# Patient Record
Sex: FEMALE | Race: WHITE | NOT HISPANIC OR LATINO | Employment: UNEMPLOYED | URBAN - NONMETROPOLITAN AREA
[De-identification: names, ages, dates, MRNs, and addresses within clinical notes are randomized per-mention and may not be internally consistent; named-entity substitution may affect disease eponyms.]

---

## 2017-03-07 ENCOUNTER — HOSPITAL ENCOUNTER (OUTPATIENT)
Dept: GENERAL RADIOLOGY | Facility: HOSPITAL | Age: 72
Discharge: HOME | End: 2017-03-07
Attending: FAMILY MEDICINE | Admitting: FAMILY MEDICINE

## 2017-03-07 ENCOUNTER — TRANSCRIBE ORDERS (OUTPATIENT)
Dept: ADMINISTRATIVE | Facility: HOSPITAL | Age: 72
End: 2017-03-07

## 2017-03-07 DIAGNOSIS — M51.26 DISPLACEMENT OF LUMBAR INTERVERTEBRAL DISC WITHOUT MYELOPATHY: Primary | ICD-10-CM

## 2017-03-07 DIAGNOSIS — M51.26 DISPLACEMENT OF LUMBAR INTERVERTEBRAL DISC WITHOUT MYELOPATHY: ICD-10-CM

## 2017-03-07 PROCEDURE — 72110 X-RAY EXAM L-2 SPINE 4/>VWS: CPT | Performed by: RADIOLOGY

## 2017-03-07 PROCEDURE — 72110 X-RAY EXAM L-2 SPINE 4/>VWS: CPT

## 2017-08-14 ENCOUNTER — TRANSCRIBE ORDERS (OUTPATIENT)
Dept: ADMINISTRATIVE | Facility: HOSPITAL | Age: 72
End: 2017-08-14

## 2017-08-14 DIAGNOSIS — Z12.31 VISIT FOR SCREENING MAMMOGRAM: Primary | ICD-10-CM

## 2017-08-15 ENCOUNTER — HOSPITAL ENCOUNTER (OUTPATIENT)
Dept: MAMMOGRAPHY | Facility: HOSPITAL | Age: 72
Discharge: HOME | End: 2017-08-15
Admitting: NURSE PRACTITIONER

## 2017-08-15 DIAGNOSIS — Z12.31 VISIT FOR SCREENING MAMMOGRAM: ICD-10-CM

## 2017-08-15 PROCEDURE — G0202 SCR MAMMO BI INCL CAD: HCPCS

## 2017-08-15 PROCEDURE — 77063 BREAST TOMOSYNTHESIS BI: CPT | Performed by: RADIOLOGY

## 2017-08-15 PROCEDURE — 77063 BREAST TOMOSYNTHESIS BI: CPT

## 2017-08-15 PROCEDURE — G0202 SCR MAMMO BI INCL CAD: HCPCS | Performed by: RADIOLOGY

## 2018-07-18 ENCOUNTER — HOSPITAL ENCOUNTER (OUTPATIENT)
Dept: BONE DENSITY | Facility: HOSPITAL | Age: 73
Discharge: HOME | End: 2018-07-18
Admitting: NURSE PRACTITIONER

## 2018-07-18 DIAGNOSIS — Z78.0 POST-MENOPAUSAL: ICD-10-CM

## 2018-07-18 PROCEDURE — 77080 DXA BONE DENSITY AXIAL: CPT | Performed by: RADIOLOGY

## 2018-07-18 PROCEDURE — 77080 DXA BONE DENSITY AXIAL: CPT

## 2018-12-19 ENCOUNTER — HOSPITAL ENCOUNTER (OUTPATIENT)
Dept: MAMMOGRAPHY | Facility: HOSPITAL | Age: 73
Discharge: HOME | End: 2018-12-19
Admitting: NURSE PRACTITIONER

## 2018-12-19 DIAGNOSIS — Z12.39 SCREENING BREAST EXAMINATION: ICD-10-CM

## 2018-12-19 PROCEDURE — 77067 SCR MAMMO BI INCL CAD: CPT | Performed by: RADIOLOGY

## 2018-12-19 PROCEDURE — 77063 BREAST TOMOSYNTHESIS BI: CPT

## 2018-12-19 PROCEDURE — 77063 BREAST TOMOSYNTHESIS BI: CPT | Performed by: RADIOLOGY

## 2018-12-19 PROCEDURE — 77067 SCR MAMMO BI INCL CAD: CPT

## 2020-01-17 ENCOUNTER — HOSPITAL ENCOUNTER (OUTPATIENT)
Dept: MAMMOGRAPHY | Facility: HOSPITAL | Age: 75
Discharge: HOME | End: 2020-01-17
Admitting: NURSE PRACTITIONER

## 2020-01-17 DIAGNOSIS — Z12.31 VISIT FOR SCREENING MAMMOGRAM: ICD-10-CM

## 2020-01-17 PROCEDURE — 77067 SCR MAMMO BI INCL CAD: CPT

## 2020-01-17 PROCEDURE — 77063 BREAST TOMOSYNTHESIS BI: CPT

## 2020-01-17 PROCEDURE — 77067 SCR MAMMO BI INCL CAD: CPT | Performed by: RADIOLOGY

## 2020-01-17 PROCEDURE — 77063 BREAST TOMOSYNTHESIS BI: CPT | Performed by: RADIOLOGY

## 2020-12-15 ENCOUNTER — TRANSCRIBE ORDERS (OUTPATIENT)
Dept: ADMINISTRATIVE | Facility: HOSPITAL | Age: 75
End: 2020-12-15

## 2020-12-15 DIAGNOSIS — Z01.818 OTHER SPECIFIED PRE-OPERATIVE EXAMINATION: Primary | ICD-10-CM

## 2020-12-18 ENCOUNTER — LAB (OUTPATIENT)
Dept: LAB | Facility: HOSPITAL | Age: 75
End: 2020-12-18

## 2020-12-18 DIAGNOSIS — Z01.818 OTHER SPECIFIED PRE-OPERATIVE EXAMINATION: ICD-10-CM

## 2020-12-18 LAB — SARS-COV-2 RNA RESP QL NAA+PROBE: NOT DETECTED

## 2020-12-18 PROCEDURE — U0004 COV-19 TEST NON-CDC HGH THRU: HCPCS | Performed by: OPHTHALMOLOGY

## 2020-12-18 PROCEDURE — C9803 HOPD COVID-19 SPEC COLLECT: HCPCS

## 2021-01-13 ENCOUNTER — TRANSCRIBE ORDERS (OUTPATIENT)
Dept: ADMINISTRATIVE | Facility: HOSPITAL | Age: 76
End: 2021-01-13

## 2021-01-13 DIAGNOSIS — Z01.818 PRE-OPERATIVE CLEARANCE: Primary | ICD-10-CM

## 2021-01-15 ENCOUNTER — LAB (OUTPATIENT)
Dept: LAB | Facility: HOSPITAL | Age: 76
End: 2021-01-15

## 2021-01-15 DIAGNOSIS — Z01.818 PRE-OPERATIVE CLEARANCE: ICD-10-CM

## 2021-01-15 LAB — SARS-COV-2 RNA RESP QL NAA+PROBE: NOT DETECTED

## 2021-01-15 PROCEDURE — C9803 HOPD COVID-19 SPEC COLLECT: HCPCS

## 2021-01-15 PROCEDURE — U0004 COV-19 TEST NON-CDC HGH THRU: HCPCS | Performed by: OPHTHALMOLOGY

## 2021-02-12 ENCOUNTER — HOSPITAL ENCOUNTER (EMERGENCY)
Facility: HOSPITAL | Age: 76
Discharge: HOME | End: 2021-02-12
Attending: EMERGENCY MEDICINE | Admitting: EMERGENCY MEDICINE

## 2021-02-12 ENCOUNTER — APPOINTMENT (OUTPATIENT)
Dept: GENERAL RADIOLOGY | Facility: HOSPITAL | Age: 76
End: 2021-02-12

## 2021-02-12 VITALS
RESPIRATION RATE: 20 BRPM | HEART RATE: 50 BPM | BODY MASS INDEX: 33.49 KG/M2 | DIASTOLIC BLOOD PRESSURE: 63 MMHG | OXYGEN SATURATION: 97 % | WEIGHT: 182 LBS | HEIGHT: 62 IN | SYSTOLIC BLOOD PRESSURE: 112 MMHG | TEMPERATURE: 98.3 F

## 2021-02-12 DIAGNOSIS — I10 HYPERTENSION, UNSPECIFIED TYPE: Primary | ICD-10-CM

## 2021-02-12 LAB
ALBUMIN SERPL-MCNC: 4.31 G/DL (ref 3.5–5.2)
ALBUMIN/GLOB SERPL: 1.4 G/DL
ALP SERPL-CCNC: 95 U/L (ref 39–117)
ALT SERPL W P-5'-P-CCNC: 18 U/L (ref 1–33)
ANION GAP SERPL CALCULATED.3IONS-SCNC: 14.1 MMOL/L (ref 5–15)
AST SERPL-CCNC: 25 U/L (ref 1–32)
BASOPHILS # BLD AUTO: 0.1 10*3/MM3 (ref 0–0.2)
BASOPHILS NFR BLD AUTO: 1 % (ref 0–1.5)
BILIRUB SERPL-MCNC: 0.3 MG/DL (ref 0–1.2)
BILIRUB UR QL STRIP: NEGATIVE
BUN SERPL-MCNC: 16 MG/DL (ref 8–23)
BUN/CREAT SERPL: 18.8 (ref 7–25)
CALCIUM SPEC-SCNC: 9.5 MG/DL (ref 8.6–10.5)
CHLORIDE SERPL-SCNC: 106 MMOL/L (ref 98–107)
CLARITY UR: CLEAR
CO2 SERPL-SCNC: 18.9 MMOL/L (ref 22–29)
COLOR UR: YELLOW
CREAT SERPL-MCNC: 0.85 MG/DL (ref 0.57–1)
DEPRECATED RDW RBC AUTO: 47.6 FL (ref 37–54)
EOSINOPHIL # BLD AUTO: 0.11 10*3/MM3 (ref 0–0.4)
EOSINOPHIL NFR BLD AUTO: 1.1 % (ref 0.3–6.2)
ERYTHROCYTE [DISTWIDTH] IN BLOOD BY AUTOMATED COUNT: 13.5 % (ref 12.3–15.4)
GFR SERPL CREATININE-BSD FRML MDRD: 66 ML/MIN/1.73
GLOBULIN UR ELPH-MCNC: 3.1 GM/DL
GLUCOSE SERPL-MCNC: 102 MG/DL (ref 65–99)
GLUCOSE UR STRIP-MCNC: NEGATIVE MG/DL
HCT VFR BLD AUTO: 50.3 % (ref 34–46.6)
HGB BLD-MCNC: 15.4 G/DL (ref 12–15.9)
HGB UR QL STRIP.AUTO: NEGATIVE
IMM GRANULOCYTES # BLD AUTO: 0.02 10*3/MM3 (ref 0–0.05)
IMM GRANULOCYTES NFR BLD AUTO: 0.2 % (ref 0–0.5)
KETONES UR QL STRIP: NEGATIVE
LEUKOCYTE ESTERASE UR QL STRIP.AUTO: NEGATIVE
LYMPHOCYTES # BLD AUTO: 3.48 10*3/MM3 (ref 0.7–3.1)
LYMPHOCYTES NFR BLD AUTO: 35.2 % (ref 19.6–45.3)
MCH RBC QN AUTO: 29.1 PG (ref 26.6–33)
MCHC RBC AUTO-ENTMCNC: 30.6 G/DL (ref 31.5–35.7)
MCV RBC AUTO: 95.1 FL (ref 79–97)
MONOCYTES # BLD AUTO: 0.49 10*3/MM3 (ref 0.1–0.9)
MONOCYTES NFR BLD AUTO: 5 % (ref 5–12)
NEUTROPHILS NFR BLD AUTO: 5.68 10*3/MM3 (ref 1.7–7)
NEUTROPHILS NFR BLD AUTO: 57.5 % (ref 42.7–76)
NITRITE UR QL STRIP: NEGATIVE
NRBC BLD AUTO-RTO: 0 /100 WBC (ref 0–0.2)
PH UR STRIP.AUTO: 5.5 [PH] (ref 5–8)
PLATELET # BLD AUTO: 321 10*3/MM3 (ref 140–450)
PMV BLD AUTO: 9.8 FL (ref 6–12)
POTASSIUM SERPL-SCNC: 4.3 MMOL/L (ref 3.5–5.2)
PROT SERPL-MCNC: 7.4 G/DL (ref 6–8.5)
PROT UR QL STRIP: NEGATIVE
RBC # BLD AUTO: 5.29 10*6/MM3 (ref 3.77–5.28)
SODIUM SERPL-SCNC: 139 MMOL/L (ref 136–145)
SP GR UR STRIP: 1.01 (ref 1–1.03)
TROPONIN T SERPL-MCNC: <0.01 NG/ML (ref 0–0.03)
TSH SERPL DL<=0.05 MIU/L-ACNC: 1.41 UIU/ML (ref 0.27–4.2)
UROBILINOGEN UR QL STRIP: NORMAL
WBC # BLD AUTO: 9.88 10*3/MM3 (ref 3.4–10.8)

## 2021-02-12 PROCEDURE — 71045 X-RAY EXAM CHEST 1 VIEW: CPT

## 2021-02-12 PROCEDURE — 80053 COMPREHEN METABOLIC PANEL: CPT | Performed by: NURSE PRACTITIONER

## 2021-02-12 PROCEDURE — 85025 COMPLETE CBC W/AUTO DIFF WBC: CPT | Performed by: NURSE PRACTITIONER

## 2021-02-12 PROCEDURE — 99284 EMERGENCY DEPT VISIT MOD MDM: CPT

## 2021-02-12 PROCEDURE — 84484 ASSAY OF TROPONIN QUANT: CPT | Performed by: NURSE PRACTITIONER

## 2021-02-12 PROCEDURE — 71045 X-RAY EXAM CHEST 1 VIEW: CPT | Performed by: RADIOLOGY

## 2021-02-12 PROCEDURE — 93005 ELECTROCARDIOGRAM TRACING: CPT | Performed by: NURSE PRACTITIONER

## 2021-02-12 PROCEDURE — 81003 URINALYSIS AUTO W/O SCOPE: CPT | Performed by: NURSE PRACTITIONER

## 2021-02-12 PROCEDURE — 84443 ASSAY THYROID STIM HORMONE: CPT | Performed by: NURSE PRACTITIONER

## 2021-02-12 PROCEDURE — 93010 ELECTROCARDIOGRAM REPORT: CPT | Performed by: INTERNAL MEDICINE

## 2021-02-12 RX ORDER — VALSARTAN 80 MG/1
80 TABLET ORAL DAILY
COMMUNITY
End: 2021-08-12

## 2021-02-12 RX ORDER — LOVASTATIN 20 MG/1
20 TABLET ORAL NIGHTLY
COMMUNITY
End: 2021-08-12

## 2021-02-12 RX ORDER — ATENOLOL 25 MG/1
25 TABLET ORAL DAILY
COMMUNITY

## 2021-02-12 RX ORDER — CLONIDINE HYDROCHLORIDE 0.1 MG/1
0.2 TABLET ORAL ONCE
Status: COMPLETED | OUTPATIENT
Start: 2021-02-12 | End: 2021-02-12

## 2021-02-12 RX ORDER — CLONIDINE HYDROCHLORIDE 0.1 MG/1
0.1 TABLET ORAL 2 TIMES DAILY PRN
Qty: 15 TABLET | Refills: 0 | Status: SHIPPED | OUTPATIENT
Start: 2021-02-12 | End: 2021-08-12

## 2021-02-12 RX ORDER — TIZANIDINE 4 MG/1
4 TABLET ORAL NIGHTLY PRN
COMMUNITY
End: 2021-08-12

## 2021-02-12 RX ORDER — LEVOTHYROXINE SODIUM 0.1 MG/1
75 TABLET ORAL DAILY
COMMUNITY

## 2021-02-12 RX ORDER — ESTRADIOL 1 MG/1
1 TABLET ORAL DAILY
COMMUNITY

## 2021-02-12 RX ORDER — CETIRIZINE HYDROCHLORIDE 10 MG/1
10 TABLET ORAL DAILY
COMMUNITY

## 2021-02-12 RX ORDER — ASPIRIN 81 MG/1
81 TABLET, CHEWABLE ORAL DAILY
COMMUNITY

## 2021-02-12 RX ADMIN — CLONIDINE HYDROCHLORIDE 0.2 MG: 0.1 TABLET ORAL at 13:40

## 2021-02-12 NOTE — ED PROVIDER NOTES
Subjective     Hypertension  Severity:  Moderate  Onset quality:  Gradual  Duration:  1 day  Timing:  Intermittent  Progression:  Unchanged  Chronicity:  New  Context: normal sodium, not caffeine, not herbal remedies and not stress    Relieved by:  Nothing  Worsened by:  Nothing  Ineffective treatments: RX meds.  Associated symptoms: headaches    Associated symptoms: no abdominal pain, no confusion and no ear pain    Risk factors comment:  HTN      Review of Systems   Constitutional: Negative.    HENT: Negative for ear pain.    Eyes: Negative.    Respiratory: Negative.    Cardiovascular: Negative.    Gastrointestinal: Negative.  Negative for abdominal pain.   Endocrine: Negative.    Genitourinary: Negative.    Musculoskeletal: Negative.    Skin: Negative.    Allergic/Immunologic: Negative.    Neurological: Positive for headaches.   Hematological: Negative.    Psychiatric/Behavioral: Negative.  Negative for confusion.       Past Medical History:   Diagnosis Date   • Disease of thyroid gland    • Hyperlipidemia    • Hypertension    • Migraine        Allergies   Allergen Reactions   • Ampicillin Diarrhea       Past Surgical History:   Procedure Laterality Date   • HYSTERECTOMY  age 38       Family History   Problem Relation Age of Onset   • Breast cancer Neg Hx        Social History     Socioeconomic History   • Marital status:      Spouse name: Not on file   • Number of children: Not on file   • Years of education: Not on file   • Highest education level: Not on file           Objective   Physical Exam  Vitals signs and nursing note reviewed.   Constitutional:       Appearance: She is well-developed.   HENT:      Head: Normocephalic.      Right Ear: External ear normal.      Left Ear: External ear normal.   Eyes:      Conjunctiva/sclera: Conjunctivae normal.      Pupils: Pupils are equal, round, and reactive to light.   Neck:      Musculoskeletal: Normal range of motion and neck supple.   Cardiovascular:       Rate and Rhythm: Normal rate and regular rhythm.      Heart sounds: Normal heart sounds.   Pulmonary:      Effort: Pulmonary effort is normal.      Breath sounds: Normal breath sounds.   Abdominal:      General: Bowel sounds are normal.      Palpations: Abdomen is soft.   Musculoskeletal: Normal range of motion.   Skin:     General: Skin is warm and dry.      Capillary Refill: Capillary refill takes less than 2 seconds.   Neurological:      Mental Status: She is alert and oriented to person, place, and time.   Psychiatric:         Behavior: Behavior normal.         Thought Content: Thought content normal.         Procedures           ED Course                                           MDM    Final diagnoses:   Hypertension, unspecified type            Tano Viera, APRN  02/16/21 [id number]

## 2021-02-12 NOTE — ED NOTES
Called outpatient lab to come stick pt, dasha said he will come over in a couple min.      Sonny Perdomo  02/12/21 4003

## 2021-02-15 LAB
QT INTERVAL: 456 MS
QTC INTERVAL: 440 MS

## 2021-03-22 ENCOUNTER — TRANSCRIBE ORDERS (OUTPATIENT)
Dept: ADMINISTRATIVE | Facility: HOSPITAL | Age: 76
End: 2021-03-22

## 2021-03-22 ENCOUNTER — HOSPITAL ENCOUNTER (OUTPATIENT)
Dept: GENERAL RADIOLOGY | Facility: HOSPITAL | Age: 76
Discharge: HOME | End: 2021-03-22
Admitting: NURSE PRACTITIONER

## 2021-03-22 DIAGNOSIS — M79.671 RIGHT FOOT PAIN: ICD-10-CM

## 2021-03-22 DIAGNOSIS — M79.671 RIGHT FOOT PAIN: Primary | ICD-10-CM

## 2021-03-22 PROCEDURE — 73630 X-RAY EXAM OF FOOT: CPT

## 2021-03-22 PROCEDURE — 73630 X-RAY EXAM OF FOOT: CPT | Performed by: RADIOLOGY

## 2021-04-02 ENCOUNTER — TRANSCRIBE ORDERS (OUTPATIENT)
Dept: ADMINISTRATIVE | Facility: HOSPITAL | Age: 76
End: 2021-04-02

## 2021-04-02 DIAGNOSIS — H53.452 NASAL STEP VISUAL FIELD DEFECT OF LEFT EYE: Primary | ICD-10-CM

## 2021-04-21 ENCOUNTER — HOSPITAL ENCOUNTER (OUTPATIENT)
Dept: MRI IMAGING | Facility: HOSPITAL | Age: 76
Discharge: HOME | End: 2021-04-21
Admitting: OPHTHALMOLOGY

## 2021-04-21 DIAGNOSIS — H53.452 NASAL STEP VISUAL FIELD DEFECT OF LEFT EYE: ICD-10-CM

## 2021-04-21 PROCEDURE — 70551 MRI BRAIN STEM W/O DYE: CPT | Performed by: RADIOLOGY

## 2021-04-21 PROCEDURE — 70551 MRI BRAIN STEM W/O DYE: CPT

## 2021-08-12 ENCOUNTER — OFFICE VISIT (OUTPATIENT)
Dept: NEUROLOGY | Facility: CLINIC | Age: 76
End: 2021-08-12

## 2021-08-12 VITALS
SYSTOLIC BLOOD PRESSURE: 164 MMHG | OXYGEN SATURATION: 95 % | WEIGHT: 187 LBS | BODY MASS INDEX: 34.41 KG/M2 | HEIGHT: 62 IN | DIASTOLIC BLOOD PRESSURE: 90 MMHG | HEART RATE: 67 BPM

## 2021-08-12 DIAGNOSIS — I63.9 OCCIPITAL STROKE (HCC): Primary | ICD-10-CM

## 2021-08-12 DIAGNOSIS — M19.90 ARTHRITIS: ICD-10-CM

## 2021-08-12 DIAGNOSIS — G44.219 EPISODIC TENSION-TYPE HEADACHE, NOT INTRACTABLE: ICD-10-CM

## 2021-08-12 PROCEDURE — 99204 OFFICE O/P NEW MOD 45 MIN: CPT | Performed by: PSYCHIATRY & NEUROLOGY

## 2021-08-12 RX ORDER — LANOLIN ALCOHOL/MO/W.PET/CERES
1000 CREAM (GRAM) TOPICAL DAILY
COMMUNITY

## 2021-08-12 RX ORDER — GALCANEZUMAB 120 MG/ML
INJECTION, SOLUTION SUBCUTANEOUS
COMMUNITY
Start: 2021-08-10

## 2021-08-12 RX ORDER — AMLODIPINE AND OLMESARTAN MEDOXOMIL 5; 40 MG/1; MG/1
TABLET ORAL
COMMUNITY
Start: 2021-07-03

## 2021-08-12 RX ORDER — IBUPROFEN 800 MG/1
TABLET ORAL
COMMUNITY
Start: 2021-07-26

## 2021-08-12 RX ORDER — OMEPRAZOLE 40 MG/1
CAPSULE, DELAYED RELEASE ORAL
COMMUNITY
Start: 2021-06-10

## 2021-08-12 RX ORDER — CLONIDINE HYDROCHLORIDE 0.2 MG/1
TABLET ORAL
COMMUNITY
Start: 2021-06-29

## 2021-08-12 RX ORDER — LOVASTATIN 40 MG/1
40 TABLET ORAL NIGHTLY
Qty: 30 TABLET | Refills: 6 | Status: SHIPPED | OUTPATIENT
Start: 2021-08-12 | End: 2021-12-23

## 2021-08-12 NOTE — PROGRESS NOTES
Subjective:    CC: Gregoria Pendleton is seen today in consultation at the request of SHAD Hernandez for Migraine       HPI:  76-year-old female with a past medical history of hypertension, hyperlipidemia, hypothyroidism, arthritis presents with headaches and for follow-up of stroke.  As per patient she started having headaches in February of this year along with blurred vision and seeing bright colors in her field of vision.  Prior to that in December and January she had had cataract surgeries.  Then about a month later in March she also started to notice loss of vision worse in the left eye.  In addition she was having visual hallucinations at the time where she saw people or snakes in her field of vision.  This lasted for a few weeks.  She was started on once a month Emgality shots which helped with the headaches.  She currently has a headache about once a week on the top of her head on both sides with nausea and photophobia.  She typically takes Tylenol for the headaches.  Also takes ibuprofen twice a day for arthritis.  She had a MRI brain in April that showed a chronic infarct in the right occipital region along with extensive chronic ischemic changes no acute abnormalities.  Patient has been on aspirin 81 mg and lovastatin 20 mg for several years.  Her most recent lipid panel was as follows-, , , HDL 66.  A1c was 5.9.  Of note-I personally reviewed her MRI brain    The following portions of the patient's history were reviewed today and updated as of 08/12/2021  : allergies, current medications, past family history, past medical history, past social history, past surgical history and problem list  These document will be scanned to patient's chart.      Current Outpatient Medications:   •  amlodipine-olmesartan (ARVIN) 5-40 MG per tablet, , Disp: , Rfl:   •  aspirin 81 MG chewable tablet, Chew 81 mg Daily., Disp: , Rfl:   •  atenolol (TENORMIN) 25 MG tablet, Take 25 mg by mouth Daily.,  "Disp: , Rfl:   •  cetirizine (zyrTEC) 10 MG tablet, Take 10 mg by mouth Daily., Disp: , Rfl:   •  cloNIDine (CATAPRES) 0.2 MG tablet, , Disp: , Rfl:   •  Emgality 120 MG/ML auto-injector pen, , Disp: , Rfl:   •  estradiol (ESTRACE) 1 MG tablet, Take 1 mg by mouth Daily., Disp: , Rfl:   •  ibuprofen (ADVIL,MOTRIN) 800 MG tablet, , Disp: , Rfl:   •  levothyroxine (SYNTHROID, LEVOTHROID) 100 MCG tablet, Take 75 mcg by mouth Daily., Disp: , Rfl:   •  omeprazole (priLOSEC) 40 MG capsule, , Disp: , Rfl:   •  vitamin B-12 (CYANOCOBALAMIN) 1000 MCG tablet, Take 1,000 mcg by mouth Daily., Disp: , Rfl:   •  vitamin D3 125 MCG (5000 UT) capsule capsule, Take 5,000 Units by mouth Daily., Disp: , Rfl:   •  lovastatin (MEVACOR) 40 MG tablet, Take 1 tablet by mouth Every Night., Disp: 30 tablet, Rfl: 6  •  ubrogepant (ubrogepant) 50 MG tablet, Take 1 tablet by mouth As Needed (At the onset of her headache.  May repeat once in 2 hours.  Do not take over 2 tabs a day or 8 tabs a month)., Disp: 8 tablet, Rfl: 3   Past Medical History:   Diagnosis Date   • Disease of thyroid gland    • Hyperlipidemia    • Hypertension    • Migraine       Past Surgical History:   Procedure Laterality Date   • HYSTERECTOMY  age 38      Family History   Problem Relation Age of Onset   • Breast cancer Neg Hx       Social History     Socioeconomic History   • Marital status:      Spouse name: Not on file   • Number of children: Not on file   • Years of education: Not on file   • Highest education level: Not on file   Tobacco Use   • Smoking status: Never Smoker   • Smokeless tobacco: Never Used   Substance and Sexual Activity   • Alcohol use: Never   • Drug use: Never     Review of Systems   Eyes: Positive for visual disturbance.   Neurological: Positive for headache.   All other systems reviewed and are negative.      Objective:    /90   Pulse 67   Ht 157.5 cm (62\")   Wt 84.8 kg (187 lb)   SpO2 95%   BMI 34.20 kg/m²     Neurology " Exam:    General apperance: NAD.     Mental status: Alert, awake and oriented to time place and person.    Recent and Remote memory: Intact.    Attention span and Concentration: Normal.     Language and Speech: Intact- No dysarthria.    Fluency, Naming , Repitition and Comprehension:  Intact    Cranial Nerves:   CN II: Visual fields reveal left homonymous inferior quadrantanopsia. Intact. Fundi - Normal, No papillederma, Pupils - CHELSI  CN III, IV and VI: Extraocular movements are intact. Normal saccades.   CN V: Facial sensation is intact.   CN VII: Muscles of facial expression reveal no asymmetry. Intact.   CN VIII: Hearing is intact. Whispered voice intact.   CN IX and X: Palate elevates symmetrically. Intact  CN XI: Shoulder shrug is intact.   CN XII: Tongue is midline without evidence of atrophy or fasciculation.     Ophthalmoscopic exam of optic disc-normal    Motor:  Right UE muscle strength 5/5. Normal tone.     Left UE muscle strength 5/5. Normal tone.      Right LE muscle strength5/5. Normal tone.     Left LE muscle strength 5/5. Normal tone.      Sensory: Normal light touch, vibration and pinprick sensation bilaterally.    DTRs: 2+ bilaterally in upper and lower extremities.    Babinski: Negative bilaterally.    Co-ordination: Normal finger-to-nose, heel to shin B/L.    Rhomberg: Negative.    Gait: Normal.    Cardiovascular: Regular rate and rhythm without murmur, gallop or rub.    Assessment and Plan:  1. Occipital stroke (CMS/HCC)  Right occipital infarct  I will check a Holter monitor to rule out atrial fibrillation  I have told her to increase her aspirin to 325 mg daily  She should also increase her lovastatin to 40 mg daily for goal LDL of less than 100.  Her LDL was 107  She is a prediabetic, A1c of 5.9 and I have told her to make dietary modifications  Goal blood pressure less than 130/90.  Her blood pressure is elevated today therefore I have told her to monitor her blood pressure regularly at  home and increase her dose of clonidine if need be.  She was supposed to be taking it 3 times a day but only takes it twice a day.  I also explained to her that her visual hallucinations could be due to her vision loss.  Fortunately they have subsided    - Holter Monitor - 72 Hour Up To 15 Days; Future    2. Episodic tension-type headache, not intractable  Patient's headaches were most likely a result of her occipital stroke  She is currently on  Emgality 120 mg sc q. Monthly  I will start her on Ubrelvy 50 mg for abortive treatment of her headaches  I have told her to avoid over-the-counter medications    3. Arthritis  Patient is taking daily ibuprofen twice a day.  I have told her to reduce her intake of ibuprofen and speak to her PCP about starting her on another disease modifying agent  She can also start chondroitin/glucosamine supplements       Return in about 3 months (around 11/12/2021).     I spent over 45 minutes with the patient face to face out of which over 50% (30 minutes) was spent in management, instructions and education.     Glo Mcmahan MD

## 2021-12-23 RX ORDER — LOVASTATIN 40 MG/1
TABLET ORAL
Qty: 90 TABLET | Refills: 3 | Status: SHIPPED | OUTPATIENT
Start: 2021-12-23

## 2022-06-06 ENCOUNTER — TRANSCRIBE ORDERS (OUTPATIENT)
Dept: ADMINISTRATIVE | Facility: HOSPITAL | Age: 77
End: 2022-06-06

## 2022-06-06 DIAGNOSIS — N28.9 ABNORMAL RENAL FUNCTION: Primary | ICD-10-CM

## 2022-08-04 ENCOUNTER — HOSPITAL ENCOUNTER (OUTPATIENT)
Dept: ULTRASOUND IMAGING | Facility: HOSPITAL | Age: 77
Discharge: HOME | End: 2022-08-04
Admitting: NURSE PRACTITIONER

## 2022-08-04 DIAGNOSIS — N28.9 ABNORMAL RENAL FUNCTION: ICD-10-CM

## 2022-08-04 PROCEDURE — 76775 US EXAM ABDO BACK WALL LIM: CPT | Performed by: RADIOLOGY

## 2022-08-04 PROCEDURE — 76775 US EXAM ABDO BACK WALL LIM: CPT

## 2023-08-07 ENCOUNTER — TRANSCRIBE ORDERS (OUTPATIENT)
Dept: ADMINISTRATIVE | Facility: HOSPITAL | Age: 78
End: 2023-08-07
Payer: MEDICARE

## 2023-08-07 ENCOUNTER — HOSPITAL ENCOUNTER (OUTPATIENT)
Dept: GENERAL RADIOLOGY | Facility: HOSPITAL | Age: 78
Discharge: HOME | End: 2023-08-07
Admitting: NURSE PRACTITIONER
Payer: MEDICARE

## 2023-08-07 DIAGNOSIS — R06.02 SHORTNESS OF BREATH: ICD-10-CM

## 2023-08-07 DIAGNOSIS — M79.671 RIGHT FOOT PAIN: ICD-10-CM

## 2023-08-07 DIAGNOSIS — R06.02 SHORTNESS OF BREATH: Primary | ICD-10-CM

## 2023-08-07 PROCEDURE — 73630 X-RAY EXAM OF FOOT: CPT | Performed by: RADIOLOGY

## 2023-08-07 PROCEDURE — 71046 X-RAY EXAM CHEST 2 VIEWS: CPT

## 2023-08-07 PROCEDURE — 71046 X-RAY EXAM CHEST 2 VIEWS: CPT | Performed by: RADIOLOGY

## 2023-08-07 PROCEDURE — 73630 X-RAY EXAM OF FOOT: CPT

## 2023-08-29 ENCOUNTER — OFFICE VISIT (OUTPATIENT)
Dept: CARDIOLOGY | Facility: CLINIC | Age: 78
End: 2023-08-29
Payer: MEDICARE

## 2023-08-29 VITALS
SYSTOLIC BLOOD PRESSURE: 154 MMHG | DIASTOLIC BLOOD PRESSURE: 76 MMHG | BODY MASS INDEX: 31.83 KG/M2 | WEIGHT: 173 LBS | HEIGHT: 62 IN | OXYGEN SATURATION: 95 % | HEART RATE: 68 BPM | RESPIRATION RATE: 16 BRPM

## 2023-08-29 DIAGNOSIS — J84.9 INTERSTITIAL LUNG DISEASE: ICD-10-CM

## 2023-08-29 DIAGNOSIS — I10 ESSENTIAL HYPERTENSION: ICD-10-CM

## 2023-08-29 DIAGNOSIS — R06.09 DYSPNEA ON EXERTION: Primary | ICD-10-CM

## 2023-08-29 PROCEDURE — 99204 OFFICE O/P NEW MOD 45 MIN: CPT | Performed by: INTERNAL MEDICINE

## 2023-08-29 PROCEDURE — 1159F MED LIST DOCD IN RCRD: CPT | Performed by: INTERNAL MEDICINE

## 2023-08-29 PROCEDURE — 1160F RVW MEDS BY RX/DR IN RCRD: CPT | Performed by: INTERNAL MEDICINE

## 2023-08-29 PROCEDURE — 93000 ELECTROCARDIOGRAM COMPLETE: CPT | Performed by: INTERNAL MEDICINE

## 2023-08-29 RX ORDER — DULOXETIN HYDROCHLORIDE 60 MG/1
60 CAPSULE, DELAYED RELEASE ORAL DAILY
COMMUNITY

## 2023-08-29 RX ORDER — DULAGLUTIDE 3 MG/.5ML
3 INJECTION, SOLUTION SUBCUTANEOUS
COMMUNITY

## 2023-08-29 RX ORDER — BUMETANIDE 1 MG/1
1 TABLET ORAL DAILY
Qty: 30 TABLET | Refills: 2 | Status: SHIPPED | OUTPATIENT
Start: 2023-08-29

## 2023-08-29 RX ORDER — TIZANIDINE 4 MG/1
4 TABLET ORAL NIGHTLY PRN
COMMUNITY

## 2023-08-29 RX ORDER — SPIRONOLACTONE 50 MG/1
50 TABLET, FILM COATED ORAL 2 TIMES DAILY
COMMUNITY

## 2023-08-29 NOTE — PROGRESS NOTES
Marisa Arias APRN  Gregoria Pendleton  1945 08/29/2023    There is no problem list on file for this patient.      Dear Marisa Arias APRN:    Subjective     Gregoria Pendleton is a 78 y.o. female with the problems as listed above, presents    Chief complaint: Progressive shortness of breath since 2019    History of Present Illness: Ms. Lino is a pleasant 78-year-old  female with no history of known coronary artery disease or structural heart disease, has history of hypertension, type 2 diabetes mellitus, presents with complaints of having progressive shortness of breath since 2019.  She believes that she probably had COVID even before it was recognized as epidemic back then.  She never had a positive COVID test since then.  She had flulike symptoms but she apparently did not get any evaluation at that time.  She denies any complaints of chest pains other than some gas pains that seem to be relieved with liquid antacid.  She denies any PND, orthopnea or pedal edema.  She has remote history of SVT back in 1980s with apparently no recurrence.  She is a non-smoker.  She denies any exposure to chemicals or dust.  Her chest x-ray on 8/7/2023 revealed diffuse interstitial lung disease which apparently is new since chest x-ray in 2021.    XR Chest PA & Lateral [[id number]]   Order  Status: Final result     Study Notes     Adelina Steve on 8/7/2023 12:37 PM EDT   Cough/pain rt foot     Study Result    Narrative & Impression   EXAMINATION: XR CHEST PA AND LATERAL-      CLINICAL INDICATION: R06.2; R06.02-Shortness of breath        COMPARISON: 02/12/2021      TECHNIQUE: XR CHEST PA AND LATERAL-      FINDINGS:   LUNGS: Diffuse interstitial lung disease      HEART AND MEDIASTINUM: Heart and mediastinal contours are unremarkable     SKELETON: Bony and soft tissue structures are unremarkable.     IMPRESSION:     Diffuse interstitial lung disease.     This report was finalized on 8/7/2023 12:47 PM by   Shawn Cohen MD.         Allergies   Allergen Reactions    Ampicillin Diarrhea   :    Current Outpatient Medications:     amlodipine-olmesartan (ARVIN) 5-40 MG per tablet, , Disp: , Rfl:     aspirin 81 MG chewable tablet, Chew 1 tablet Daily., Disp: , Rfl:     atenolol (TENORMIN) 25 MG tablet, Take 1 tablet by mouth Daily., Disp: , Rfl:     cetirizine (zyrTEC) 10 MG tablet, Take 1 tablet by mouth Daily., Disp: , Rfl:     cloNIDine (CATAPRES) 0.2 MG tablet, 1 tablet 3 (Three) Times a Day., Disp: , Rfl:     Emgality 120 MG/ML auto-injector pen, , Disp: , Rfl:     estradiol (ESTRACE) 1 MG tablet, Take 1 tablet by mouth Daily., Disp: , Rfl:     levothyroxine (SYNTHROID, LEVOTHROID) 100 MCG tablet, Take 75 mcg by mouth Daily., Disp: , Rfl:     lovastatin (MEVACOR) 40 MG tablet, TAKE 1 TABLET EVERY NIGHT, Disp: 90 tablet, Rfl: 3    omeprazole (priLOSEC) 40 MG capsule, , Disp: , Rfl:     ubrogepant (ubrogepant) 50 MG tablet, Take 1 tablet by mouth As Needed (At the onset of her headache.  May repeat once in 2 hours.  Do not take over 2 tabs a day or 8 tabs a month)., Disp: 8 tablet, Rfl: 3    bumetanide (BUMEX) 1 MG tablet, Take 1 tablet by mouth Daily., Disp: 30 tablet, Rfl: 2    Dulaglutide (Trulicity) 3 MG/0.5ML solution pen-injector, Inject 0.5 mL under the skin into the appropriate area as directed., Disp: , Rfl:     DULoxetine (CYMBALTA) 60 MG capsule, Take 1 capsule by mouth Daily., Disp: , Rfl:     linaclotide (LINZESS) 290 MCG capsule capsule, Take 1 capsule by mouth Every Morning Before Breakfast., Disp: , Rfl:     spironolactone (ALDACTONE) 50 MG tablet, Take 1 tablet by mouth 2 (Two) Times a Day., Disp: , Rfl:     tiZANidine (ZANAFLEX) 4 MG tablet, Take 1 tablet by mouth At Night As Needed for Muscle Spasms., Disp: , Rfl:     Past Medical History:   Diagnosis Date    Disease of thyroid gland     Hyperlipidemia     Hypertension     Migraine     Stroke      Past Surgical History:   Procedure Laterality Date     "HYSTERECTOMY  age 38     Family History   Problem Relation Age of Onset    Heart failure Father     Heart disease Sister     Heart attack Sister     Breast cancer Neg Hx      Social History     Tobacco Use    Smoking status: Never    Smokeless tobacco: Never   Substance Use Topics    Alcohol use: Never    Drug use: Never     Review of Systems   Constitutional: Negative for chills, fever, malaise/fatigue, weight gain and weight loss.   HENT:  Negative for congestion and nosebleeds.    Eyes:  Positive for visual disturbance.   Cardiovascular:  Negative for chest pain, irregular heartbeat, leg swelling and orthopnea.   Respiratory:  Positive for shortness of breath. Negative for cough and hemoptysis.    Skin: Negative.    Gastrointestinal:  Negative for abdominal pain, change in bowel habit, hematemesis, melena and vomiting.   Genitourinary:  Negative for dysuria, frequency and hematuria.   Neurological:  Negative for focal weakness, headaches, light-headedness and weakness.   Psychiatric/Behavioral: Negative.     Objective   Blood pressure 154/76, pulse 68, resp. rate 16, height 157.5 cm (62\"), weight 78.5 kg (173 lb), SpO2 95 %.  Body mass index is 31.64 kg/mý.    Vitals reviewed.   Constitutional:       Appearance: Well-developed.   Eyes:      Conjunctiva/sclera: Conjunctivae normal.   HENT:      Head: Normocephalic.   Neck:      Thyroid: No thyromegaly.      Vascular: No JVD.      Trachea: No tracheal deviation.   Pulmonary:      Effort: No respiratory distress.      Breath sounds: Normal breath sounds. No wheezing. No rales.   Cardiovascular:      PMI at left midclavicular line. Normal rate. Regular rhythm. Normal S1. Normal S2.       Murmurs: There is no murmur.      No gallop.  No click. No rub.   Pulses:     Intact distal pulses.   Edema:     Pretibial: bilateral 1+ edema of the pretibial area.     Ankle: bilateral 1+ edema of the ankle.     Feet: bilateral 1+ edema of the feet.  Abdominal:      General: Bowel " sounds are normal.      Palpations: Abdomen is soft. There is no abdominal mass.      Tenderness: There is no abdominal tenderness.   Musculoskeletal:      Cervical back: Normal range of motion and neck supple. Skin:     General: Skin is warm and dry.   Neurological:      Mental Status: Alert and oriented to person, place, and time.      Cranial Nerves: No cranial nerve deficit.       Lab Results   Component Value Date     02/12/2021    K 4.3 02/12/2021     02/12/2021    CO2 18.9 (L) 02/12/2021    BUN 16 02/12/2021    CREATININE 0.85 02/12/2021    GLUCOSE 102 (H) 02/12/2021    CALCIUM 9.5 02/12/2021    AST 25 02/12/2021    ALT 18 02/12/2021    ALKPHOS 95 02/12/2021     No results found for: CKTOTAL  Lab Results   Component Value Date    WBC 9.88 02/12/2021    HGB 15.4 02/12/2021    HCT 50.3 (H) 02/12/2021     02/12/2021       Lab Results   Component Value Date    TSH 1.410 02/12/2021      No results found for: BNP    ECG 12 Lead    Date/Time: 8/29/2023 12:53 PM  Performed by: Damian Novak MD  Authorized by: Damian Novak MD   Comparison: compared with previous ECG from 2/12/2021  Similar to previous ECG  Rhythm: sinus rhythm  BPM: 66  Other findings: low voltage            Assessment & Plan    Diagnosis Plan   1. Dyspnea on exertion (NYHA class III)  CT Chest Without Contrast, BNP    Adult Transthoracic Echo Complete       2. Interstitial lung disease        3. Essential hypertension            Recommendations  Orders Placed This Encounter   Procedures    CT Chest Without Contrast    BNP    ECG 12 Lead    Adult Transthoracic Echo Complete w/ Color, Spectral and Contrast if necessary per protocol      We will evaluate her dyspnea further with a proBNP, CT scan of the chest without contrast and echo Doppler study.  We will add bumetanide 1 mg daily for 5 days and then stop.  Check CMP.  We will refer her to a pulmonologist to address her interstitial lung disease.    Return in about 4 weeks  (around 9/26/2023).    As always, Marisa Arias APRN  I appreciate very much the opportunity to participate in the cardiovascular care of your patients. Please do not hesitate to call me with any questions with regards to Gregoriamatthew Pendleton's evaluation and management.     With Best Regards,        Damian Novak MD, [facility]    Please note that portions of this note were completed with a voice recognition program.

## 2023-08-29 NOTE — LETTER
August 31, 2023     SHAD Hernandez  [address]  [place] [address]    Patient: Gregoria Pendleton   YOB: 1945   Date of Visit: 8/29/2023     Dear SHAD Hernandez:       Thank you for referring Gregoria Pendleton to me for evaluation. Below are the relevant portions of my assessment and plan of care.    If you have questions, please do not hesitate to call me. I look forward to following Gregoria along with you.         Sincerely,        Damian Novak MD        CC: No Recipients    Damian Novak MD  08/31/23 1037  Sign when Signing Visit  Marisa Arias APRN  Gregoria Pendleton  1945 08/29/2023    There is no problem list on file for this patient.      Dear Marisa Arias APRN:    Subjective    Gregoria Pendleton is a 78 y.o. female with the problems as listed above, presents    Chief complaint: Progressive shortness of breath since 2019    History of Present Illness: Ms. Lino is a pleasant 78-year-old  female with no history of known coronary artery disease or structural heart disease, has history of hypertension, type 2 diabetes mellitus, presents with complaints of having progressive shortness of breath since 2019.  She believes that she probably had COVID even before it was recognized as epidemic back then.  She never had a positive COVID test since then.  She had flulike symptoms but she apparently did not get any evaluation at that time.  She denies any complaints of chest pains other than some gas pains that seem to be relieved with liquid antacid.  She denies any PND, orthopnea or pedal edema.  She has remote history of SVT back in 1980s with apparently no recurrence.  She is a non-smoker.  She denies any exposure to chemicals or dust.  Her chest x-ray on 8/7/2023 revealed diffuse interstitial lung disease which apparently is new since chest x-ray in 2021.    XR Chest PA & Lateral [[id number]]   Order  Status: Final result     Study Notes     Power  Adelina BLAIR on 8/7/2023 12:37 PM EDT   Cough/pain rt foot     Study Result    Narrative & Impression   EXAMINATION: XR CHEST PA AND LATERAL-      CLINICAL INDICATION: R06.2; R06.02-Shortness of breath        COMPARISON: 02/12/2021      TECHNIQUE: XR CHEST PA AND LATERAL-      FINDINGS:   LUNGS: Diffuse interstitial lung disease      HEART AND MEDIASTINUM: Heart and mediastinal contours are unremarkable     SKELETON: Bony and soft tissue structures are unremarkable.     IMPRESSION:     Diffuse interstitial lung disease.     This report was finalized on 8/7/2023 12:47 PM by Dr. Shawn Cohen MD.         Allergies   Allergen Reactions    Ampicillin Diarrhea   :    Current Outpatient Medications:     amlodipine-olmesartan (ARVIN) 5-40 MG per tablet, , Disp: , Rfl:     aspirin 81 MG chewable tablet, Chew 1 tablet Daily., Disp: , Rfl:     atenolol (TENORMIN) 25 MG tablet, Take 1 tablet by mouth Daily., Disp: , Rfl:     cetirizine (zyrTEC) 10 MG tablet, Take 1 tablet by mouth Daily., Disp: , Rfl:     cloNIDine (CATAPRES) 0.2 MG tablet, 1 tablet 3 (Three) Times a Day., Disp: , Rfl:     Emgality 120 MG/ML auto-injector pen, , Disp: , Rfl:     estradiol (ESTRACE) 1 MG tablet, Take 1 tablet by mouth Daily., Disp: , Rfl:     levothyroxine (SYNTHROID, LEVOTHROID) 100 MCG tablet, Take 75 mcg by mouth Daily., Disp: , Rfl:     lovastatin (MEVACOR) 40 MG tablet, TAKE 1 TABLET EVERY NIGHT, Disp: 90 tablet, Rfl: 3    omeprazole (priLOSEC) 40 MG capsule, , Disp: , Rfl:     ubrogepant (ubrogepant) 50 MG tablet, Take 1 tablet by mouth As Needed (At the onset of her headache.  May repeat once in 2 hours.  Do not take over 2 tabs a day or 8 tabs a month)., Disp: 8 tablet, Rfl: 3    bumetanide (BUMEX) 1 MG tablet, Take 1 tablet by mouth Daily., Disp: 30 tablet, Rfl: 2    Dulaglutide (Trulicity) 3 MG/0.5ML solution pen-injector, Inject 0.5 mL under the skin into the appropriate area as directed., Disp: , Rfl:     DULoxetine  "(CYMBALTA) 60 MG capsule, Take 1 capsule by mouth Daily., Disp: , Rfl:     linaclotide (LINZESS) 290 MCG capsule capsule, Take 1 capsule by mouth Every Morning Before Breakfast., Disp: , Rfl:     spironolactone (ALDACTONE) 50 MG tablet, Take 1 tablet by mouth 2 (Two) Times a Day., Disp: , Rfl:     tiZANidine (ZANAFLEX) 4 MG tablet, Take 1 tablet by mouth At Night As Needed for Muscle Spasms., Disp: , Rfl:     Past Medical History:   Diagnosis Date    Disease of thyroid gland     Hyperlipidemia     Hypertension     Migraine     Stroke      Past Surgical History:   Procedure Laterality Date    HYSTERECTOMY  age 38     Family History   Problem Relation Age of Onset    Heart failure Father     Heart disease Sister     Heart attack Sister     Breast cancer Neg Hx      Social History     Tobacco Use    Smoking status: Never    Smokeless tobacco: Never   Substance Use Topics    Alcohol use: Never    Drug use: Never     Review of Systems   Constitutional: Negative for chills, fever, malaise/fatigue, weight gain and weight loss.   HENT:  Negative for congestion and nosebleeds.    Eyes:  Positive for visual disturbance.   Cardiovascular:  Negative for chest pain, irregular heartbeat, leg swelling and orthopnea.   Respiratory:  Positive for shortness of breath. Negative for cough and hemoptysis.    Skin: Negative.    Gastrointestinal:  Negative for abdominal pain, change in bowel habit, hematemesis, melena and vomiting.   Genitourinary:  Negative for dysuria, frequency and hematuria.   Neurological:  Negative for focal weakness, headaches, light-headedness and weakness.   Psychiatric/Behavioral: Negative.     Objective  Blood pressure 154/76, pulse 68, resp. rate 16, height 157.5 cm (62\"), weight 78.5 kg (173 lb), SpO2 95 %.  Body mass index is 31.64 kg/mý.    Vitals reviewed.   Constitutional:       Appearance: Well-developed.   Eyes:      Conjunctiva/sclera: Conjunctivae normal.   HENT:      Head: " Normocephalic.   Neck:      Thyroid: No thyromegaly.      Vascular: No JVD.      Trachea: No tracheal deviation.   Pulmonary:      Effort: No respiratory distress.      Breath sounds: Normal breath sounds. No wheezing. No rales.   Cardiovascular:      PMI at left midclavicular line. Normal rate. Regular rhythm. Normal S1. Normal S2.       Murmurs: There is no murmur.      No gallop.  No click. No rub.   Pulses:     Intact distal pulses.   Edema:     Pretibial: bilateral 1+ edema of the pretibial area.     Ankle: bilateral 1+ edema of the ankle.     Feet: bilateral 1+ edema of the feet.  Abdominal:      General: Bowel sounds are normal.      Palpations: Abdomen is soft. There is no abdominal mass.      Tenderness: There is no abdominal tenderness.   Musculoskeletal:      Cervical back: Normal range of motion and neck supple. Skin:     General: Skin is warm and dry.   Neurological:      Mental Status: Alert and oriented to person, place, and time.      Cranial Nerves: No cranial nerve deficit.       Lab Results   Component Value Date     02/12/2021    K 4.3 02/12/2021     02/12/2021    CO2 18.9 (L) 02/12/2021    BUN 16 02/12/2021    CREATININE 0.85 02/12/2021    GLUCOSE 102 (H) 02/12/2021    CALCIUM 9.5 02/12/2021    AST 25 02/12/2021    ALT 18 02/12/2021    ALKPHOS 95 02/12/2021     No results found for: CKTOTAL  Lab Results   Component Value Date    WBC 9.88 02/12/2021    HGB 15.4 02/12/2021    HCT 50.3 (H) 02/12/2021     02/12/2021       Lab Results   Component Value Date    TSH 1.410 02/12/2021      No results found for: BNP    ECG 12 Lead    Date/Time: 8/29/2023 12:53 PM  Performed by: Damian Novak MD  Authorized by: Damian Novak MD   Comparison: compared with previous ECG from 2/12/2021  Similar to previous ECG  Rhythm: sinus rhythm  BPM: 66  Other findings: low voltage            Assessment & Plan   Diagnosis Plan   1. Dyspnea on exertion (NYHA class III)  CT Chest Without Contrast,  BNP    Adult Transthoracic Echo Complete       2. Interstitial lung disease        3. Essential hypertension            Recommendations  Orders Placed This Encounter   Procedures    CT Chest Without Contrast    BNP    ECG 12 Lead    Adult Transthoracic Echo Complete w/ Color, Spectral and Contrast if necessary per protocol      We will evaluate her dyspnea further with a proBNP, CT scan of the chest without contrast and echo Doppler study.  We will add bumetanide 1 mg daily for 5 days and then stop.  Check CMP.  We will refer her to a pulmonologist to address her interstitial lung disease.    Return in about 4 weeks (around 9/26/2023).    As always, Marisa Arias APRN  I appreciate very much the opportunity to participate in the cardiovascular care of your patients. Please do not hesitate to call me with any questions with regards to Gregoria Pendleton's evaluation and management.     With Best Regards,        Damian Novak MD, [facility]    Please note that portions of this note were completed with a voice recognition program.

## 2023-09-18 ENCOUNTER — TELEPHONE (OUTPATIENT)
Dept: CARDIOLOGY | Facility: CLINIC | Age: 78
End: 2023-09-18
Payer: MEDICARE

## 2023-09-29 ENCOUNTER — HOSPITAL ENCOUNTER (OUTPATIENT)
Dept: CARDIOLOGY | Facility: HOSPITAL | Age: 78
Discharge: HOME | End: 2023-09-29
Payer: MEDICARE

## 2023-09-29 ENCOUNTER — HOSPITAL ENCOUNTER (OUTPATIENT)
Dept: CT IMAGING | Facility: HOSPITAL | Age: 78
Discharge: HOME | End: 2023-09-29
Payer: MEDICARE

## 2023-09-29 DIAGNOSIS — R06.09 DYSPNEA ON EXERTION: ICD-10-CM

## 2023-09-29 PROCEDURE — 93306 TTE W/DOPPLER COMPLETE: CPT

## 2023-09-29 PROCEDURE — 71250 CT THORAX DX C-: CPT

## 2023-10-01 LAB
BH CV ECHO MEAS - AO MAX PG: 5.9 MMHG
BH CV ECHO MEAS - AO MEAN PG: 4 MMHG
BH CV ECHO MEAS - AO ROOT DIAM: 2.5 CM
BH CV ECHO MEAS - AO V2 MAX: 121 CM/SEC
BH CV ECHO MEAS - AO V2 VTI: 30.5 CM
BH CV ECHO MEAS - EDV(CUBED): 35.9 ML
BH CV ECHO MEAS - EDV(MOD-SP4): 22.2 ML
BH CV ECHO MEAS - EF(MOD-SP4): 67.5 %
BH CV ECHO MEAS - ESV(CUBED): 12.2 ML
BH CV ECHO MEAS - ESV(MOD-SP4): 7.2 ML
BH CV ECHO MEAS - FS: 30.3 %
BH CV ECHO MEAS - IVS/LVPW: 1 CM
BH CV ECHO MEAS - IVSD: 1.4 CM
BH CV ECHO MEAS - LA DIMENSION: 2.7 CM
BH CV ECHO MEAS - LAT PEAK E' VEL: 8.9 CM/SEC
BH CV ECHO MEAS - LV MASS(C)D: 159.5 GRAMS
BH CV ECHO MEAS - LVIDD: 3.3 CM
BH CV ECHO MEAS - LVIDS: 2.3 CM
BH CV ECHO MEAS - LVOT AREA: 2.01 CM2
BH CV ECHO MEAS - LVOT DIAM: 1.6 CM
BH CV ECHO MEAS - LVPWD: 1.4 CM
BH CV ECHO MEAS - MED PEAK E' VEL: 5.6 CM/SEC
BH CV ECHO MEAS - MV A MAX VEL: 126 CM/SEC
BH CV ECHO MEAS - MV E MAX VEL: 127 CM/SEC
BH CV ECHO MEAS - MV E/A: 1.01
BH CV ECHO MEAS - PA ACC TIME: 0.16 SEC
BH CV ECHO MEAS - RAP SYSTOLE: 10 MMHG
BH CV ECHO MEAS - RVSP: 32.9 MMHG
BH CV ECHO MEAS - SV(MOD-SP4): 15 ML
BH CV ECHO MEAS - TAPSE (>1.6): 1.98 CM
BH CV ECHO MEAS - TR MAX PG: 22.9 MMHG
BH CV ECHO MEAS - TR MAX VEL: 238.7 CM/SEC
BH CV ECHO MEASUREMENTS AVERAGE E/E' RATIO: 17.52

## 2023-10-05 DIAGNOSIS — J84.9 INTERSTITIAL LUNG DISEASE: Primary | ICD-10-CM

## 2023-10-12 ENCOUNTER — TELEPHONE (OUTPATIENT)
Dept: CARDIOLOGY | Facility: CLINIC | Age: 78
End: 2023-10-12
Payer: MEDICARE

## 2023-10-12 NOTE — TELEPHONE ENCOUNTER
Called patient to advise to get her BNP drawn prior to Tuesday's appt.  She verbalized understanding.

## 2023-10-17 ENCOUNTER — OFFICE VISIT (OUTPATIENT)
Dept: CARDIOLOGY | Facility: CLINIC | Age: 78
End: 2023-10-17
Payer: MEDICARE

## 2023-10-17 ENCOUNTER — TELEPHONE (OUTPATIENT)
Dept: CARDIOLOGY | Facility: CLINIC | Age: 78
End: 2023-10-17
Payer: MEDICARE

## 2023-10-17 VITALS
DIASTOLIC BLOOD PRESSURE: 76 MMHG | WEIGHT: 174.4 LBS | BODY MASS INDEX: 32.09 KG/M2 | HEART RATE: 73 BPM | OXYGEN SATURATION: 96 % | SYSTOLIC BLOOD PRESSURE: 138 MMHG | HEIGHT: 62 IN

## 2023-10-17 DIAGNOSIS — I10 ESSENTIAL HYPERTENSION: ICD-10-CM

## 2023-10-17 DIAGNOSIS — J84.10 PULMONARY FIBROSIS: Primary | ICD-10-CM

## 2023-10-17 PROCEDURE — 3075F SYST BP GE 130 - 139MM HG: CPT | Performed by: PHYSICIAN ASSISTANT

## 2023-10-17 PROCEDURE — 1159F MED LIST DOCD IN RCRD: CPT | Performed by: PHYSICIAN ASSISTANT

## 2023-10-17 PROCEDURE — 99213 OFFICE O/P EST LOW 20 MIN: CPT | Performed by: PHYSICIAN ASSISTANT

## 2023-10-17 PROCEDURE — 3078F DIAST BP <80 MM HG: CPT | Performed by: PHYSICIAN ASSISTANT

## 2023-10-17 PROCEDURE — 1160F RVW MEDS BY RX/DR IN RCRD: CPT | Performed by: PHYSICIAN ASSISTANT

## 2023-10-17 NOTE — PROGRESS NOTES
Marisa Arias APRN  Gregoria Pendleton  1945  10/17/2023    Patient Active Problem List   Diagnosis    Pulmonary fibrosis    Essential hypertension       Dear Marisa Arias APRN:    Subjective     History of Present Illness:    Chief Complaint   Patient presents with    Results     ECHO AND LABS       Gregoria Pendleton is a pleasant 78 y.o. female with a past medical history significant for pulmonary fibrosis, essential hypertension, diabetes mellitus.  She comes in today for cardiology follow-up.    Gregoria did have echocardiogram performed which showed normal LVEF with no significant valvular abnormalities.  However CT of the chest did reveal advanced pulmonary fibrosis pattern consistent with NSIP.  However also did reveal interstitial thickening consistent with mild edema.  She does report she has been chronically short of breath since possible COVID infection in 2019.  She denies orthopnea, PND, or pedal edema.  She was given 5 days of 1 mg Bumex at last visit she does report this marginally improved her breathing but still has significant shortness of breath with even minimal activities.  She denies any overt chest pains or palpitations.    Allergies   Allergen Reactions    Ampicillin Diarrhea   :      Current Outpatient Medications:     amlodipine-olmesartan (ARVIN) 5-40 MG per tablet, , Disp: , Rfl:     aspirin 81 MG chewable tablet, Chew 1 tablet Daily., Disp: , Rfl:     atenolol (TENORMIN) 25 MG tablet, Take 1 tablet by mouth Daily., Disp: , Rfl:     bumetanide (BUMEX) 1 MG tablet, Take 1 tablet by mouth Daily., Disp: 30 tablet, Rfl: 2    cetirizine (zyrTEC) 10 MG tablet, Take 1 tablet by mouth Daily., Disp: , Rfl:     cloNIDine (CATAPRES) 0.2 MG tablet, 1 tablet 3 (Three) Times a Day., Disp: , Rfl:     Dulaglutide (Trulicity) 3 MG/0.5ML solution pen-injector, Inject 0.5 mL under the skin into the appropriate area as directed., Disp: , Rfl:     DULoxetine (CYMBALTA) 60 MG capsule, Take 1 capsule  "by mouth Daily., Disp: , Rfl:     Emgality 120 MG/ML auto-injector pen, , Disp: , Rfl:     estradiol (ESTRACE) 1 MG tablet, Take 1 tablet by mouth Daily., Disp: , Rfl:     levothyroxine (SYNTHROID, LEVOTHROID) 100 MCG tablet, Take 75 mcg by mouth Daily., Disp: , Rfl:     linaclotide (LINZESS) 290 MCG capsule capsule, Take 1 capsule by mouth Every Morning Before Breakfast., Disp: , Rfl:     lovastatin (MEVACOR) 40 MG tablet, TAKE 1 TABLET EVERY NIGHT, Disp: 90 tablet, Rfl: 3    omeprazole (priLOSEC) 40 MG capsule, , Disp: , Rfl:     spironolactone (ALDACTONE) 50 MG tablet, Take 1 tablet by mouth 2 (Two) Times a Day., Disp: , Rfl:     tiZANidine (ZANAFLEX) 4 MG tablet, Take 1 tablet by mouth At Night As Needed for Muscle Spasms., Disp: , Rfl:     ubrogepant (ubrogepant) 50 MG tablet, Take 1 tablet by mouth As Needed (At the onset of her headache.  May repeat once in 2 hours.  Do not take over 2 tabs a day or 8 tabs a month)., Disp: 8 tablet, Rfl: 3    The following portions of the patient's history were reviewed and updated as appropriate: allergies, current medications, past family history, past medical history, past social history, past surgical history and problem list.    Social History     Tobacco Use    Smoking status: Never    Smokeless tobacco: Never   Vaping Use    Vaping Use: Never used   Substance Use Topics    Alcohol use: Never    Drug use: Never         Objective   Vitals:    10/17/23 1241   BP: 138/76   Pulse: 73   SpO2: 96%   Weight: 79.1 kg (174 lb 6.4 oz)   Height: 157.5 cm (62\")     Body mass index is 31.9 kg/m².    Constitutional:       General: Not in acute distress.     Appearance: Healthy appearance. Well-developed and not in distress. Not diaphoretic.   Eyes:      Conjunctiva/sclera: Conjunctivae normal.      Pupils: Pupils are equal, round, and reactive to light.   HENT:      Head: Normocephalic and atraumatic.   Neck:      Vascular: No carotid bruit or JVD.   Pulmonary:      Effort: Pulmonary " "effort is normal. No respiratory distress.      Breath sounds: Normal breath sounds.   Cardiovascular:      Normal rate. Regular rhythm.   Edema:     Peripheral edema absent.   Skin:     General: Skin is cool.   Neurological:      Mental Status: Alert, oriented to person, place, and time and oriented to person, place and time.         Lab Results   Component Value Date     02/12/2021    K 4.3 02/12/2021     02/12/2021    CO2 18.9 (L) 02/12/2021    BUN 16 02/12/2021    CREATININE 0.85 02/12/2021    GLUCOSE 102 (H) 02/12/2021    CALCIUM 9.5 02/12/2021    AST 25 02/12/2021    ALT 18 02/12/2021    ALKPHOS 95 02/12/2021     No results found for: \"CKTOTAL\"  Lab Results   Component Value Date    WBC 9.88 02/12/2021    HGB 15.4 02/12/2021    HCT 50.3 (H) 02/12/2021     02/12/2021     No results found for: \"INR\"  No results found for: \"MG\"  Lab Results   Component Value Date    TSH 1.410 02/12/2021      No results found for: \"BNP\"    During this visit the following were done:  Labs Reviewed []    Labs Ordered []    Radiology Reports Reviewed []    Radiology Ordered []    PCP Records Reviewed []    Referring Provider Records Reviewed []    ER Records Reviewed []    Hospital Records Reviewed []    History Obtained From Family []    Radiology Images Reviewed []    Other Reviewed []    Records Requested []       Procedures    Assessment & Plan    Diagnosis Plan   1. Pulmonary fibrosis        2. Essential hypertension                 Recommendations:  Dyspnea  Suspect this is mostly pulmonology in etiology given advanced pulmonary fibrosis seen on CT scan.  She did report very mild improvement in dyspnea with addition of Bumex pending pulmonology evaluation could certainly consider low-dose daily loop diuretic.    Return in about 3 months (around 1/17/2024).    As always, I appreciate very much the opportunity to participate in the cardiovascular care of your patients.      With Best Regards,    Ken Ordonez, " NA

## 2023-10-17 NOTE — TELEPHONE ENCOUNTER
----- Message from Ken Ordonez PA-C sent at 10/17/2023  1:03 PM EDT -----  Can we get labs from pcp

## 2023-10-20 ENCOUNTER — OFFICE VISIT (OUTPATIENT)
Dept: PULMONOLOGY | Facility: CLINIC | Age: 78
End: 2023-10-20
Payer: MEDICARE

## 2023-10-20 ENCOUNTER — TELEPHONE (OUTPATIENT)
Dept: PULMONOLOGY | Facility: CLINIC | Age: 78
End: 2023-10-20
Payer: MEDICARE

## 2023-10-20 VITALS
OXYGEN SATURATION: 91 % | BODY MASS INDEX: 30.83 KG/M2 | TEMPERATURE: 96.8 F | WEIGHT: 174 LBS | SYSTOLIC BLOOD PRESSURE: 108 MMHG | HEIGHT: 63 IN | HEART RATE: 77 BPM | DIASTOLIC BLOOD PRESSURE: 80 MMHG

## 2023-10-20 DIAGNOSIS — J84.10 PULMONARY FIBROSIS: Primary | ICD-10-CM

## 2023-10-20 DIAGNOSIS — E66.9 OBESITY (BMI 30-39.9): ICD-10-CM

## 2023-10-20 DIAGNOSIS — R09.02 HYPOXIA: ICD-10-CM

## 2023-10-20 RX ORDER — ROPINIROLE 0.5 MG/1
TABLET, FILM COATED ORAL
COMMUNITY
Start: 2023-09-30

## 2023-10-20 RX ORDER — SUMATRIPTAN 100 MG/1
TABLET, FILM COATED ORAL
COMMUNITY
Start: 2023-07-20

## 2023-10-20 RX ORDER — OMEGA-3-ACID ETHYL ESTERS 1 G/1
CAPSULE, LIQUID FILLED ORAL
COMMUNITY
Start: 2023-10-10

## 2023-10-20 RX ORDER — LINACLOTIDE 145 UG/1
CAPSULE, GELATIN COATED ORAL
COMMUNITY
Start: 2023-09-30

## 2023-10-20 RX ORDER — LOVASTATIN 20 MG/1
TABLET ORAL
COMMUNITY
Start: 2023-09-30

## 2023-10-20 NOTE — PROGRESS NOTES
"Chief Complaint  Abnormal Imaging (Chest XRay)    Subjective        Gregoria Pendleton presents to [facility] PULMONARY & CRITICAL CARE MEDICINE  History of Present Illness    Ms. Pendleton is a 78 year old female with a medical history significant for hypertension, hyperlipidemia, Migraine, stroke and hypothyroidism.    She presents today for evaluation of abnormal imaging of the chest.  She also reports that she has shortness of breath.  She reports that she has had shortness of breath for several years and it has gradually gotten worse.  She states that she had a flu like illness in 2020 and she has had the shortness of breath since.  She also tells me that she took 2 COVID vaccines and then had a stroke.  She is a life long non smoker. She reports that her shortness of breath is worse at night and with exertion.    Objective   Vital Signs:  /80 (BP Location: Left arm, Patient Position: Sitting)   Pulse 77   Temp 96.8 °F (36 °C)   Ht 158.8 cm (62.5\")   Wt 78.9 kg (174 lb)   SpO2 91%   BMI 31.32 kg/m²   Estimated body mass index is 31.32 kg/m² as calculated from the following:    Height as of this encounter: 158.8 cm (62.5\").    Weight as of this encounter: 78.9 kg (174 lb).       BMI is >= 30 and <35. (Class 1 Obesity). The following options were offered after discussion;: exercise counseling/recommendations and nutrition counseling/recommendations      Physical Exam     GENERAL APPEARANCE: Well developed, well nourished, alert and cooperative, and appears to be in no acute distress.    HEAD: normocephalic. Atraumatic.    EYES: PERRL, EOMI. Vision is grossly intact.    THROAT: Oral cavity and pharynx normal. No inflammation, swelling, exudate, or lesions.     NECK: Neck supple.  No thyromegaly.    CARDIAC: Normal S1 and S2. No S3, S4 or murmurs. Rhythm is regular.     RESPIRATORY:Bilateral air entry positive. Bilateral diminished breath sounds. No wheezing, crackles or rhonchi noted.    GI: " Positive bowel sounds. Soft, nondistended, nontender.     MUSCULOSKELETAL: No significant deformity or joint abnormality. No edema. Peripheral pulses intact. No varicosities.    NEUROLOGICAL: Strength and sensation symmetric and intact throughout.     PSYCHIATRIC: The mental examination revealed the patient was oriented to person, place, and time.     Result Review :  The following data was reviewed by: SHAD Salgado on 10/20/2023:                 Assessment and Plan   Diagnoses and all orders for this visit:    1. Pulmonary fibrosis (Primary)  -     6 Minute Walk Test  -     CT chest hi resolution; Future  -     Complete PFT - Pre & Post Bronchodilator; Future  -     SHON With / DsDNA, RNP, Sjogrens A / B, Laughlin; Future  -     ANCA Panel; Future  -     Comprehensive Metabolic Panel; Future  -     CBC & Differential; Future  -     Cyclic Citrul Peptide Antibody, IgG / IgA; Future  -     Hypersensitivity Pneumonitis Profile; Future  -     Rheumatoid Factor; Future  -     Sjogrens Syndrome-A Extractable Nuclear Antibody; Future  -     Sjogrens Syndrome-B Extractable Nuclear Antibody; Future  -     Oxygen Therapy    2. Hypoxia    3. Obesity (BMI 30-39.9)        CT chest was reviewed and showed advances pulmonary fibrosis with decreased lung volumes.  Pattern is more favorable for NSIP.  She is also noted to have mediastinal adenopathy.    I am ordering a hi resolution CT chest for further evaluation of pulmonary fibrosis.    Ordered PFT to assess lung function.    Ordered auto-immune lab work.    6 MWT was completed in office.  She was noted to drop to 88% during ambulation.  Will start her on supplemental oxygen at night and with exertion.      Follow Up   Return in about 8 weeks (around 12/15/2023).  Patient was given instructions and counseling regarding her condition or for health maintenance advice. Please see specific information pulled into the AVS if appropriate.

## 2023-10-20 NOTE — TELEPHONE ENCOUNTER
"Relay     \"Ailyn ordered some labs while you here, please make sure to get those completed. \"              ;  "

## 2023-10-20 NOTE — TELEPHONE ENCOUNTER
----- Message from SHAD Salgado sent at 10/20/2023 12:39 PM EDT -----  Will you let her know that I will still need her to get lab work.  I pur the orders in.

## 2023-10-23 ENCOUNTER — LAB (OUTPATIENT)
Dept: LAB | Facility: HOSPITAL | Age: 78
End: 2023-10-23
Payer: MEDICARE

## 2023-10-23 DIAGNOSIS — J84.10 PULMONARY FIBROSIS: ICD-10-CM

## 2023-10-23 PROCEDURE — 86331 IMMUNODIFFUSION OUCHTERLONY: CPT

## 2023-10-23 PROCEDURE — 86609 BACTERIUM ANTIBODY: CPT

## 2023-10-23 PROCEDURE — 86431 RHEUMATOID FACTOR QUANT: CPT

## 2023-10-23 PROCEDURE — 86235 NUCLEAR ANTIGEN ANTIBODY: CPT

## 2023-10-23 PROCEDURE — 86602 ANTINOMYCES ANTIBODY: CPT

## 2023-10-23 PROCEDURE — 36415 COLL VENOUS BLD VENIPUNCTURE: CPT

## 2023-10-23 PROCEDURE — 86038 ANTINUCLEAR ANTIBODIES: CPT

## 2023-10-23 PROCEDURE — 83516 IMMUNOASSAY NONANTIBODY: CPT

## 2023-10-23 PROCEDURE — 86037 ANCA TITER EACH ANTIBODY: CPT

## 2023-10-23 PROCEDURE — 80053 COMPREHEN METABOLIC PANEL: CPT

## 2023-10-23 PROCEDURE — 85025 COMPLETE CBC W/AUTO DIFF WBC: CPT

## 2023-10-23 PROCEDURE — 86606 ASPERGILLUS ANTIBODY: CPT

## 2023-10-23 PROCEDURE — 86200 CCP ANTIBODY: CPT

## 2023-10-23 PROCEDURE — 86671 FUNGUS NES ANTIBODY: CPT

## 2023-10-24 LAB
ALBUMIN SERPL-MCNC: 4.6 G/DL (ref 3.5–5.2)
ALBUMIN/GLOB SERPL: 1.5 G/DL
ALP SERPL-CCNC: 83 U/L (ref 39–117)
ALT SERPL W P-5'-P-CCNC: 16 U/L (ref 1–33)
ANA SER QL: NEGATIVE
ANION GAP SERPL CALCULATED.3IONS-SCNC: 12 MMOL/L (ref 5–15)
AST SERPL-CCNC: 20 U/L (ref 1–32)
BASOPHILS # BLD AUTO: 0.08 10*3/MM3 (ref 0–0.2)
BASOPHILS NFR BLD AUTO: 0.8 % (ref 0–1.5)
BILIRUB SERPL-MCNC: 0.5 MG/DL (ref 0–1.2)
BUN SERPL-MCNC: 26 MG/DL (ref 8–23)
BUN/CREAT SERPL: 25.5 (ref 7–25)
C-ANCA TITR SER IF: NORMAL TITER
CALCIUM SPEC-SCNC: 10.2 MG/DL (ref 8.6–10.5)
CCP IGA+IGG SERPL IA-ACNC: 5 UNITS (ref 0–19)
CHLORIDE SERPL-SCNC: 102 MMOL/L (ref 98–107)
CHROMATIN AB SERPL-ACNC: <10 IU/ML (ref 0–14)
CO2 SERPL-SCNC: 27 MMOL/L (ref 22–29)
CREAT SERPL-MCNC: 1.02 MG/DL (ref 0.57–1)
DEPRECATED RDW RBC AUTO: 41.1 FL (ref 37–54)
EGFRCR SERPLBLD CKD-EPI 2021: 57.1 ML/MIN/1.73
ENA SS-A AB SER-ACNC: <0.2 AI (ref 0–0.9)
ENA SS-B AB SER-ACNC: <0.2 AI (ref 0–0.9)
EOSINOPHIL # BLD AUTO: 0.27 10*3/MM3 (ref 0–0.4)
EOSINOPHIL NFR BLD AUTO: 2.6 % (ref 0.3–6.2)
ERYTHROCYTE [DISTWIDTH] IN BLOOD BY AUTOMATED COUNT: 12.6 % (ref 12.3–15.4)
GLOBULIN UR ELPH-MCNC: 3 GM/DL
GLUCOSE SERPL-MCNC: 113 MG/DL (ref 65–99)
HCT VFR BLD AUTO: 46.5 % (ref 34–46.6)
HGB BLD-MCNC: 15.3 G/DL (ref 12–15.9)
IMM GRANULOCYTES # BLD AUTO: 0.02 10*3/MM3 (ref 0–0.05)
IMM GRANULOCYTES NFR BLD AUTO: 0.2 % (ref 0–0.5)
LYMPHOCYTES # BLD AUTO: 2.96 10*3/MM3 (ref 0.7–3.1)
LYMPHOCYTES NFR BLD AUTO: 28 % (ref 19.6–45.3)
MCH RBC QN AUTO: 29.4 PG (ref 26.6–33)
MCHC RBC AUTO-ENTMCNC: 32.9 G/DL (ref 31.5–35.7)
MCV RBC AUTO: 89.4 FL (ref 79–97)
MONOCYTES # BLD AUTO: 0.48 10*3/MM3 (ref 0.1–0.9)
MONOCYTES NFR BLD AUTO: 4.5 % (ref 5–12)
MYELOPEROXIDASE AB SER IA-ACNC: <0.2 UNITS (ref 0–0.9)
NEUTROPHILS NFR BLD AUTO: 6.75 10*3/MM3 (ref 1.7–7)
NEUTROPHILS NFR BLD AUTO: 63.9 % (ref 42.7–76)
NRBC BLD AUTO-RTO: 0 /100 WBC (ref 0–0.2)
P-ANCA ATYPICAL TITR SER IF: NORMAL TITER
P-ANCA TITR SER IF: NORMAL TITER
PLATELET # BLD AUTO: 398 10*3/MM3 (ref 140–450)
PMV BLD AUTO: 10.4 FL (ref 6–12)
POTASSIUM SERPL-SCNC: 4.4 MMOL/L (ref 3.5–5.2)
PROT SERPL-MCNC: 7.6 G/DL (ref 6–8.5)
PROTEINASE3 AB SER IA-ACNC: <0.2 UNITS (ref 0–0.9)
RBC # BLD AUTO: 5.2 10*6/MM3 (ref 3.77–5.28)
SODIUM SERPL-SCNC: 141 MMOL/L (ref 136–145)
WBC NRBC COR # BLD: 10.56 10*3/MM3 (ref 3.4–10.8)

## 2023-10-26 ENCOUNTER — TELEPHONE (OUTPATIENT)
Dept: PULMONOLOGY | Facility: CLINIC | Age: 78
End: 2023-10-26
Payer: MEDICARE

## 2023-10-26 DIAGNOSIS — J96.21 ACUTE ON CHRONIC RESPIRATORY FAILURE WITH HYPOXIA: Primary | ICD-10-CM

## 2023-10-26 NOTE — TELEPHONE ENCOUNTER
Patient called and stated her oxygen was causing her to have nose bleeds.     She also stated that she was having a hard time pulling and pushing her oxygen tanks around, it makes it hard for her to attend [employer] and complete daily task.      Spoke with provider and she instructed to place a order for a humidifier to bleed in with her oxygen and a POC to hopefully help with her mobility.    Relayed information to  and she voiced understanding and per her instructions I faxed an order to [facility].

## 2023-10-27 LAB
A FUMIGATUS IGG SER QL: NEGATIVE
A PULLULANS IGG SER QL: NEGATIVE
LACEYELLA SACCHARI AB SER QL ID: NEGATIVE
PIGEON SERUM IGG QL: NEGATIVE
S RECTIVIRGULA IGG SER QL ID: NEGATIVE
T VULGARIS IGG SER QL: NEGATIVE

## 2023-11-01 ENCOUNTER — TELEPHONE (OUTPATIENT)
Dept: PULMONOLOGY | Facility: CLINIC | Age: 78
End: 2023-11-01
Payer: MEDICARE

## 2023-11-16 ENCOUNTER — HOSPITAL ENCOUNTER (OUTPATIENT)
Dept: CT IMAGING | Facility: HOSPITAL | Age: 78
Discharge: HOME | End: 2023-11-16
Payer: MEDICARE

## 2023-11-16 ENCOUNTER — HOSPITAL ENCOUNTER (OUTPATIENT)
Dept: RESPIRATORY THERAPY | Facility: HOSPITAL | Age: 78
Discharge: HOME | End: 2023-11-16
Payer: MEDICARE

## 2023-11-16 DIAGNOSIS — J84.10 PULMONARY FIBROSIS: ICD-10-CM

## 2023-11-16 PROCEDURE — 94729 DIFFUSING CAPACITY: CPT

## 2023-11-16 PROCEDURE — 94640 AIRWAY INHALATION TREATMENT: CPT

## 2023-11-16 PROCEDURE — 94060 EVALUATION OF WHEEZING: CPT

## 2023-11-16 PROCEDURE — 94726 PLETHYSMOGRAPHY LUNG VOLUMES: CPT

## 2023-11-16 PROCEDURE — 71250 CT THORAX DX C-: CPT

## 2023-11-16 RX ORDER — ALBUTEROL SULFATE 2.5 MG/3ML
2.5 SOLUTION RESPIRATORY (INHALATION) ONCE
Status: COMPLETED | OUTPATIENT
Start: 2023-11-16 | End: 2023-11-16

## 2023-11-16 RX ADMIN — ALBUTEROL SULFATE 2.5 MG: 2.5 SOLUTION RESPIRATORY (INHALATION) at 11:27

## 2023-12-13 ENCOUNTER — TRANSCRIBE ORDERS (OUTPATIENT)
Dept: ADMINISTRATIVE | Facility: HOSPITAL | Age: 78
End: 2023-12-13
Payer: MEDICARE

## 2023-12-13 DIAGNOSIS — R31.9 HEMATURIA SYNDROME: Primary | ICD-10-CM

## 2023-12-14 ENCOUNTER — HOSPITAL ENCOUNTER (OUTPATIENT)
Facility: HOSPITAL | Age: 78
Discharge: HOME | End: 2023-12-14
Admitting: NURSE PRACTITIONER
Payer: MEDICARE

## 2023-12-14 DIAGNOSIS — R31.9 HEMATURIA SYNDROME: ICD-10-CM

## 2023-12-14 PROCEDURE — 74176 CT ABD & PELVIS W/O CONTRAST: CPT

## 2024-01-05 ENCOUNTER — OFFICE VISIT (OUTPATIENT)
Dept: PULMONOLOGY | Facility: CLINIC | Age: 79
End: 2024-01-05
Payer: MEDICARE

## 2024-01-05 VITALS
TEMPERATURE: 97.2 F | OXYGEN SATURATION: 98 % | BODY MASS INDEX: 30.83 KG/M2 | DIASTOLIC BLOOD PRESSURE: 74 MMHG | WEIGHT: 174 LBS | SYSTOLIC BLOOD PRESSURE: 126 MMHG | HEART RATE: 74 BPM | HEIGHT: 63 IN

## 2024-01-05 DIAGNOSIS — J84.10 PULMONARY FIBROSIS: Primary | ICD-10-CM

## 2024-01-05 DIAGNOSIS — R09.02 HYPOXIA: ICD-10-CM

## 2024-01-05 DIAGNOSIS — E66.9 OBESITY (BMI 30-39.9): ICD-10-CM

## 2024-01-05 PROCEDURE — 3074F SYST BP LT 130 MM HG: CPT | Performed by: NURSE PRACTITIONER

## 2024-01-05 PROCEDURE — 99214 OFFICE O/P EST MOD 30 MIN: CPT | Performed by: NURSE PRACTITIONER

## 2024-01-05 PROCEDURE — 1160F RVW MEDS BY RX/DR IN RCRD: CPT | Performed by: NURSE PRACTITIONER

## 2024-01-05 PROCEDURE — 3078F DIAST BP <80 MM HG: CPT | Performed by: NURSE PRACTITIONER

## 2024-01-05 PROCEDURE — 1159F MED LIST DOCD IN RCRD: CPT | Performed by: NURSE PRACTITIONER

## 2024-01-05 RX ORDER — TIRZEPATIDE 10 MG/.5ML
INJECTION, SOLUTION SUBCUTANEOUS
COMMUNITY
Start: 2023-12-13

## 2024-01-05 NOTE — PROGRESS NOTES
"Chief Complaint  Pulmonary fibrosis    Subjective        Gregoria Pendleton presents to [facility] PULMONARY & CRITICAL CARE MEDICINE  History of Present Illness    Ms. Pendleton is a 78 year old female with a medical history significant for hyperlipidemia, hypertension, Migraine and stroke.    She presents today for follow up on pulmonary fibrosis. She states that overall she has been doing well since her last visit.  She states that her breathing is at baseline.  She reports that she is using her supplemental oxygen at night and during the day as needed.    Objective   Vital Signs:  /74   Pulse 74   Temp 97.2 °F (36.2 °C)   Ht 158.8 cm (62.5\")   Wt 78.9 kg (174 lb)   SpO2 98%   BMI 31.32 kg/m²   Estimated body mass index is 31.32 kg/m² as calculated from the following:    Height as of this encounter: 158.8 cm (62.5\").    Weight as of this encounter: 78.9 kg (174 lb).               Physical Exam     GENERAL APPEARANCE: Well developed, well nourished, alert and cooperative, and appears to be in no acute distress.    HEAD: normocephalic. Atraumatic.    EYES: PERRL, EOMI. Vision is grossly intact.    THROAT: Oral cavity and pharynx normal. No inflammation, swelling, exudate, or lesions.     NECK: Neck supple.  No thyromegaly.    CARDIAC: Normal S1 and S2. No S3, S4 or murmurs. Rhythm is regular.     RESPIRATORY:Bilateral air entry positive. Bilateral diminished breath sounds. No wheezing, crackles or rhonchi noted.    GI: Positive bowel sounds. Soft, nondistended, nontender.     MUSCULOSKELETAL: No significant deformity or joint abnormality. No edema. Peripheral pulses intact. No varicosities.    NEUROLOGICAL: Strength and sensation symmetric and intact throughout.     PSYCHIATRIC: The mental examination revealed the patient was oriented to person, place, and time.     Result Review :  The following data was reviewed by: SHAD Salgado on 01/05/2024:  Common labs          10/23/2023 "    11:10   Common Labs   Glucose 113    BUN 26    Creatinine 1.02    Sodium 141    Potassium 4.4    Chloride 102    Calcium 10.2    Albumin 4.6    Total Bilirubin 0.5    Alkaline Phosphatase 83    AST (SGOT) 20    ALT (SGPT) 16    WBC 10.56    Hemoglobin 15.3    Hematocrit 46.5    Platelets 398      Data reviewed : Radiologic studies CT Chest             Assessment and Plan   Diagnoses and all orders for this visit:    1. Pulmonary fibrosis (Primary)    2. Hypoxia    3. Obesity (BMI 30-39.9)        Chest imaging was dicussed with her in detail. NSIP pattern is noted.  She is noted to have advance pulmonary fibrosis.    Lab work up was also discussed.    WE discussed the initiation of OFEV for pulmonary fibrosis.  She is agreeable with this plan.  Started paperwork for insurance approval.    She is compliant with supplemental oxygen at night and as needed with exertion.    Follow Up   Return in about 3 months (around 4/5/2024).  Patient was given instructions and counseling regarding her condition or for health maintenance advice. Please see specific information pulled into the AVS if appropriate.

## 2024-01-15 RX ORDER — NINTEDANIB 150 MG/1
CAPSULE ORAL
Qty: 60 CAPSULE | Refills: 3 | Status: SHIPPED | OUTPATIENT
Start: 2024-01-15

## 2024-01-17 ENCOUNTER — OFFICE VISIT (OUTPATIENT)
Dept: CARDIOLOGY | Facility: CLINIC | Age: 79
End: 2024-01-17
Payer: MEDICARE

## 2024-01-17 VITALS
HEART RATE: 62 BPM | WEIGHT: 170 LBS | OXYGEN SATURATION: 98 % | SYSTOLIC BLOOD PRESSURE: 144 MMHG | DIASTOLIC BLOOD PRESSURE: 66 MMHG | BODY MASS INDEX: 30.12 KG/M2 | HEIGHT: 63 IN

## 2024-01-17 DIAGNOSIS — R00.2 PALPITATIONS: ICD-10-CM

## 2024-01-17 DIAGNOSIS — R42 DIZZINESS: ICD-10-CM

## 2024-01-17 DIAGNOSIS — I63.9 CRYPTOGENIC STROKE: ICD-10-CM

## 2024-01-17 DIAGNOSIS — I10 ESSENTIAL HYPERTENSION: Primary | ICD-10-CM

## 2024-01-17 RX ORDER — TAMSULOSIN HYDROCHLORIDE 0.4 MG/1
0.4 CAPSULE ORAL DAILY
COMMUNITY
Start: 2024-01-07 | End: 2024-02-06

## 2024-01-17 RX ORDER — CEFDINIR 300 MG/1
1 CAPSULE ORAL EVERY 12 HOURS SCHEDULED
COMMUNITY
Start: 2024-01-07

## 2024-01-17 NOTE — PROGRESS NOTES
Marisa Arias APRN  Gregoria Pendleton  1945 01/17/2024    Patient Active Problem List   Diagnosis    Pulmonary fibrosis    Essential hypertension       Dear Marisa Arias APRN:    Subjective     History of Present Illness:    No chief complaint on file.      Gregoria Pendleton is a pleasant 78 y.o. female with a past medical history significant for occipital CVA of unknown etiology in 2021.  She also has pulmonary fibrosis now following with pulmonology.  She herself denies any history of tobacco abuse or known exposures to pulmonary toxins.  She does have diabetes mellitus, essential hypertension, and dyslipidemia.  She comes in today for cardiology follow-up.    Gregoria comes in today reporting that she has follow-up with pulmonology and is being started on O FEV for her pulmonary fibrosis by pulmonology.  However she has yet to receive this through the mail.  She does deny any worsening shortness of breath from her baseline and denies any orthopnea, PND, or pedal edema.  She also denies any chest pains.      Allergies   Allergen Reactions    Ampicillin Diarrhea   :      Current Outpatient Medications:     amlodipine-olmesartan (ARVIN) 5-40 MG per tablet, , Disp: , Rfl:     aspirin 81 MG chewable tablet, Chew 1 tablet Daily., Disp: , Rfl:     atenolol (TENORMIN) 25 MG tablet, Take 1 tablet by mouth Daily., Disp: , Rfl:     bumetanide (BUMEX) 1 MG tablet, Take 1 tablet by mouth Daily., Disp: 30 tablet, Rfl: 2    cefdinir (OMNICEF) 300 MG capsule, Take 1 capsule by mouth Every 12 (Twelve) Hours., Disp: , Rfl:     cetirizine (zyrTEC) 10 MG tablet, Take 1 tablet by mouth Daily., Disp: , Rfl:     cloNIDine (CATAPRES) 0.2 MG tablet, 1 tablet 2 (Two) Times a Day., Disp: , Rfl:     DULoxetine (CYMBALTA) 60 MG capsule, Take 1 capsule by mouth Daily., Disp: , Rfl:     Emgality 120 MG/ML auto-injector pen, , Disp: , Rfl:     estradiol (ESTRACE) 1 MG tablet, Take 1 tablet by mouth Daily., Disp: , Rfl:      "levothyroxine (SYNTHROID, LEVOTHROID) 100 MCG tablet, Take 75 mcg by mouth Daily., Disp: , Rfl:     Linzess 145 MCG capsule capsule, , Disp: , Rfl:     lovastatin (MEVACOR) 20 MG tablet, , Disp: , Rfl:     Mounjaro 10 MG/0.5ML solution pen-injector pen, , Disp: , Rfl:     Nintedanib Esylate (Ofev) 150 MG capsule, Take 1 capsule twice daily (12 hours apart) with food., Disp: 60 capsule, Rfl: 3    omega-3 acid ethyl esters (LOVAZA) 1 g capsule, , Disp: , Rfl:     omeprazole (priLOSEC) 40 MG capsule, , Disp: , Rfl:     rOPINIRole (REQUIP) 0.5 MG tablet, , Disp: , Rfl:     spironolactone (ALDACTONE) 50 MG tablet, Take 1 tablet by mouth 2 (Two) Times a Day., Disp: , Rfl:     SUMAtriptan (IMITREX) 100 MG tablet, , Disp: , Rfl:     tamsulosin (FLOMAX) 0.4 MG capsule 24 hr capsule, Take 1 capsule by mouth Daily., Disp: , Rfl:     tiZANidine (ZANAFLEX) 4 MG tablet, Take 1 tablet by mouth At Night As Needed for Muscle Spasms., Disp: , Rfl:     ubrogepant (ubrogepant) 50 MG tablet, Take 1 tablet by mouth As Needed (At the onset of her headache.  May repeat once in 2 hours.  Do not take over 2 tabs a day or 8 tabs a month)., Disp: 8 tablet, Rfl: 3    The following portions of the patient's history were reviewed and updated as appropriate: allergies, current medications, past family history, past medical history, past social history, past surgical history and problem list.    Social History     Tobacco Use    Smoking status: Never     Passive exposure: Never    Smokeless tobacco: Never   Vaping Use    Vaping Use: Never used   Substance Use Topics    Alcohol use: Never    Drug use: Never         Objective   Vitals:    01/17/24 1053   BP: 144/66   Pulse: 62   SpO2: 98%   Weight: 77.1 kg (170 lb)   Height: 158.8 cm (62.5\")     Body mass index is 30.6 kg/m².    ROS    Constitutional:       General: Not in acute distress.     Appearance: Healthy appearance. Well-developed and not in distress. Not diaphoretic.   Eyes:      " "Conjunctiva/sclera: Conjunctivae normal.      Pupils: Pupils are equal, round, and reactive to light.   HENT:      Head: Normocephalic and atraumatic.   Neck:      Vascular: No carotid bruit or JVD.   Pulmonary:      Effort: Pulmonary effort is normal. No respiratory distress.      Breath sounds: Normal breath sounds.   Cardiovascular:      Normal rate. Regular rhythm.   Edema:     Peripheral edema absent.   Skin:     General: Skin is cool.   Neurological:      Mental Status: Alert, oriented to person, place, and time and oriented to person, place and time.         Lab Results   Component Value Date     10/23/2023    K 4.4 10/23/2023     10/23/2023    CO2 27.0 10/23/2023    BUN 26 (H) 10/23/2023    CREATININE 1.02 (H) 10/23/2023    GLUCOSE 113 (H) 10/23/2023    CALCIUM 10.2 10/23/2023    AST 20 10/23/2023    ALT 16 10/23/2023    ALKPHOS 83 10/23/2023     No results found for: \"CKTOTAL\"  Lab Results   Component Value Date    WBC 10.56 10/23/2023    HGB 15.3 10/23/2023    HCT 46.5 10/23/2023     10/23/2023     No results found for: \"INR\"  No results found for: \"MG\"  Lab Results   Component Value Date    TSH 1.410 02/12/2021      No results found for: \"BNP\"    During this visit the following were done:  Labs Reviewed []    Labs Ordered []    Radiology Reports Reviewed []    Radiology Ordered []    PCP Records Reviewed []    Referring Provider Records Reviewed []    ER Records Reviewed []    Hospital Records Reviewed []    History Obtained From Family []    Radiology Images Reviewed []    Other Reviewed []    Records Requested []       Procedures    Assessment & Plan    Diagnosis Plan   1. Essential hypertension  US Carotid Bilateral      2. Cryptogenic stroke  US Carotid Bilateral      3. Palpitations  US Carotid Bilateral    Cardiac Event Monitor      4. Dizziness  US Carotid Bilateral               Recommendations:  Cryptogenic stroke  She did have a CVA in 2021 she initially did follow with " neurology who did plan on ordering Holter monitor.  However unfortunately this was not completed.  Since then it does not look like she has ever been ruled out for occult atrial fibrillation nor evaluated for carotid artery disease.  I discussed with her about ruling both of these out today she was agreeable with proceeding so I did place order for a cardiac event monitor and carotid ultrasound.  Dyspnea secondary to pulmonary fibrosis  Again I did explain to her that dyspnea is most likely related to her pulmonary fibrosis so far cardiac workup has been unremarkable.  She does report some subjective improvement in her breathing since being on Bumex I will continue this and continue to follow  Essential hypertension  Borderline elevated today but has been controlled last couple visits so we will monitor for now.    No follow-ups on file.    As always, I appreciate very much the opportunity to participate in the cardiovascular care of your patients.      With Best Regards,    Ken Ordonez PA-C

## 2024-01-31 ENCOUNTER — HOSPITAL ENCOUNTER (OUTPATIENT)
Facility: HOSPITAL | Age: 79
Discharge: HOME | End: 2024-01-31
Admitting: PHYSICIAN ASSISTANT
Payer: MEDICARE

## 2024-01-31 DIAGNOSIS — I10 ESSENTIAL HYPERTENSION: ICD-10-CM

## 2024-01-31 DIAGNOSIS — R42 DIZZINESS: ICD-10-CM

## 2024-01-31 DIAGNOSIS — R00.2 PALPITATIONS: ICD-10-CM

## 2024-01-31 DIAGNOSIS — I63.9 CRYPTOGENIC STROKE: ICD-10-CM

## 2024-01-31 PROCEDURE — 93880 EXTRACRANIAL BILAT STUDY: CPT

## 2024-01-31 PROCEDURE — 93880 EXTRACRANIAL BILAT STUDY: CPT | Performed by: RADIOLOGY

## 2024-04-17 ENCOUNTER — TELEPHONE (OUTPATIENT)
Dept: CARDIOLOGY | Facility: CLINIC | Age: 79
End: 2024-04-17
Payer: MEDICARE

## 2024-04-17 ENCOUNTER — OFFICE VISIT (OUTPATIENT)
Dept: CARDIOLOGY | Facility: CLINIC | Age: 79
End: 2024-04-17
Payer: MEDICARE

## 2024-04-17 VITALS
BODY MASS INDEX: 28.7 KG/M2 | DIASTOLIC BLOOD PRESSURE: 79 MMHG | HEIGHT: 63 IN | SYSTOLIC BLOOD PRESSURE: 138 MMHG | OXYGEN SATURATION: 90 % | HEART RATE: 81 BPM | WEIGHT: 162 LBS

## 2024-04-17 DIAGNOSIS — I10 ESSENTIAL HYPERTENSION: Primary | ICD-10-CM

## 2024-04-17 DIAGNOSIS — I63.9 CRYPTOGENIC STROKE: ICD-10-CM

## 2024-04-17 NOTE — TELEPHONE ENCOUNTER
Requested.       ----- Message from Ken Ordonez PA-C sent at 4/17/2024  9:45 AM EDT -----  Can we get lipid panel from pcp?

## 2024-04-17 NOTE — PROGRESS NOTES
Marisa Airas APRN  Gregoria Pendleton  1945 04/17/2024    Patient Active Problem List   Diagnosis    Pulmonary fibrosis    Essential hypertension       Dear Marisa Arias APRN:    Subjective     History of Present Illness:    Chief Complaint   Patient presents with    Follow-up     3 MONTH FOLLOW UP       Gregoria Pendleton is a pleasant 78 y.o. female with a past medical history significant for occipital CVA of unknown etiology in 2021. She also has pulmonary fibrosis now following with pulmonology. She herself denies any history of tobacco abuse or known exposures to pulmonary toxins. She does have diabetes mellitus, essential hypertension, and dyslipidemia. She comes in today for cardiology follow-up.     Gregoria comes in after having carotid ultrasound and 30-day Holter monitor due to her history of cryptogenic stroke.  Carotid artery ultrasound was unremarkable as well as Holter monitor showing normal sinus rhythm with no dysrhythmias seen.  Clinically from cardiac standpoint she is asymptomatic she denies any chest pains or worsening shortness of breath from baseline.  She does have some chronic dyspnea but again no worsening of this.  Blood pressure has been controlled.  She denies any syncope or near syncope.    Allergies   Allergen Reactions    Ampicillin Diarrhea   :      Current Outpatient Medications:     amlodipine-olmesartan (ARVIN) 5-40 MG per tablet, , Disp: , Rfl:     aspirin 81 MG chewable tablet, Chew 1 tablet Daily., Disp: , Rfl:     atenolol (TENORMIN) 25 MG tablet, Take 1 tablet by mouth Daily., Disp: , Rfl:     cetirizine (zyrTEC) 10 MG tablet, Take 1 tablet by mouth Daily., Disp: , Rfl:     cloNIDine (CATAPRES) 0.2 MG tablet, 1 tablet 2 (Two) Times a Day., Disp: , Rfl:     Emgality 120 MG/ML auto-injector pen, , Disp: , Rfl:     levothyroxine (SYNTHROID, LEVOTHROID) 100 MCG tablet, Take 75 mcg by mouth Daily., Disp: , Rfl:     Linzess 145 MCG capsule capsule, , Disp: , Rfl:      Mounjaro 10 MG/0.5ML solution pen-injector pen, , Disp: , Rfl:     omega-3 acid ethyl esters (LOVAZA) 1 g capsule, , Disp: , Rfl:     omeprazole (priLOSEC) 40 MG capsule, , Disp: , Rfl:     spironolactone (ALDACTONE) 50 MG tablet, Take 1 tablet by mouth 2 (Two) Times a Day., Disp: , Rfl:     SUMAtriptan (IMITREX) 100 MG tablet, , Disp: , Rfl:     ubrogepant (ubrogepant) 50 MG tablet, Take 1 tablet by mouth As Needed (At the onset of her headache.  May repeat once in 2 hours.  Do not take over 2 tabs a day or 8 tabs a month)., Disp: 8 tablet, Rfl: 3    bumetanide (BUMEX) 1 MG tablet, Take 1 tablet by mouth Daily. (Patient not taking: Reported on 4/17/2024), Disp: 30 tablet, Rfl: 2    cefdinir (OMNICEF) 300 MG capsule, Take 1 capsule by mouth Every 12 (Twelve) Hours., Disp: , Rfl:     DULoxetine (CYMBALTA) 60 MG capsule, Take 1 capsule by mouth Daily. (Patient not taking: Reported on 4/17/2024), Disp: , Rfl:     estradiol (ESTRACE) 1 MG tablet, Take 1 tablet by mouth Daily., Disp: , Rfl:     lovastatin (MEVACOR) 20 MG tablet, , Disp: , Rfl:     Nintedanib Esylate (Ofev) 150 MG capsule, Take 1 capsule twice daily (12 hours apart) with food., Disp: 60 capsule, Rfl: 3    rOPINIRole (REQUIP) 0.5 MG tablet, , Disp: , Rfl:     tiZANidine (ZANAFLEX) 4 MG tablet, Take 1 tablet by mouth At Night As Needed for Muscle Spasms. (Patient not taking: Reported on 4/17/2024), Disp: , Rfl:     The following portions of the patient's history were reviewed and updated as appropriate: allergies, current medications, past family history, past medical history, past social history, past surgical history and problem list.    Social History     Tobacco Use    Smoking status: Never     Passive exposure: Never    Smokeless tobacco: Never   Vaping Use    Vaping status: Never Used   Substance Use Topics    Alcohol use: Never    Drug use: Never         Objective   Vitals:    04/17/24 0840   BP: 138/79   BP Location: Left arm   Patient Position:  "Sitting   Cuff Size: Adult   Pulse: 81   SpO2: 90%   Weight: 73.5 kg (162 lb)   Height: 158.8 cm (62.52\")     Body mass index is 29.14 kg/m².    ROS    Constitutional:       General: Not in acute distress.     Appearance: Healthy appearance. Well-developed and not in distress. Not diaphoretic.   Eyes:      Conjunctiva/sclera: Conjunctivae normal.      Pupils: Pupils are equal, round, and reactive to light.   HENT:      Head: Normocephalic and atraumatic.   Neck:      Vascular: No carotid bruit or JVD.   Pulmonary:      Effort: Pulmonary effort is normal. No respiratory distress.      Breath sounds: Normal breath sounds.   Cardiovascular:      Normal rate. Regular rhythm.   Edema:     Peripheral edema absent.   Skin:     General: Skin is cool.   Neurological:      Mental Status: Alert, oriented to person, place, and time and oriented to person, place and time.         Lab Results   Component Value Date     10/23/2023    K 4.4 10/23/2023     10/23/2023    CO2 27.0 10/23/2023    BUN 26 (H) 10/23/2023    CREATININE 1.02 (H) 10/23/2023    GLUCOSE 113 (H) 10/23/2023    CALCIUM 10.2 10/23/2023    AST 20 10/23/2023    ALT 16 10/23/2023    ALKPHOS 83 10/23/2023     No results found for: \"CKTOTAL\"  Lab Results   Component Value Date    WBC 10.56 10/23/2023    HGB 15.3 10/23/2023    HCT 46.5 10/23/2023     10/23/2023     No results found for: \"INR\"  No results found for: \"MG\"  Lab Results   Component Value Date    TSH 1.410 02/12/2021      No results found for: \"BNP\"    During this visit the following were done:  Labs Reviewed []    Labs Ordered []    Radiology Reports Reviewed []    Radiology Ordered []    PCP Records Reviewed []    Referring Provider Records Reviewed []    ER Records Reviewed []    Hospital Records Reviewed []    History Obtained From Family []    Radiology Images Reviewed []    Other Reviewed []    Records Requested []       Procedures    Assessment & Plan    Diagnosis Plan   1. Essential " hypertension        2. Cryptogenic stroke  Ambulatory Referral to Neurology               Recommendations:  Cryptogenic stroke  Discussed results of carotid ultrasound and 30-day monitor.  No atrial fibrillation seen or significant carotid artery disease.  Discussed option of placement of loop recorder.  She is currently reluctant to proceed with this and would like to see a neurologist for their opinion.  Will refer to Dr. Jaquez in [facility].  Essential hypertension  Well-controlled.  Dyslipidemia  Will request recent labs from PCP    Return in about 3 months (around 7/17/2024).    As always, I appreciate very much the opportunity to participate in the cardiovascular care of your patients.      With Best Regards,    Ken Ordonez PA-C

## 2024-04-18 ENCOUNTER — OFFICE VISIT (OUTPATIENT)
Dept: PULMONOLOGY | Facility: CLINIC | Age: 79
End: 2024-04-18
Payer: MEDICARE

## 2024-04-18 VITALS
DIASTOLIC BLOOD PRESSURE: 80 MMHG | SYSTOLIC BLOOD PRESSURE: 122 MMHG | BODY MASS INDEX: 28.67 KG/M2 | WEIGHT: 161.8 LBS | HEIGHT: 63 IN | OXYGEN SATURATION: 87 % | HEART RATE: 94 BPM | TEMPERATURE: 97.9 F

## 2024-04-18 DIAGNOSIS — R09.02 HYPOXIA: ICD-10-CM

## 2024-04-18 DIAGNOSIS — J84.10 PULMONARY FIBROSIS: Primary | ICD-10-CM

## 2024-04-18 DIAGNOSIS — E66.3 OVERWEIGHT: ICD-10-CM

## 2024-04-18 RX ORDER — ONDANSETRON 4 MG/1
4 TABLET, FILM COATED ORAL EVERY 8 HOURS PRN
Qty: 30 TABLET | Refills: 4 | Status: SHIPPED | OUTPATIENT
Start: 2024-04-18

## 2024-04-18 RX ORDER — NEOMYCIN SULFATE, POLYMYXIN B SULFATE, AND DEXAMETHASONE 3.5; 10000; 1 MG/G; [USP'U]/G; MG/G
OINTMENT OPHTHALMIC
COMMUNITY
Start: 2024-04-04

## 2024-04-18 NOTE — PROGRESS NOTES
"Chief Complaint  Pulmonary fibrosis    Subjective        Gregoria Pendleton presents to [facility]MON[facility] MEDICINE  History of Present Illness    Ms. Pendleton is a 78 year old female with a  medical history significant for hypothyroidism, hypertension, Migraine, and stroke.    She presents today for follow up on pulmonary fibrosis.  She states that she has been taking OFEV but that she is throwing up with her first dose.  She states that she also has blisters in her mouth.  She states that her breathing is at baseline. She is complaint with supplemental oxygen at night and as needed.      Objective   Vital Signs:  /80   Pulse 94   Temp 97.9 °F (36.6 °C)   Ht 158.8 cm (62.5\")   Wt 73.4 kg (161 lb 12.8 oz)   SpO2 (!) 87% Comment: up to 93%  BMI 29.12 kg/m²   Estimated body mass index is 29.12 kg/m² as calculated from the following:    Height as of this encounter: 158.8 cm (62.5\").    Weight as of this encounter: 73.4 kg (161 lb 12.8 oz).               Physical Exam     GENERAL APPEARANCE: Well developed, well nourished, alert and cooperative, and appears to be in no acute distress.    HEAD: normocephalic. Atraumatic.    EYES: PERRL, EOMI. Vision is grossly intact.    THROAT: Oral cavity and pharynx normal. No inflammation, swelling, exudate, or lesions.     NECK: Neck supple.  No thyromegaly.    CARDIAC: Normal S1 and S2. No S3, S4 or murmurs. Rhythm is regular.     RESPIRATORY:Bilateral air entry positive. Bilateral diminished breath sounds. No wheezing, crackles or rhonchi noted.    GI: Positive bowel sounds. Soft, nondistended, nontender.     MUSCULOSKELETAL: No significant deformity or joint abnormality. No edema. Peripheral pulses intact. No varicosities.    NEUROLOGICAL: Strength and sensation symmetric and intact throughout.     PSYCHIATRIC: The mental examination revealed the patient was oriented to person, place, and time.     Result Review :    The following data " was reviewed by: SHAD Salgado on 04/18/2024:  Common labs          10/23/2023    11:10   Common Labs   Glucose 113    BUN 26    Creatinine 1.02    Sodium 141    Potassium 4.4    Chloride 102    Calcium 10.2    Albumin 4.6    Total Bilirubin 0.5    Alkaline Phosphatase 83    AST (SGOT) 20    ALT (SGPT) 16    WBC 10.56    Hemoglobin 15.3    Hematocrit 46.5    Platelets 398                   Assessment and Plan     Diagnoses and all orders for this visit:    1. Pulmonary fibrosis (Primary)    2. Hypoxia    3. Overweight    Other orders  -     ondansetron (Zofran) 4 MG tablet; Take 1 tablet by mouth Every 8 (Eight) Hours As Needed for Nausea or Vomiting.  Dispense: 30 tablet; Refill: 4  -     nystatin (MYCOSTATIN) 100,000 unit/mL suspension; Take 5 mL by mouth 4 (Four) Times a Day.  Dispense: 280 mL; Refill: 0            Chest imaging was dicussed with her in detail. NSIP pattern is noted.  She is noted to have advance pulmonary fibrosis.    We will plan to repeat in one year. (November 2024)    Continue OFEV for pulmonary fibrosis.  Reports vomiting with her first dose.  Encouraged her to eat with her medication.    She is compliant with supplemental oxygen at night and as needed with exertion.       Follow Up     Return in about 6 months (around 10/18/2024).  Patient was given instructions and counseling regarding her condition or for health maintenance advice. Please see specific information pulled into the AVS if appropriate.

## 2024-10-25 ENCOUNTER — OFFICE VISIT (OUTPATIENT)
Dept: CARDIOLOGY | Facility: CLINIC | Age: 79
End: 2024-10-25
Payer: MEDICARE

## 2024-10-25 VITALS
DIASTOLIC BLOOD PRESSURE: 61 MMHG | HEART RATE: 63 BPM | HEIGHT: 63 IN | WEIGHT: 161.2 LBS | BODY MASS INDEX: 28.56 KG/M2 | SYSTOLIC BLOOD PRESSURE: 121 MMHG | OXYGEN SATURATION: 95 %

## 2024-10-25 DIAGNOSIS — I63.9 CRYPTOGENIC STROKE: ICD-10-CM

## 2024-10-25 DIAGNOSIS — J84.10 PULMONARY FIBROSIS: ICD-10-CM

## 2024-10-25 DIAGNOSIS — I10 ESSENTIAL HYPERTENSION: Primary | ICD-10-CM

## 2024-10-25 NOTE — PROGRESS NOTES
Marisa Arias APRN  Gregoria Pendleton  1945  10/25/2024    Patient Active Problem List   Diagnosis    Pulmonary fibrosis    Essential hypertension    Cryptogenic stroke       Dear Marisa Arias APRN:    Subjective     Follow-up  :    Chief Complaint   Patient presents with    Follow-up     routine       Gregoria Pendleton is a pleasant 79 y.o. female with a past medical history significant for occipital CVA of unknown etiology in 2021. She also has pulmonary fibrosis now following with pulmonology. She herself denies any history of tobacco abuse or known exposures to pulmonary toxins. She does have diabetes mellitus, essential hypertension, and dyslipidemia. She comes in today for cardiology follow-up.     Gregoria reports that she has been stable. She denies any cardiac issues since she was last seen such at chest pains. She continues to have dyspnea and is now oxygen dependent 24/7. She is following with pulmonology with an unfortunately does appear that her idiopathic pulmonary fibrosis has worsened and she continues to follow with pulmonology.  She did see neurologist Dr. Jaquez.    Allergies   Allergen Reactions    Ampicillin Diarrhea    Clonidine Other (See Comments)     Patches cause chemical burns   :      Current Outpatient Medications:     amlodipine-olmesartan (ARVIN) 5-40 MG per tablet, , Disp: , Rfl:     aspirin 81 MG chewable tablet, Chew 1 tablet Daily., Disp: , Rfl:     atenolol (TENORMIN) 25 MG tablet, Take 1 tablet by mouth Daily., Disp: , Rfl:     cloNIDine (CATAPRES) 0.2 MG tablet, 1 tablet 2 (Two) Times a Day., Disp: , Rfl:     DULoxetine (CYMBALTA) 60 MG capsule, Take 1 capsule by mouth Daily., Disp: , Rfl:     Emgality 120 MG/ML auto-injector pen, , Disp: , Rfl:     estradiol (ESTRACE) 1 MG tablet, Take 1 tablet by mouth Daily., Disp: , Rfl:     levothyroxine (SYNTHROID, LEVOTHROID) 100 MCG tablet, Take 75 mcg by mouth Daily., Disp: , Rfl:     Linzess 145 MCG capsule capsule, , Disp: ,  Rfl:     lovastatin (MEVACOR) 20 MG tablet, , Disp: , Rfl:     Mounjaro 10 MG/0.5ML solution pen-injector pen, , Disp: , Rfl:     omega-3 acid ethyl esters (LOVAZA) 1 g capsule, , Disp: , Rfl:     omeprazole (priLOSEC) 40 MG capsule, , Disp: , Rfl:     ondansetron (Zofran) 4 MG tablet, Take 1 tablet by mouth Every 8 (Eight) Hours As Needed for Nausea or Vomiting., Disp: 30 tablet, Rfl: 4    rOPINIRole (REQUIP) 0.5 MG tablet, , Disp: , Rfl:     spironolactone (ALDACTONE) 50 MG tablet, Take 1 tablet by mouth 2 (Two) Times a Day., Disp: , Rfl:     tiZANidine (ZANAFLEX) 4 MG tablet, Take 1 tablet by mouth At Night As Needed for Muscle Spasms., Disp: , Rfl:     bumetanide (BUMEX) 1 MG tablet, Take 1 tablet by mouth Daily., Disp: 30 tablet, Rfl: 2    cetirizine (zyrTEC) 10 MG tablet, Take 1 tablet by mouth Daily., Disp: , Rfl:     neomycin-polymyxin-dexamethamethasone (POLYDEX) 3.5-24610-2.1 ointment ophthalmic ointment, APPLY ONE APPLICATION IN BOTH EYES AT BEDTIME FOR 10 DAYS (Patient not taking: Reported on 10/25/2024), Disp: , Rfl:     Nintedanib Esylate (Ofev) 150 MG capsule, Take 1 capsule twice daily (12 hours apart) with food. (Patient not taking: Reported on 10/25/2024), Disp: 60 capsule, Rfl: 3    nystatin (MYCOSTATIN) 100,000 unit/mL suspension, Take 5 mL by mouth 4 (Four) Times a Day. (Patient not taking: Reported on 10/25/2024), Disp: 280 mL, Rfl: 0    SUMAtriptan (IMITREX) 100 MG tablet, , Disp: , Rfl:     ubrogepant (ubrogepant) 50 MG tablet, Take 1 tablet by mouth As Needed (At the onset of her headache.  May repeat once in 2 hours.  Do not take over 2 tabs a day or 8 tabs a month). (Patient not taking: Reported on 10/25/2024), Disp: 8 tablet, Rfl: 3    The following portions of the patient's history were reviewed and updated as appropriate: allergies, current medications, past family history, past medical history, past social history, past surgical history and problem list.    Social History     Tobacco  "Use    Smoking status: Never     Passive exposure: Never    Smokeless tobacco: Never   Vaping Use    Vaping status: Never Used   Substance Use Topics    Alcohol use: Never    Drug use: Never         Objective   Vitals:    10/25/24 1044   BP: 121/61   Pulse: 63   SpO2: 95%   Weight: 73.1 kg (161 lb 3.2 oz)   Height: 158.8 cm (62.5\")     Body mass index is 29.01 kg/m².    ROS    Constitutional:       General: Not in acute distress.     Appearance: Healthy appearance. Well-developed and not in distress. Not diaphoretic.   Eyes:      Conjunctiva/sclera: Conjunctivae normal.      Pupils: Pupils are equal, round, and reactive to light.   HENT:      Head: Normocephalic and atraumatic.   Neck:      Vascular: No carotid bruit or JVD.   Pulmonary:      Effort: Pulmonary effort is normal. No respiratory distress.      Breath sounds: Normal breath sounds.   Cardiovascular:      Normal rate. Regular rhythm.   Edema:     Peripheral edema absent.   Skin:     General: Skin is cool.   Neurological:      Mental Status: Alert, oriented to person, place, and time and oriented to person, place and time.         Lab Results   Component Value Date     10/23/2023    K 4.4 10/23/2023     10/23/2023    CO2 27.0 10/23/2023    BUN 26 (H) 10/23/2023    CREATININE 1.02 (H) 10/23/2023    GLUCOSE 113 (H) 10/23/2023    CALCIUM 10.2 10/23/2023    AST 24 08/22/2024    ALT 15 08/22/2024    ALKPHOS 89 08/22/2024     No results found for: \"CKTOTAL\"  Lab Results   Component Value Date    WBC 10.87 (H) 04/26/2024    HGB 16.1 (H) 04/26/2024    HCT 49.0 (H) 04/26/2024     04/26/2024     No results found for: \"INR\"  No results found for: \"MG\"  Lab Results   Component Value Date    TSH 1.410 02/12/2021      No results found for: \"BNP\"    During this visit the following were done:  Labs Reviewed []    Labs Ordered []    Radiology Reports Reviewed []    Radiology Ordered []    PCP Records Reviewed []    Referring Provider Records Reviewed []  "   ER Records Reviewed []    Hospital Records Reviewed []    History Obtained From Family []    Radiology Images Reviewed []    Other Reviewed []    Records Requested []         ECG 12 Lead    Date/Time: 10/25/2024 10:45 AM  Performed by: Ken Ordonez PA-C    Authorized by: Ken Ordonez PA-C  Comparison: compared with previous ECG   Similar to previous ECG  Rhythm: sinus rhythm  Q waves: III and aVF      Clinical impression: non-specific ECG        Assessment & Plan    Diagnosis Plan   1. Essential hypertension  ECG 12 Lead      2. Cryptogenic stroke        3. Pulmonary fibrosis                 Recommendations:  Cryptogenic CVA  Still unclear etiology.  She did wear a 30-day event monitor which was unremarkable.  At last visit discussed with her about proceeding with loop recorder but declined at the time. She did complete 30 event monitor in 2024 that was negative for atrial fibrillation/flutter.   I discussed about proceeding with the recorder with Dr. Novak since it has been roughly 3 years since her stroke with no recurrence or TIAs we will hold off on loop placement at the moment.  Essential hypertension  Controlled, continue current regimen.     No follow-ups on file.    As always, I appreciate very much the opportunity to participate in the cardiovascular care of your patients.      With Best Regards,    Ken Ordonez PA-C

## 2024-11-08 ENCOUNTER — OFFICE VISIT (OUTPATIENT)
Dept: GASTROENTEROLOGY | Facility: CLINIC | Age: 79
End: 2024-11-08
Payer: MEDICARE

## 2024-11-08 VITALS
SYSTOLIC BLOOD PRESSURE: 115 MMHG | HEIGHT: 63 IN | HEART RATE: 81 BPM | DIASTOLIC BLOOD PRESSURE: 61 MMHG | WEIGHT: 161 LBS | BODY MASS INDEX: 28.53 KG/M2

## 2024-11-08 DIAGNOSIS — K59.04 CHRONIC IDIOPATHIC CONSTIPATION: ICD-10-CM

## 2024-11-08 DIAGNOSIS — R13.19 ESOPHAGEAL DYSPHAGIA: ICD-10-CM

## 2024-11-08 DIAGNOSIS — K21.9 GASTROESOPHAGEAL REFLUX DISEASE, UNSPECIFIED WHETHER ESOPHAGITIS PRESENT: Primary | ICD-10-CM

## 2024-11-08 DIAGNOSIS — Z86.73 HISTORY OF CVA (CEREBROVASCULAR ACCIDENT): ICD-10-CM

## 2024-11-08 RX ORDER — OMEPRAZOLE 40 MG/1
40 CAPSULE, DELAYED RELEASE ORAL 2 TIMES DAILY
Qty: 60 CAPSULE | Refills: 3 | Status: SHIPPED | OUTPATIENT
Start: 2024-11-08

## 2024-11-08 NOTE — PROGRESS NOTES
DATE OF CONSULTATION:  11/8/2024    REASON FOR REFERRAL:GERD    REFERRING PHYSICIAN:  Saul Tripp*    CHIEF COMPLAINT:  Chief Complaint   Patient presents with    Heartburn   Constipation     HISTORY OF PRESENT ILLNESS:   Gregoria Pendleton is a very pleasant 79 y.o. female who is being seen today at the request of Saul Tripp* for evaluation and treatment of GERD. Ms. Pendleton reports GERD has been a chronic issue.  Of note, she retains her gallbladder.  She reports she has been taking omeprazole 40 mg PO daily for several years.  At present, she reports having ~2-3 flares per week with burning in her throat/chest and regurgitation. She has associated nausea without vomiting. Denies epigastric pain.  She admits to drinking 2-3, 12 ounce bottles of Pepsi per day.  Her weight has been stable.She also reports chronic difficulty with dysphagia particularly with pills and liquids.  She reports pills will often get hung in her upper esophagus.  At times, she will also get choked on her saliva.  Of note, she has history of CVA in 2021.  She has chronic difficulty with constipation as well.  She has previously tried over-the-counter stool softeners/laxatives without improvement.  She was provided with Rx for Linzess 145 mcg p.o. daily, likely from her PCP which she says she takes as needed (every couple of weeks).  Patient reports she has not taking Linzess daily as prescribed due to fear of diarrhea with this medication.  At present, she reports her bowels move every 2 weeks with stool type V on [place] stool scale.  She has associated abdominal bloating and incomplete evacuation of her colon.  She denies having melena or rectal bleeding.  She reports having previous colonoscopy>20 years ago which was unremarkable.  She denies family history of colon cancer.  She has no other complaints today.    PAST MEDICAL HISTORY:  Past Medical History:   Diagnosis Date    Disease of thyroid gland     Hyperlipidemia      Hypertension     Migraine     Stroke        PAST SURGICAL HISTORY:  Past Surgical History:   Procedure Laterality Date    CYSTOSCOPY W/ LASER LITHOTRIPSY  01/15/2024    HYSTERECTOMY  age 38       FAMILY HISTORY:  Family History   Problem Relation Age of Onset    Heart failure Father     Heart disease Sister     Heart attack Sister     Breast cancer Neg Hx        SOCIAL HISTORY:  Social History     Socioeconomic History    Marital status:    Tobacco Use    Smoking status: Never     Passive exposure: Never    Smokeless tobacco: Never   Vaping Use    Vaping status: Never Used   Substance and Sexual Activity    Alcohol use: Never    Drug use: Never    Sexual activity: Defer     MEDICATIONS:  The current medication list was reviewed in the EMR    Current Outpatient Medications:     amlodipine-olmesartan (ARVIN) 5-40 MG per tablet, , Disp: , Rfl:     aspirin 81 MG chewable tablet, Chew 1 tablet Daily., Disp: , Rfl:     atenolol (TENORMIN) 25 MG tablet, Take 1 tablet by mouth Daily., Disp: , Rfl:     bumetanide (BUMEX) 1 MG tablet, Take 1 tablet by mouth Daily., Disp: 30 tablet, Rfl: 2    cetirizine (zyrTEC) 10 MG tablet, Take 1 tablet by mouth Daily., Disp: , Rfl:     cloNIDine (CATAPRES) 0.2 MG tablet, 1 tablet 2 (Two) Times a Day., Disp: , Rfl:     DULoxetine (CYMBALTA) 60 MG capsule, Take 1 capsule by mouth Daily., Disp: , Rfl:     Emgality 120 MG/ML auto-injector pen, , Disp: , Rfl:     estradiol (ESTRACE) 1 MG tablet, Take 1 tablet by mouth Daily., Disp: , Rfl:     levothyroxine (SYNTHROID, LEVOTHROID) 100 MCG tablet, Take 75 mcg by mouth Daily., Disp: , Rfl:     Linzess 145 MCG capsule capsule, , Disp: , Rfl:     lovastatin (MEVACOR) 20 MG tablet, , Disp: , Rfl:     Mounjaro 10 MG/0.5ML solution pen-injector pen, , Disp: , Rfl:     neomycin-polymyxin-dexamethamethasone (POLYDEX) 3.5-77793-1.1 ointment ophthalmic ointment, APPLY ONE APPLICATION IN BOTH EYES AT BEDTIME FOR 10 DAYS (Patient not taking:  "Reported on 10/25/2024), Disp: , Rfl:     Nintedanib Esylate (Ofev) 150 MG capsule, Take 1 capsule twice daily (12 hours apart) with food. (Patient not taking: Reported on 10/25/2024), Disp: 60 capsule, Rfl: 3    nystatin (MYCOSTATIN) 100,000 unit/mL suspension, Take 5 mL by mouth 4 (Four) Times a Day. (Patient not taking: Reported on 10/25/2024), Disp: 280 mL, Rfl: 0    omega-3 acid ethyl esters (LOVAZA) 1 g capsule, , Disp: , Rfl:     omeprazole (priLOSEC) 40 MG capsule, , Disp: , Rfl:     ondansetron (Zofran) 4 MG tablet, Take 1 tablet by mouth Every 8 (Eight) Hours As Needed for Nausea or Vomiting., Disp: 30 tablet, Rfl: 4    rOPINIRole (REQUIP) 0.5 MG tablet, , Disp: , Rfl:     spironolactone (ALDACTONE) 50 MG tablet, Take 1 tablet by mouth 2 (Two) Times a Day., Disp: , Rfl:     SUMAtriptan (IMITREX) 100 MG tablet, , Disp: , Rfl:     tiZANidine (ZANAFLEX) 4 MG tablet, Take 1 tablet by mouth At Night As Needed for Muscle Spasms., Disp: , Rfl:     ubrogepant (ubrogepant) 50 MG tablet, Take 1 tablet by mouth As Needed (At the onset of her headache.  May repeat once in 2 hours.  Do not take over 2 tabs a day or 8 tabs a month). (Patient not taking: Reported on 10/25/2024), Disp: 8 tablet, Rfl: 3    ALLERGIES:    Allergies   Allergen Reactions    Ampicillin Diarrhea    Clonidine Other (See Comments)     Patches cause chemical burns       REVIEW OF SYSTEMS:    A comprehensive 14 point review of systems was performed.  Significant findings as mentioned above.  All other systems reviewed and are negative.      Physical Exam   Vital Signs: /61 (BP Location: Left arm, Patient Position: Sitting, Cuff Size: Large Adult)   Pulse 81   Ht 158.8 cm (62.5\")   Wt 73 kg (161 lb)   BMI 28.98 kg/m²    General: Well developed, well nourished, alert and oriented x 3, in no acute distress.   Head: ATNC   Eyes: PERRL, No evidence of conjunctivitis.   Nose: No nasal discharge.   Mouth: Oral mucosal membranes moist. No oral " ulceration or hemorrhages.   Neck: Neck supple. No thyromegaly. No JVD.   Lungs: Clear in all fields to A&P without rales, rhonchi or wheezing.   Heart: Regular rate and rhythm. No murmurs, rubs, or gallops.   Abdomen: Soft. Bowel sounds are normoactive. Nontender with palpation.   Neurologic: Grossly non-focal exam    RECENT LABS:  Lab Results   Component Value Date    WBC 10.87 (H) 04/26/2024    HGB 16.1 (H) 04/26/2024    HCT 49.0 (H) 04/26/2024    MCV 95 04/26/2024    RDW 14.2 04/26/2024     04/26/2024    NEUTRORELPCT 65.0 04/26/2024    LYMPHORELPCT 27.0 04/26/2024    MONORELPCT 5.0 04/26/2024    EOSRELPCT 2.0 04/26/2024    BASORELPCT 1.0 04/26/2024    NEUTROABS 7.14 (H) 04/26/2024    LYMPHSABS 2.92 04/26/2024       Lab Results   Component Value Date     10/23/2023    K 4.4 10/23/2023    CO2 27.0 10/23/2023     10/23/2023    BUN 26 (H) 10/23/2023    CREATININE 1.02 (H) 10/23/2023    EGFRIFNONA 66 02/12/2021    GLUCOSE 113 (H) 10/23/2023    CALCIUM 10.2 10/23/2023    ALKPHOS 89 08/22/2024    AST 24 08/22/2024    ALT 15 08/22/2024    BILITOT 0.2 08/22/2024    ALBUMIN 4.7 08/22/2024    PROTEINTOT 7.4 08/22/2024     ASSESSMENT & PLAN:  Gregoria Pendleton is a very pleasant 79 y.o. female with    1.  GERD:  2.  Dysphagia (pills/liquids):    -Based on history above, will increase omeprazole to 40 mg p.o. twice daily.  Rx provided. We discussed important dietary modifications, limiting triggers such as caffeine, chocolate, spicy foods, acidic foods, fried/greasy foods, food with high fat content and carbonated beverages.  -Also recommended esophagram to further evaluate dysphagia with occasional choking.  Patient is agreeable.  Will schedule.  Based on findings, will discuss further management.  Patient may also need EGD versus modified barium swallow which we discussed today.  -Will follow-up in 3 months for symptom check.    3.  Constipation:  -This has been a chronic issue not improved with  over-the-counter stool softener/laxatives.  Encouraged patient to try Linzess 145 mcg p.o. daily which she already has at home.  If she is unable to tolerate this dose, could reduce to 72 mcg p.o. daily.    The patient was in agreement with the plan and all questions were answered to her satisfaction.     Thank you so much for allowing us to participate in the care of Gregoria Pendleton . Please do not hesitate to contact us with any questions or concerns.             Electronically Signed by: SHAD Carrillo , November 8, 2024 11:13 EST       CC:   Saul Tripp*  Marisa Arias APRN

## 2024-11-21 ENCOUNTER — HOSPITAL ENCOUNTER (OUTPATIENT)
Dept: GENERAL RADIOLOGY | Facility: HOSPITAL | Age: 79
Discharge: HOME | End: 2024-11-21
Admitting: NURSE PRACTITIONER
Payer: MEDICARE

## 2024-11-21 DIAGNOSIS — R13.19 ESOPHAGEAL DYSPHAGIA: ICD-10-CM

## 2024-11-21 DIAGNOSIS — Z86.73 HISTORY OF CVA (CEREBROVASCULAR ACCIDENT): ICD-10-CM

## 2024-11-21 DIAGNOSIS — K21.9 GASTROESOPHAGEAL REFLUX DISEASE, UNSPECIFIED WHETHER ESOPHAGITIS PRESENT: ICD-10-CM

## 2024-11-21 PROCEDURE — 74220 X-RAY XM ESOPHAGUS 1CNTRST: CPT

## 2024-11-21 PROCEDURE — 74220 X-RAY XM ESOPHAGUS 1CNTRST: CPT | Performed by: RADIOLOGY

## 2024-12-03 ENCOUNTER — TELEPHONE (OUTPATIENT)
Dept: GASTROENTEROLOGY | Facility: CLINIC | Age: 79
End: 2024-12-03
Payer: MEDICARE

## 2024-12-03 DIAGNOSIS — K21.9 GASTROESOPHAGEAL REFLUX DISEASE, UNSPECIFIED WHETHER ESOPHAGITIS PRESENT: ICD-10-CM

## 2024-12-03 DIAGNOSIS — R13.19 ESOPHAGEAL DYSPHAGIA: Primary | ICD-10-CM

## 2024-12-03 NOTE — TELEPHONE ENCOUNTER
I spoke to patient, notified her that we put her on a cancellation list and will call her to schedule EDG for Dec. Gave list to Denisa.

## 2024-12-11 ENCOUNTER — ANESTHESIA EVENT (OUTPATIENT)
Dept: PERIOP | Facility: HOSPITAL | Age: 79
End: 2024-12-11
Payer: MEDICARE

## 2024-12-12 ENCOUNTER — HOSPITAL ENCOUNTER (OUTPATIENT)
Facility: HOSPITAL | Age: 79
Setting detail: HOSPITAL OUTPATIENT SURGERY
Discharge: HOME | End: 2024-12-12
Attending: INTERNAL MEDICINE | Admitting: INTERNAL MEDICINE
Payer: MEDICARE

## 2024-12-12 ENCOUNTER — ANESTHESIA (OUTPATIENT)
Dept: PERIOP | Facility: HOSPITAL | Age: 79
End: 2024-12-12
Payer: MEDICARE

## 2024-12-12 VITALS
SYSTOLIC BLOOD PRESSURE: 102 MMHG | OXYGEN SATURATION: 98 % | WEIGHT: 160 LBS | HEIGHT: 62 IN | RESPIRATION RATE: 19 BRPM | TEMPERATURE: 97 F | DIASTOLIC BLOOD PRESSURE: 47 MMHG | BODY MASS INDEX: 29.44 KG/M2 | HEART RATE: 55 BPM

## 2024-12-12 DIAGNOSIS — R13.19 ESOPHAGEAL DYSPHAGIA: ICD-10-CM

## 2024-12-12 DIAGNOSIS — K21.9 GASTROESOPHAGEAL REFLUX DISEASE, UNSPECIFIED WHETHER ESOPHAGITIS PRESENT: ICD-10-CM

## 2024-12-12 PROCEDURE — 25010000002 PROPOFOL 200 MG/20ML EMULSION: Performed by: NURSE ANESTHETIST, CERTIFIED REGISTERED

## 2024-12-12 PROCEDURE — 43249 ESOPH EGD DILATION <30 MM: CPT | Performed by: INTERNAL MEDICINE

## 2024-12-12 PROCEDURE — C1726 CATH, BAL DIL, NON-VASCULAR: HCPCS | Performed by: INTERNAL MEDICINE

## 2024-12-12 RX ORDER — OXYCODONE AND ACETAMINOPHEN 5; 325 MG/1; MG/1
1 TABLET ORAL ONCE AS NEEDED
Status: DISCONTINUED | OUTPATIENT
Start: 2024-12-12 | End: 2024-12-12 | Stop reason: HOSPADM

## 2024-12-12 RX ORDER — FENTANYL CITRATE 50 UG/ML
50 INJECTION, SOLUTION INTRAMUSCULAR; INTRAVENOUS
Status: DISCONTINUED | OUTPATIENT
Start: 2024-12-12 | End: 2024-12-12 | Stop reason: HOSPADM

## 2024-12-12 RX ORDER — ONDANSETRON 2 MG/ML
4 INJECTION INTRAMUSCULAR; INTRAVENOUS AS NEEDED
Status: DISCONTINUED | OUTPATIENT
Start: 2024-12-12 | End: 2024-12-12 | Stop reason: HOSPADM

## 2024-12-12 RX ORDER — SODIUM CHLORIDE 0.9 % (FLUSH) 0.9 %
10 SYRINGE (ML) INJECTION AS NEEDED
Status: DISCONTINUED | OUTPATIENT
Start: 2024-12-12 | End: 2024-12-12 | Stop reason: HOSPADM

## 2024-12-12 RX ORDER — IPRATROPIUM BROMIDE AND ALBUTEROL SULFATE 2.5; .5 MG/3ML; MG/3ML
3 SOLUTION RESPIRATORY (INHALATION) ONCE AS NEEDED
Status: DISCONTINUED | OUTPATIENT
Start: 2024-12-12 | End: 2024-12-12 | Stop reason: HOSPADM

## 2024-12-12 RX ORDER — MEPERIDINE HYDROCHLORIDE 25 MG/ML
12.5 INJECTION INTRAMUSCULAR; INTRAVENOUS; SUBCUTANEOUS
Status: DISCONTINUED | OUTPATIENT
Start: 2024-12-12 | End: 2024-12-12 | Stop reason: HOSPADM

## 2024-12-12 RX ORDER — SODIUM CHLORIDE 0.9 % (FLUSH) 0.9 %
10 SYRINGE (ML) INJECTION EVERY 12 HOURS SCHEDULED
Status: DISCONTINUED | OUTPATIENT
Start: 2024-12-12 | End: 2024-12-12 | Stop reason: HOSPADM

## 2024-12-12 RX ORDER — SODIUM CHLORIDE, SODIUM LACTATE, POTASSIUM CHLORIDE, CALCIUM CHLORIDE 600; 310; 30; 20 MG/100ML; MG/100ML; MG/100ML; MG/100ML
125 INJECTION, SOLUTION INTRAVENOUS ONCE
Status: DISCONTINUED | OUTPATIENT
Start: 2024-12-12 | End: 2024-12-12 | Stop reason: HOSPADM

## 2024-12-12 RX ORDER — SODIUM CHLORIDE 9 MG/ML
40 INJECTION, SOLUTION INTRAVENOUS AS NEEDED
Status: DISCONTINUED | OUTPATIENT
Start: 2024-12-12 | End: 2024-12-12 | Stop reason: HOSPADM

## 2024-12-12 RX ORDER — MIDAZOLAM HYDROCHLORIDE 1 MG/ML
0.5 INJECTION, SOLUTION INTRAMUSCULAR; INTRAVENOUS
Status: DISCONTINUED | OUTPATIENT
Start: 2024-12-12 | End: 2024-12-12 | Stop reason: HOSPADM

## 2024-12-12 RX ORDER — PROPOFOL 10 MG/ML
INJECTION, EMULSION INTRAVENOUS AS NEEDED
Status: DISCONTINUED | OUTPATIENT
Start: 2024-12-12 | End: 2024-12-12 | Stop reason: SURG

## 2024-12-12 RX ADMIN — PROPOFOL 50 MG: 10 INJECTION, EMULSION INTRAVENOUS at 08:36

## 2024-12-12 RX ADMIN — PROPOFOL 200 MCG/KG/MIN: 10 INJECTION, EMULSION INTRAVENOUS at 08:37

## 2024-12-12 NOTE — ANESTHESIA POSTPROCEDURE EVALUATION
Patient: Gregoria Pendleton    Procedure Summary       Date: 12/12/24 Room / Location: [facility] OR [address] [place] [address] COR OR    Anesthesia Start: 0834 Anesthesia Stop: 0848    Procedure: ESOPHAGOGASTRODUODENOSCOPY WITH BIOPSY (Esophagus) Diagnosis:       Esophageal dysphagia      Gastroesophageal reflux disease, unspecified whether esophagitis present      (Esophageal dysphagia [R13.19])      (Gastroesophageal reflux disease, unspecified whether esophagitis present [K21.9])    Surgeons: Jaky Goddard MD Provider: Carl Comer DO    Anesthesia Type: general ASA Status: 4            Anesthesia Type: general    Vitals  Vitals Value Taken Time   /47 12/12/24 0920   Temp 97 °F (36.1 °C) 12/12/24 0850   Pulse 55 12/12/24 0920   Resp 19 12/12/24 0920   SpO2 98 % 12/12/24 0920           Post Anesthesia Care and Evaluation    Patient location during evaluation: PHASE II  Patient participation: complete - patient participated  Level of consciousness: awake and alert  Pain score: 1  Pain management: adequate    Airway patency: patent  Anesthetic complications: No anesthetic complications  PONV Status: controlled  Cardiovascular status: acceptable  Respiratory status: acceptable and room air  Hydration status: euvolemic  No anesthesia care post op

## 2024-12-12 NOTE — ANESTHESIA PREPROCEDURE EVALUATION
Anesthesia Evaluation     Patient summary reviewed and Nursing notes reviewed   no history of anesthetic complications:   NPO Solid Status: > 8 hours  NPO Liquid Status: > 8 hours           Airway   Mallampati: II  TM distance: >3 FB  Neck ROM: full  No difficulty expected  Dental    (+) edentulous    Pulmonary     breath sounds clear to auscultation  (+) COPD (pulmonary fibrosis),shortness of breath, decreased breath sounds  Cardiovascular - normal exam  Exercise tolerance: poor (<4 METS)    ECG reviewed  Patient on routine beta blocker and Beta blocker given within 24 hours of surgery  Rhythm: regular  Rate: normal    (+) hypertension, valvular problems/murmurs (mild [place]), GOLDSMITH, hyperlipidemia      Neuro/Psych  (+) CVA, headaches  GI/Hepatic/Renal/Endo    (+) obesity, GERD, thyroid problem hypothyroidism    Musculoskeletal     Abdominal   (+) obese   Substance History - negative use     OB/GYN negative ob/gyn ROS         Other   arthritis,     ROS/Med Hx Other: Echo 6/6/2024:  ·  Left Ventricle: The left ventricle is normal size. There is normal left   ventricular myocardial thickness and mass. The left ventricular systolic   function is normal. The LVEF is visually estimated at 55 - 60%. The left   ventricular filling pressure is elevated.   ·  Right Ventricle: The right ventricle is normal in size. The right   ventricular systolic function is normal.   ·  Aortic Valve: There is mild aortic valve regurgitation.   ·  Pericardium: No pericardial effusion.   ·  There is no recent study available for direct [id number] comparison.                   Anesthesia Plan    ASA 4     general   total IV anesthesia  intravenous induction     Anesthetic plan, risks, benefits, and alternatives have been provided, discussed and informed consent has been obtained with: patient.  Pre-procedure education provided  Plan discussed with CRNA.    CODE STATUS:

## 2024-12-13 LAB — REF LAB TEST METHOD: NORMAL

## 2024-12-17 NOTE — PROGRESS NOTES
At the time of your recent upper endoscopy, biopsies were taken of the esophagus.  Biopsies revealed mild chronic esophagitis from acid reflux.  Your esophagus was dilated with a 20 mm balloon.  Please continue taking omeprazole 40 mg twice daily.  Please keep your follow-up appointment.

## 2024-12-26 ENCOUNTER — TELEPHONE (OUTPATIENT)
Dept: GASTROENTEROLOGY | Facility: CLINIC | Age: 79
End: 2024-12-26

## 2024-12-26 NOTE — TELEPHONE ENCOUNTER
I spoke to patient, notified her that if  she is having trouble breathing  she needs to be evaluated at the ED. Patient stated she is on her way to the ED.

## 2024-12-26 NOTE — TELEPHONE ENCOUNTER
Hub staff attempted to follow warm transfer process and was unsuccessful     Caller: Gregoria Pendleton    Relationship to patient: Self    Best call back number: [phone]    Patient is needing: PT IS CALLING TO SPEAK WITH MAN OR MA. PT SAID SHE IS HAVING PAIN IN HER THROAT. SHE IS NEEDING TO SPEAK WITH SOMEONE ABOUT. PT HAVING SPASM AND HAVING A HARD TIME BREATHING. PLEASE GIVE PT A CALL BACK AND IF NOT ABLE TO REACH PT. IT IS OKAY TO [facility].

## 2025-01-01 ENCOUNTER — HOSPITAL ENCOUNTER (INPATIENT)
Facility: HOSPITAL | Age: 80
LOS: 6 days | End: 2025-03-04
Admitting: INTERNAL MEDICINE
Payer: MEDICARE

## 2025-01-01 ENCOUNTER — APPOINTMENT (OUTPATIENT)
Dept: GENERAL RADIOLOGY | Facility: HOSPITAL | Age: 80
End: 2025-01-01
Payer: MEDICARE

## 2025-01-01 ENCOUNTER — APPOINTMENT (OUTPATIENT)
Dept: CT IMAGING | Facility: HOSPITAL | Age: 80
End: 2025-01-01
Payer: MEDICARE

## 2025-01-01 VITALS
RESPIRATION RATE: 38 BRPM | HEIGHT: 62 IN | DIASTOLIC BLOOD PRESSURE: 73 MMHG | SYSTOLIC BLOOD PRESSURE: 177 MMHG | WEIGHT: 170.42 LBS | OXYGEN SATURATION: 40 % | BODY MASS INDEX: 31.36 KG/M2 | TEMPERATURE: 98.1 F

## 2025-01-01 DIAGNOSIS — J84.10 PULMONARY FIBROSIS: Primary | ICD-10-CM

## 2025-01-01 DIAGNOSIS — J69.0 ASPIRATION PNEUMONIA OF BOTH LUNGS, UNSPECIFIED ASPIRATION PNEUMONIA TYPE, UNSPECIFIED PART OF LUNG: ICD-10-CM

## 2025-01-01 LAB
A-A DO2: 561.3 MMHG (ref 0–300)
ALBUMIN SERPL-MCNC: 3.1 G/DL (ref 3.5–5.2)
ALBUMIN SERPL-MCNC: 3.3 G/DL (ref 3.5–5.2)
ALBUMIN SERPL-MCNC: 3.4 G/DL (ref 3.5–5.2)
ALBUMIN SERPL-MCNC: 3.9 G/DL (ref 3.5–5.2)
ALBUMIN/GLOB SERPL: 0.8 G/DL
ALBUMIN/GLOB SERPL: 1 G/DL
ALBUMIN/GLOB SERPL: 1.1 G/DL
ALBUMIN/GLOB SERPL: 1.3 G/DL
ALP SERPL-CCNC: 100 U/L (ref 39–117)
ALP SERPL-CCNC: 110 U/L (ref 39–117)
ALP SERPL-CCNC: 120 U/L (ref 39–117)
ALP SERPL-CCNC: 90 U/L (ref 39–117)
ALT SERPL W P-5'-P-CCNC: 16 U/L (ref 1–33)
ALT SERPL W P-5'-P-CCNC: 21 U/L (ref 1–33)
ALT SERPL W P-5'-P-CCNC: 23 U/L (ref 1–33)
ALT SERPL W P-5'-P-CCNC: 28 U/L (ref 1–33)
ANION GAP SERPL CALCULATED.3IONS-SCNC: 11.2 MMOL/L (ref 5–15)
ANION GAP SERPL CALCULATED.3IONS-SCNC: 12.5 MMOL/L (ref 5–15)
ANION GAP SERPL CALCULATED.3IONS-SCNC: 13.7 MMOL/L (ref 5–15)
ANION GAP SERPL CALCULATED.3IONS-SCNC: 15.8 MMOL/L (ref 5–15)
ANION GAP SERPL CALCULATED.3IONS-SCNC: 8.6 MMOL/L (ref 5–15)
ANION GAP SERPL CALCULATED.3IONS-SCNC: 9.1 MMOL/L (ref 5–15)
ANION GAP SERPL CALCULATED.3IONS-SCNC: 9.4 MMOL/L (ref 5–15)
ARTERIAL PATENCY WRIST A: ABNORMAL
AST SERPL-CCNC: 23 U/L (ref 1–32)
AST SERPL-CCNC: 32 U/L (ref 1–32)
AST SERPL-CCNC: 38 U/L (ref 1–32)
AST SERPL-CCNC: 54 U/L (ref 1–32)
ATMOSPHERIC PRESS: 726 MMHG
B PARAPERT DNA SPEC QL NAA+PROBE: NOT DETECTED
B PERT DNA SPEC QL NAA+PROBE: NOT DETECTED
BACTERIA SPEC AEROBE CULT: NORMAL
BACTERIA SPEC AEROBE CULT: NORMAL
BASE EXCESS BLDA CALC-SCNC: 3.6 MMOL/L (ref 0–2)
BASOPHILS # BLD AUTO: 0.01 10*3/MM3 (ref 0–0.2)
BASOPHILS # BLD AUTO: 0.02 10*3/MM3 (ref 0–0.2)
BASOPHILS # BLD AUTO: 0.04 10*3/MM3 (ref 0–0.2)
BASOPHILS NFR BLD AUTO: 0.1 % (ref 0–1.5)
BASOPHILS NFR BLD AUTO: 0.2 % (ref 0–1.5)
BASOPHILS NFR BLD AUTO: 0.2 % (ref 0–1.5)
BDY SITE: ABNORMAL
BILIRUB SERPL-MCNC: 0.2 MG/DL (ref 0–1.2)
BILIRUB SERPL-MCNC: 0.4 MG/DL (ref 0–1.2)
BILIRUB SERPL-MCNC: 0.5 MG/DL (ref 0–1.2)
BILIRUB SERPL-MCNC: 0.5 MG/DL (ref 0–1.2)
BILIRUB UR QL STRIP: NEGATIVE
BUN SERPL-MCNC: 11 MG/DL (ref 8–23)
BUN SERPL-MCNC: 13 MG/DL (ref 8–23)
BUN SERPL-MCNC: 40 MG/DL (ref 8–23)
BUN SERPL-MCNC: 45 MG/DL (ref 8–23)
BUN SERPL-MCNC: 50 MG/DL (ref 8–23)
BUN SERPL-MCNC: 51 MG/DL (ref 8–23)
BUN SERPL-MCNC: 56 MG/DL (ref 8–23)
BUN/CREAT SERPL: 13.9 (ref 7–25)
BUN/CREAT SERPL: 15.5 (ref 7–25)
BUN/CREAT SERPL: 31.7 (ref 7–25)
BUN/CREAT SERPL: 47.1 (ref 7–25)
BUN/CREAT SERPL: 52.6 (ref 7–25)
BUN/CREAT SERPL: 54.2 (ref 7–25)
BUN/CREAT SERPL: 55.4 (ref 7–25)
C PNEUM DNA NPH QL NAA+NON-PROBE: NOT DETECTED
CALCIUM SPEC-SCNC: 8.9 MG/DL (ref 8.6–10.5)
CALCIUM SPEC-SCNC: 9 MG/DL (ref 8.6–10.5)
CALCIUM SPEC-SCNC: 9.1 MG/DL (ref 8.6–10.5)
CALCIUM SPEC-SCNC: 9.1 MG/DL (ref 8.6–10.5)
CALCIUM SPEC-SCNC: 9.2 MG/DL (ref 8.6–10.5)
CALCIUM SPEC-SCNC: 9.3 MG/DL (ref 8.6–10.5)
CALCIUM SPEC-SCNC: 9.3 MG/DL (ref 8.6–10.5)
CHLORIDE SERPL-SCNC: 102 MMOL/L (ref 98–107)
CHLORIDE SERPL-SCNC: 102 MMOL/L (ref 98–107)
CHLORIDE SERPL-SCNC: 105 MMOL/L (ref 98–107)
CHLORIDE SERPL-SCNC: 105 MMOL/L (ref 98–107)
CHLORIDE SERPL-SCNC: 106 MMOL/L (ref 98–107)
CHLORIDE SERPL-SCNC: 109 MMOL/L (ref 98–107)
CHLORIDE SERPL-SCNC: 110 MMOL/L (ref 98–107)
CLARITY UR: CLEAR
CO2 BLDA-SCNC: 29.1 MMOL/L (ref 22–33)
CO2 SERPL-SCNC: 23.2 MMOL/L (ref 22–29)
CO2 SERPL-SCNC: 25.3 MMOL/L (ref 22–29)
CO2 SERPL-SCNC: 25.8 MMOL/L (ref 22–29)
CO2 SERPL-SCNC: 26.5 MMOL/L (ref 22–29)
CO2 SERPL-SCNC: 26.6 MMOL/L (ref 22–29)
CO2 SERPL-SCNC: 28.4 MMOL/L (ref 22–29)
CO2 SERPL-SCNC: 30.9 MMOL/L (ref 22–29)
COHGB MFR BLD: 1.7 % (ref 0–5)
COLOR UR: YELLOW
CREAT SERPL-MCNC: 0.79 MG/DL (ref 0.57–1)
CREAT SERPL-MCNC: 0.83 MG/DL (ref 0.57–1)
CREAT SERPL-MCNC: 0.84 MG/DL (ref 0.57–1)
CREAT SERPL-MCNC: 0.92 MG/DL (ref 0.57–1)
CREAT SERPL-MCNC: 0.95 MG/DL (ref 0.57–1)
CREAT SERPL-MCNC: 1.19 MG/DL (ref 0.57–1)
CREAT SERPL-MCNC: 1.26 MG/DL (ref 0.57–1)
D DIMER PPP FEU-MCNC: 1.73 MCGFEU/ML (ref 0–0.77)
D-LACTATE SERPL-SCNC: 1 MMOL/L (ref 0.5–2)
D-LACTATE SERPL-SCNC: 3.4 MMOL/L (ref 0.5–2)
DEPRECATED RDW RBC AUTO: 47.1 FL (ref 37–54)
DEPRECATED RDW RBC AUTO: 47.5 FL (ref 37–54)
DEPRECATED RDW RBC AUTO: 47.8 FL (ref 37–54)
DEPRECATED RDW RBC AUTO: 48.1 FL (ref 37–54)
DEPRECATED RDW RBC AUTO: 48.8 FL (ref 37–54)
DEPRECATED RDW RBC AUTO: 49.7 FL (ref 37–54)
DEPRECATED RDW RBC AUTO: 49.9 FL (ref 37–54)
EGFRCR SERPLBLD CKD-EPI 2021: 44.1 ML/MIN/1.73
EGFRCR SERPLBLD CKD-EPI 2021: 47.2 ML/MIN/1.73
EGFRCR SERPLBLD CKD-EPI 2021: 61.8 ML/MIN/1.73
EGFRCR SERPLBLD CKD-EPI 2021: 64.3 ML/MIN/1.73
EGFRCR SERPLBLD CKD-EPI 2021: 71.7 ML/MIN/1.73
EGFRCR SERPLBLD CKD-EPI 2021: 72.7 ML/MIN/1.73
EGFRCR SERPLBLD CKD-EPI 2021: 77.2 ML/MIN/1.73
EOSINOPHIL # BLD AUTO: 0 10*3/MM3 (ref 0–0.4)
EOSINOPHIL # BLD AUTO: 0 10*3/MM3 (ref 0–0.4)
EOSINOPHIL # BLD AUTO: 0.01 10*3/MM3 (ref 0–0.4)
EOSINOPHIL # BLD AUTO: 0.04 10*3/MM3 (ref 0–0.4)
EOSINOPHIL # BLD AUTO: 0.05 10*3/MM3 (ref 0–0.4)
EOSINOPHIL # BLD MANUAL: 0.21 10*3/MM3 (ref 0–0.4)
EOSINOPHIL NFR BLD AUTO: 0 % (ref 0.3–6.2)
EOSINOPHIL NFR BLD AUTO: 0 % (ref 0.3–6.2)
EOSINOPHIL NFR BLD AUTO: 0.1 % (ref 0.3–6.2)
EOSINOPHIL NFR BLD AUTO: 0.2 % (ref 0.3–6.2)
EOSINOPHIL NFR BLD AUTO: 0.3 % (ref 0.3–6.2)
EOSINOPHIL NFR BLD MANUAL: 1 % (ref 0.3–6.2)
ERYTHROCYTE [DISTWIDTH] IN BLOOD BY AUTOMATED COUNT: 13.9 % (ref 12.3–15.4)
ERYTHROCYTE [DISTWIDTH] IN BLOOD BY AUTOMATED COUNT: 14 % (ref 12.3–15.4)
ERYTHROCYTE [DISTWIDTH] IN BLOOD BY AUTOMATED COUNT: 14.4 % (ref 12.3–15.4)
ERYTHROCYTE [DISTWIDTH] IN BLOOD BY AUTOMATED COUNT: 14.4 % (ref 12.3–15.4)
ERYTHROCYTE [DISTWIDTH] IN BLOOD BY AUTOMATED COUNT: 14.6 % (ref 12.3–15.4)
ERYTHROCYTE [DISTWIDTH] IN BLOOD BY AUTOMATED COUNT: 14.6 % (ref 12.3–15.4)
ERYTHROCYTE [DISTWIDTH] IN BLOOD BY AUTOMATED COUNT: 14.8 % (ref 12.3–15.4)
FLUAV SUBTYP SPEC NAA+PROBE: NOT DETECTED
FLUBV RNA ISLT QL NAA+PROBE: NOT DETECTED
GEN 5 1HR TROPONIN T REFLEX: 33 NG/L
GLOBULIN UR ELPH-MCNC: 2.7 GM/DL
GLOBULIN UR ELPH-MCNC: 3.1 GM/DL
GLOBULIN UR ELPH-MCNC: 3.6 GM/DL
GLOBULIN UR ELPH-MCNC: 4.1 GM/DL
GLUCOSE BLDC GLUCOMTR-MCNC: 127 MG/DL (ref 70–130)
GLUCOSE BLDC GLUCOMTR-MCNC: 141 MG/DL (ref 70–130)
GLUCOSE BLDC GLUCOMTR-MCNC: 142 MG/DL (ref 70–130)
GLUCOSE BLDC GLUCOMTR-MCNC: 145 MG/DL (ref 70–130)
GLUCOSE BLDC GLUCOMTR-MCNC: 145 MG/DL (ref 70–130)
GLUCOSE BLDC GLUCOMTR-MCNC: 151 MG/DL (ref 70–130)
GLUCOSE BLDC GLUCOMTR-MCNC: 153 MG/DL (ref 70–130)
GLUCOSE BLDC GLUCOMTR-MCNC: 158 MG/DL (ref 70–130)
GLUCOSE BLDC GLUCOMTR-MCNC: 158 MG/DL (ref 70–130)
GLUCOSE BLDC GLUCOMTR-MCNC: 163 MG/DL (ref 70–130)
GLUCOSE BLDC GLUCOMTR-MCNC: 167 MG/DL (ref 70–130)
GLUCOSE BLDC GLUCOMTR-MCNC: 169 MG/DL (ref 70–130)
GLUCOSE BLDC GLUCOMTR-MCNC: 172 MG/DL (ref 70–130)
GLUCOSE BLDC GLUCOMTR-MCNC: 173 MG/DL (ref 70–130)
GLUCOSE BLDC GLUCOMTR-MCNC: 176 MG/DL (ref 70–130)
GLUCOSE BLDC GLUCOMTR-MCNC: 176 MG/DL (ref 70–130)
GLUCOSE BLDC GLUCOMTR-MCNC: 178 MG/DL (ref 70–130)
GLUCOSE BLDC GLUCOMTR-MCNC: 182 MG/DL (ref 70–130)
GLUCOSE BLDC GLUCOMTR-MCNC: 192 MG/DL (ref 70–130)
GLUCOSE BLDC GLUCOMTR-MCNC: 193 MG/DL (ref 70–130)
GLUCOSE BLDC GLUCOMTR-MCNC: 203 MG/DL (ref 70–130)
GLUCOSE BLDC GLUCOMTR-MCNC: 206 MG/DL (ref 70–130)
GLUCOSE BLDC GLUCOMTR-MCNC: 214 MG/DL (ref 70–130)
GLUCOSE BLDC GLUCOMTR-MCNC: 224 MG/DL (ref 70–130)
GLUCOSE BLDC GLUCOMTR-MCNC: 241 MG/DL (ref 70–130)
GLUCOSE SERPL-MCNC: 159 MG/DL (ref 65–99)
GLUCOSE SERPL-MCNC: 169 MG/DL (ref 65–99)
GLUCOSE SERPL-MCNC: 179 MG/DL (ref 65–99)
GLUCOSE SERPL-MCNC: 182 MG/DL (ref 65–99)
GLUCOSE SERPL-MCNC: 183 MG/DL (ref 65–99)
GLUCOSE SERPL-MCNC: 184 MG/DL (ref 65–99)
GLUCOSE SERPL-MCNC: 248 MG/DL (ref 65–99)
GLUCOSE UR STRIP-MCNC: NEGATIVE MG/DL
HADV DNA SPEC NAA+PROBE: NOT DETECTED
HBA1C MFR BLD: 6.1 % (ref 4.8–5.6)
HCO3 BLDA-SCNC: 27.9 MMOL/L (ref 20–26)
HCOV 229E RNA SPEC QL NAA+PROBE: NOT DETECTED
HCOV HKU1 RNA SPEC QL NAA+PROBE: NOT DETECTED
HCOV NL63 RNA SPEC QL NAA+PROBE: NOT DETECTED
HCOV OC43 RNA SPEC QL NAA+PROBE: NOT DETECTED
HCT VFR BLD AUTO: 33.4 % (ref 34–46.6)
HCT VFR BLD AUTO: 34.4 % (ref 34–46.6)
HCT VFR BLD AUTO: 35.6 % (ref 34–46.6)
HCT VFR BLD AUTO: 36.2 % (ref 34–46.6)
HCT VFR BLD AUTO: 36.5 % (ref 34–46.6)
HCT VFR BLD AUTO: 38.7 % (ref 34–46.6)
HCT VFR BLD AUTO: 40.9 % (ref 34–46.6)
HCT VFR BLD CALC: 39.1 % (ref 38–51)
HGB BLD-MCNC: 10.4 G/DL (ref 12–15.9)
HGB BLD-MCNC: 11 G/DL (ref 12–15.9)
HGB BLD-MCNC: 11.2 G/DL (ref 12–15.9)
HGB BLD-MCNC: 11.2 G/DL (ref 12–15.9)
HGB BLD-MCNC: 11.5 G/DL (ref 12–15.9)
HGB BLD-MCNC: 12.6 G/DL (ref 12–15.9)
HGB BLD-MCNC: 13.3 G/DL (ref 12–15.9)
HGB BLDA-MCNC: 12.8 G/DL (ref 13.5–17.5)
HGB UR QL STRIP.AUTO: NEGATIVE
HMPV RNA NPH QL NAA+NON-PROBE: NOT DETECTED
HOLD SPECIMEN: NORMAL
HOLD SPECIMEN: NORMAL
HPIV1 RNA ISLT QL NAA+PROBE: NOT DETECTED
HPIV2 RNA SPEC QL NAA+PROBE: NOT DETECTED
HPIV3 RNA NPH QL NAA+PROBE: NOT DETECTED
HPIV4 P GENE NPH QL NAA+PROBE: NOT DETECTED
IMM GRANULOCYTES # BLD AUTO: 0.06 10*3/MM3 (ref 0–0.05)
IMM GRANULOCYTES # BLD AUTO: 0.08 10*3/MM3 (ref 0–0.05)
IMM GRANULOCYTES # BLD AUTO: 0.09 10*3/MM3 (ref 0–0.05)
IMM GRANULOCYTES # BLD AUTO: 0.1 10*3/MM3 (ref 0–0.05)
IMM GRANULOCYTES # BLD AUTO: 0.19 10*3/MM3 (ref 0–0.05)
IMM GRANULOCYTES NFR BLD AUTO: 0.5 % (ref 0–0.5)
IMM GRANULOCYTES NFR BLD AUTO: 0.5 % (ref 0–0.5)
IMM GRANULOCYTES NFR BLD AUTO: 0.6 % (ref 0–0.5)
IMM GRANULOCYTES NFR BLD AUTO: 0.7 % (ref 0–0.5)
IMM GRANULOCYTES NFR BLD AUTO: 1.4 % (ref 0–0.5)
INHALED O2 CONCENTRATION: 100 %
INR PPP: 1.19 (ref 0.9–1.1)
KETONES UR QL STRIP: ABNORMAL
LEUKOCYTE ESTERASE UR QL STRIP.AUTO: NEGATIVE
LYMPHOCYTES # BLD AUTO: 0.91 10*3/MM3 (ref 0.7–3.1)
LYMPHOCYTES # BLD AUTO: 1 10*3/MM3 (ref 0.7–3.1)
LYMPHOCYTES # BLD AUTO: 1.12 10*3/MM3 (ref 0.7–3.1)
LYMPHOCYTES # BLD AUTO: 1.26 10*3/MM3 (ref 0.7–3.1)
LYMPHOCYTES # BLD AUTO: 1.44 10*3/MM3 (ref 0.7–3.1)
LYMPHOCYTES # BLD MANUAL: 2.57 10*3/MM3 (ref 0.7–3.1)
LYMPHOCYTES NFR BLD AUTO: 6.9 % (ref 19.6–45.3)
LYMPHOCYTES NFR BLD AUTO: 7.3 % (ref 19.6–45.3)
LYMPHOCYTES NFR BLD AUTO: 8.6 % (ref 19.6–45.3)
LYMPHOCYTES NFR BLD AUTO: 8.8 % (ref 19.6–45.3)
LYMPHOCYTES NFR BLD AUTO: 9.7 % (ref 19.6–45.3)
LYMPHOCYTES NFR BLD MANUAL: 7 % (ref 5–12)
Lab: ABNORMAL
M PNEUMO IGG SER IA-ACNC: NOT DETECTED
MAGNESIUM SERPL-MCNC: 2.1 MG/DL (ref 1.6–2.4)
MAGNESIUM SERPL-MCNC: 2.3 MG/DL (ref 1.6–2.4)
MAGNESIUM SERPL-MCNC: 2.6 MG/DL (ref 1.6–2.4)
MCH RBC QN AUTO: 28.8 PG (ref 26.6–33)
MCH RBC QN AUTO: 29 PG (ref 26.6–33)
MCH RBC QN AUTO: 29 PG (ref 26.6–33)
MCH RBC QN AUTO: 29.1 PG (ref 26.6–33)
MCH RBC QN AUTO: 29.2 PG (ref 26.6–33)
MCH RBC QN AUTO: 29.5 PG (ref 26.6–33)
MCH RBC QN AUTO: 29.6 PG (ref 26.6–33)
MCHC RBC AUTO-ENTMCNC: 30.9 G/DL (ref 31.5–35.7)
MCHC RBC AUTO-ENTMCNC: 31.1 G/DL (ref 31.5–35.7)
MCHC RBC AUTO-ENTMCNC: 31.5 G/DL (ref 31.5–35.7)
MCHC RBC AUTO-ENTMCNC: 31.5 G/DL (ref 31.5–35.7)
MCHC RBC AUTO-ENTMCNC: 32 G/DL (ref 31.5–35.7)
MCHC RBC AUTO-ENTMCNC: 32.5 G/DL (ref 31.5–35.7)
MCHC RBC AUTO-ENTMCNC: 32.6 G/DL (ref 31.5–35.7)
MCV RBC AUTO: 89.6 FL (ref 79–97)
MCV RBC AUTO: 91.1 FL (ref 79–97)
MCV RBC AUTO: 91.5 FL (ref 79–97)
MCV RBC AUTO: 92.2 FL (ref 79–97)
MCV RBC AUTO: 92.2 FL (ref 79–97)
MCV RBC AUTO: 93 FL (ref 79–97)
MCV RBC AUTO: 94 FL (ref 79–97)
METHGB BLD QL: 0.2 % (ref 0–3)
MODALITY: ABNORMAL
MONOCYTES # BLD AUTO: 0.18 10*3/MM3 (ref 0.1–0.9)
MONOCYTES # BLD AUTO: 0.31 10*3/MM3 (ref 0.1–0.9)
MONOCYTES # BLD AUTO: 0.44 10*3/MM3 (ref 0.1–0.9)
MONOCYTES # BLD AUTO: 0.48 10*3/MM3 (ref 0.1–0.9)
MONOCYTES # BLD AUTO: 0.53 10*3/MM3 (ref 0.1–0.9)
MONOCYTES # BLD: 1.5 10*3/MM3 (ref 0.1–0.9)
MONOCYTES NFR BLD AUTO: 1 % (ref 5–12)
MONOCYTES NFR BLD AUTO: 2.7 % (ref 5–12)
MONOCYTES NFR BLD AUTO: 3.2 % (ref 5–12)
MONOCYTES NFR BLD AUTO: 3.4 % (ref 5–12)
MONOCYTES NFR BLD AUTO: 4.1 % (ref 5–12)
MRSA DNA SPEC QL NAA+PROBE: NORMAL
NEUTROPHILS # BLD AUTO: 17.14 10*3/MM3 (ref 1.7–7)
NEUTROPHILS NFR BLD AUTO: 10.03 10*3/MM3 (ref 1.7–7)
NEUTROPHILS NFR BLD AUTO: 10.09 10*3/MM3 (ref 1.7–7)
NEUTROPHILS NFR BLD AUTO: 11.55 10*3/MM3 (ref 1.7–7)
NEUTROPHILS NFR BLD AUTO: 14.26 10*3/MM3 (ref 1.7–7)
NEUTROPHILS NFR BLD AUTO: 15.72 10*3/MM3 (ref 1.7–7)
NEUTROPHILS NFR BLD AUTO: 86.5 % (ref 42.7–76)
NEUTROPHILS NFR BLD AUTO: 86.9 % (ref 42.7–76)
NEUTROPHILS NFR BLD AUTO: 87.2 % (ref 42.7–76)
NEUTROPHILS NFR BLD AUTO: 88.1 % (ref 42.7–76)
NEUTROPHILS NFR BLD AUTO: 90.6 % (ref 42.7–76)
NEUTROPHILS NFR BLD MANUAL: 71 % (ref 42.7–76)
NEUTS BAND NFR BLD MANUAL: 9 % (ref 0–5)
NITRITE UR QL STRIP: NEGATIVE
NRBC BLD AUTO-RTO: 0 /100 WBC (ref 0–0.2)
NRBC BLD AUTO-RTO: 0.2 /100 WBC (ref 0–0.2)
NT-PROBNP SERPL-MCNC: 3228 PG/ML (ref 0–1800)
OXYHGB MFR BLDV: 94.4 % (ref 94–99)
PCO2 BLDA: 40.2 MM HG (ref 35–45)
PCO2 TEMP ADJ BLD: ABNORMAL MM[HG]
PH BLDA: 7.45 PH UNITS (ref 7.35–7.45)
PH UR STRIP.AUTO: 7 [PH] (ref 5–8)
PH, TEMP CORRECTED: ABNORMAL
PLAT MORPH BLD: NORMAL
PLATELET # BLD AUTO: 298 10*3/MM3 (ref 140–450)
PLATELET # BLD AUTO: 314 10*3/MM3 (ref 140–450)
PLATELET # BLD AUTO: 320 10*3/MM3 (ref 140–450)
PLATELET # BLD AUTO: 322 10*3/MM3 (ref 140–450)
PLATELET # BLD AUTO: 328 10*3/MM3 (ref 140–450)
PLATELET # BLD AUTO: 328 10*3/MM3 (ref 140–450)
PLATELET # BLD AUTO: 346 10*3/MM3 (ref 140–450)
PMV BLD AUTO: 10.1 FL (ref 6–12)
PMV BLD AUTO: 10.2 FL (ref 6–12)
PMV BLD AUTO: 10.3 FL (ref 6–12)
PMV BLD AUTO: 10.4 FL (ref 6–12)
PMV BLD AUTO: 10.5 FL (ref 6–12)
PMV BLD AUTO: 10.6 FL (ref 6–12)
PMV BLD AUTO: 9.8 FL (ref 6–12)
PO2 BLDA: 77.1 MM HG (ref 83–108)
PO2 TEMP ADJ BLD: ABNORMAL MM[HG]
POTASSIUM SERPL-SCNC: 3.3 MMOL/L (ref 3.5–5.2)
POTASSIUM SERPL-SCNC: 3.3 MMOL/L (ref 3.5–5.2)
POTASSIUM SERPL-SCNC: 3.8 MMOL/L (ref 3.5–5.2)
POTASSIUM SERPL-SCNC: 3.9 MMOL/L (ref 3.5–5.2)
POTASSIUM SERPL-SCNC: 3.9 MMOL/L (ref 3.5–5.2)
POTASSIUM SERPL-SCNC: 4 MMOL/L (ref 3.5–5.2)
POTASSIUM SERPL-SCNC: 4.2 MMOL/L (ref 3.5–5.2)
PROCALCITONIN SERPL-MCNC: 0.09 NG/ML (ref 0–0.25)
PROCALCITONIN SERPL-MCNC: 0.19 NG/ML (ref 0–0.25)
PROT SERPL-MCNC: 6.1 G/DL (ref 6–8.5)
PROT SERPL-MCNC: 6.2 G/DL (ref 6–8.5)
PROT SERPL-MCNC: 7.4 G/DL (ref 6–8.5)
PROT SERPL-MCNC: 7.5 G/DL (ref 6–8.5)
PROT UR QL STRIP: ABNORMAL
PROTHROMBIN TIME: 15.2 SECONDS (ref 11.6–15.1)
QT INTERVAL: 360 MS
QT INTERVAL: 502 MS
QTC INTERVAL: 438 MS
QTC INTERVAL: 476 MS
RBC # BLD AUTO: 3.59 10*6/MM3 (ref 3.77–5.28)
RBC # BLD AUTO: 3.73 10*6/MM3 (ref 3.77–5.28)
RBC # BLD AUTO: 3.85 10*6/MM3 (ref 3.77–5.28)
RBC # BLD AUTO: 3.89 10*6/MM3 (ref 3.77–5.28)
RBC # BLD AUTO: 3.96 10*6/MM3 (ref 3.77–5.28)
RBC # BLD AUTO: 4.32 10*6/MM3 (ref 3.77–5.28)
RBC # BLD AUTO: 4.49 10*6/MM3 (ref 3.77–5.28)
RBC MORPH BLD: NORMAL
RHINOVIRUS RNA SPEC NAA+PROBE: NOT DETECTED
RSV RNA NPH QL NAA+NON-PROBE: NOT DETECTED
SAO2 % BLDCOA: 96.2 % (ref 94–99)
SARS-COV-2 RNA RESP QL NAA+PROBE: NOT DETECTED
SCAN SLIDE: NORMAL
SODIUM SERPL-SCNC: 141 MMOL/L (ref 136–145)
SODIUM SERPL-SCNC: 141 MMOL/L (ref 136–145)
SODIUM SERPL-SCNC: 142 MMOL/L (ref 136–145)
SODIUM SERPL-SCNC: 143 MMOL/L (ref 136–145)
SODIUM SERPL-SCNC: 144 MMOL/L (ref 136–145)
SODIUM SERPL-SCNC: 145 MMOL/L (ref 136–145)
SODIUM SERPL-SCNC: 150 MMOL/L (ref 136–145)
SP GR UR STRIP: 1.02 (ref 1–1.03)
TROPONIN T % DELTA: 18
TROPONIN T NUMERIC DELTA: 5 NG/L
TROPONIN T SERPL HS-MCNC: 28 NG/L
UROBILINOGEN UR QL STRIP: ABNORMAL
VARIANT LYMPHS NFR BLD MANUAL: 12 % (ref 19.6–45.3)
VENTILATOR MODE: ABNORMAL
WBC NRBC COR # BLD AUTO: 11.6 10*3/MM3 (ref 3.4–10.8)
WBC NRBC COR # BLD AUTO: 11.6 10*3/MM3 (ref 3.4–10.8)
WBC NRBC COR # BLD AUTO: 13.11 10*3/MM3 (ref 3.4–10.8)
WBC NRBC COR # BLD AUTO: 16.37 10*3/MM3 (ref 3.4–10.8)
WBC NRBC COR # BLD AUTO: 17.35 10*3/MM3 (ref 3.4–10.8)
WBC NRBC COR # BLD AUTO: 17.4 10*3/MM3 (ref 3.4–10.8)
WBC NRBC COR # BLD AUTO: 21.42 10*3/MM3 (ref 3.4–10.8)
WHOLE BLOOD HOLD COAG: NORMAL
WHOLE BLOOD HOLD SPECIMEN: NORMAL

## 2025-01-01 PROCEDURE — 99232 SBSQ HOSP IP/OBS MODERATE 35: CPT | Performed by: INTERNAL MEDICINE

## 2025-01-01 PROCEDURE — 25810000003 SODIUM CHLORIDE 0.9 % SOLUTION 250 ML FLEX CONT: Performed by: INTERNAL MEDICINE

## 2025-01-01 PROCEDURE — 71045 X-RAY EXAM CHEST 1 VIEW: CPT

## 2025-01-01 PROCEDURE — 25010000002 METHYLPREDNISOLONE PER 125 MG: Performed by: INTERNAL MEDICINE

## 2025-01-01 PROCEDURE — 94799 UNLISTED PULMONARY SVC/PX: CPT

## 2025-01-01 PROCEDURE — 83605 ASSAY OF LACTIC ACID: CPT

## 2025-01-01 PROCEDURE — 25010000002 LORAZEPAM PER 2 MG: Performed by: INTERNAL MEDICINE

## 2025-01-01 PROCEDURE — 63710000001 INSULIN LISPRO (HUMAN) PER 5 UNITS: Performed by: INTERNAL MEDICINE

## 2025-01-01 PROCEDURE — 82948 REAGENT STRIP/BLOOD GLUCOSE: CPT

## 2025-01-01 PROCEDURE — 25010000002 MORPHINE PER 10 MG: Performed by: NURSE PRACTITIONER

## 2025-01-01 PROCEDURE — 71045 X-RAY EXAM CHEST 1 VIEW: CPT | Performed by: RADIOLOGY

## 2025-01-01 PROCEDURE — 84145 PROCALCITONIN (PCT): CPT | Performed by: INTERNAL MEDICINE

## 2025-01-01 PROCEDURE — 25010000002 GLYCOPYRROLATE 0.4 MG/2ML SOLUTION: Performed by: NURSE PRACTITIONER

## 2025-01-01 PROCEDURE — 25010000002 BUMETANIDE PER 0.5 MG: Performed by: INTERNAL MEDICINE

## 2025-01-01 PROCEDURE — 74230 X-RAY XM SWLNG FUNCJ C+: CPT | Performed by: RADIOLOGY

## 2025-01-01 PROCEDURE — 94664 DEMO&/EVAL PT USE INHALER: CPT

## 2025-01-01 PROCEDURE — 92610 EVALUATE SWALLOWING FUNCTION: CPT

## 2025-01-01 PROCEDURE — 93005 ELECTROCARDIOGRAM TRACING: CPT | Performed by: INTERNAL MEDICINE

## 2025-01-01 PROCEDURE — 93010 ELECTROCARDIOGRAM REPORT: CPT | Performed by: INTERNAL MEDICINE

## 2025-01-01 PROCEDURE — 25010000002 HYDRALAZINE PER 20 MG: Performed by: INTERNAL MEDICINE

## 2025-01-01 PROCEDURE — 80053 COMPREHEN METABOLIC PANEL: CPT | Performed by: INTERNAL MEDICINE

## 2025-01-01 PROCEDURE — 94761 N-INVAS EAR/PLS OXIMETRY MLT: CPT

## 2025-01-01 PROCEDURE — 80048 BASIC METABOLIC PNL TOTAL CA: CPT | Performed by: INTERNAL MEDICINE

## 2025-01-01 PROCEDURE — 87040 BLOOD CULTURE FOR BACTERIA: CPT

## 2025-01-01 PROCEDURE — 87468 ANAPLSMA PHGCYTOPHLM AMP PRB: CPT | Performed by: INTERNAL MEDICINE

## 2025-01-01 PROCEDURE — 25010000002 CEFTRIAXONE PER 250 MG: Performed by: INTERNAL MEDICINE

## 2025-01-01 PROCEDURE — 99222 1ST HOSP IP/OBS MODERATE 55: CPT | Performed by: INTERNAL MEDICINE

## 2025-01-01 PROCEDURE — 97162 PT EVAL MOD COMPLEX 30 MIN: CPT

## 2025-01-01 PROCEDURE — 25010000002 METHYLPREDNISOLONE PER 125 MG

## 2025-01-01 PROCEDURE — 0202U NFCT DS 22 TRGT SARS-COV-2: CPT | Performed by: INTERNAL MEDICINE

## 2025-01-01 PROCEDURE — 83880 ASSAY OF NATRIURETIC PEPTIDE: CPT

## 2025-01-01 PROCEDURE — 85379 FIBRIN DEGRADATION QUANT: CPT

## 2025-01-01 PROCEDURE — 25010000002 AZITHROMYCIN PER 500 MG: Performed by: INTERNAL MEDICINE

## 2025-01-01 PROCEDURE — 25510000001 IOPAMIDOL PER 1 ML

## 2025-01-01 PROCEDURE — 85025 COMPLETE CBC W/AUTO DIFF WBC: CPT | Performed by: INTERNAL MEDICINE

## 2025-01-01 PROCEDURE — 85610 PROTHROMBIN TIME: CPT | Performed by: INTERNAL MEDICINE

## 2025-01-01 PROCEDURE — 83735 ASSAY OF MAGNESIUM: CPT | Performed by: INTERNAL MEDICINE

## 2025-01-01 PROCEDURE — 84484 ASSAY OF TROPONIN QUANT: CPT

## 2025-01-01 PROCEDURE — 87484 EHRLICHA CHAFFEENSIS AMP PRB: CPT | Performed by: INTERNAL MEDICINE

## 2025-01-01 PROCEDURE — 84145 PROCALCITONIN (PCT): CPT

## 2025-01-01 PROCEDURE — 82805 BLOOD GASES W/O2 SATURATION: CPT

## 2025-01-01 PROCEDURE — 87798 DETECT AGENT NOS DNA AMP: CPT | Performed by: INTERNAL MEDICINE

## 2025-01-01 PROCEDURE — 25810000003 SODIUM CHLORIDE 0.9 % SOLUTION: Performed by: INTERNAL MEDICINE

## 2025-01-01 PROCEDURE — 71275 CT ANGIOGRAPHY CHEST: CPT | Performed by: RADIOLOGY

## 2025-01-01 PROCEDURE — 94640 AIRWAY INHALATION TREATMENT: CPT

## 2025-01-01 PROCEDURE — C1751 CATH, INF, PER/CENT/MIDLINE: HCPCS

## 2025-01-01 PROCEDURE — 99233 SBSQ HOSP IP/OBS HIGH 50: CPT | Performed by: INTERNAL MEDICINE

## 2025-01-01 PROCEDURE — 25010000002 PIPERACILLIN SOD-TAZOBACTAM PER 1 G: Performed by: INTERNAL MEDICINE

## 2025-01-01 PROCEDURE — 83036 HEMOGLOBIN GLYCOSYLATED A1C: CPT | Performed by: INTERNAL MEDICINE

## 2025-01-01 PROCEDURE — 85025 COMPLETE CBC W/AUTO DIFF WBC: CPT

## 2025-01-01 PROCEDURE — 83050 HGB METHEMOGLOBIN QUAN: CPT

## 2025-01-01 PROCEDURE — 25010000002 FUROSEMIDE PER 20 MG: Performed by: INTERNAL MEDICINE

## 2025-01-01 PROCEDURE — 86618 LYME DISEASE ANTIBODY: CPT | Performed by: INTERNAL MEDICINE

## 2025-01-01 PROCEDURE — 36415 COLL VENOUS BLD VENIPUNCTURE: CPT

## 2025-01-01 PROCEDURE — 80053 COMPREHEN METABOLIC PANEL: CPT

## 2025-01-01 PROCEDURE — 99223 1ST HOSP IP/OBS HIGH 75: CPT

## 2025-01-01 PROCEDURE — 87641 MR-STAPH DNA AMP PROBE: CPT | Performed by: INTERNAL MEDICINE

## 2025-01-01 PROCEDURE — 81003 URINALYSIS AUTO W/O SCOPE: CPT | Performed by: HOSPITALIST

## 2025-01-01 PROCEDURE — 36600 WITHDRAWAL OF ARTERIAL BLOOD: CPT

## 2025-01-01 PROCEDURE — 99285 EMERGENCY DEPT VISIT HI MDM: CPT

## 2025-01-01 PROCEDURE — 74230 X-RAY XM SWLNG FUNCJ C+: CPT

## 2025-01-01 PROCEDURE — 85007 BL SMEAR W/DIFF WBC COUNT: CPT

## 2025-01-01 PROCEDURE — 71275 CT ANGIOGRAPHY CHEST: CPT

## 2025-01-01 PROCEDURE — 85027 COMPLETE CBC AUTOMATED: CPT | Performed by: INTERNAL MEDICINE

## 2025-01-01 PROCEDURE — 25010000002 LORAZEPAM PER 2 MG: Performed by: NURSE PRACTITIONER

## 2025-01-01 PROCEDURE — 92611 MOTION FLUOROSCOPY/SWALLOW: CPT

## 2025-01-01 PROCEDURE — 93005 ELECTROCARDIOGRAM TRACING: CPT

## 2025-01-01 PROCEDURE — 36410 VNPNXR 3YR/> PHY/QHP DX/THER: CPT

## 2025-01-01 PROCEDURE — 25010000002 PIPERACILLIN SOD-TAZOBACTAM PER 1 G

## 2025-01-01 PROCEDURE — 25810000003 LACTATED RINGERS SOLUTION: Performed by: INTERNAL MEDICINE

## 2025-01-01 PROCEDURE — 97166 OT EVAL MOD COMPLEX 45 MIN: CPT

## 2025-01-01 PROCEDURE — 82375 ASSAY CARBOXYHB QUANT: CPT

## 2025-01-01 RX ORDER — GLYCOPYRROLATE 0.2 MG/ML
0.4 INJECTION INTRAMUSCULAR; INTRAVENOUS EVERY 4 HOURS PRN
Status: DISCONTINUED | OUTPATIENT
Start: 2025-01-01 | End: 2025-01-01 | Stop reason: HOSPADM

## 2025-01-01 RX ORDER — SODIUM CHLORIDE 0.9 % (FLUSH) 0.9 %
10 SYRINGE (ML) INJECTION AS NEEDED
Status: DISCONTINUED | OUTPATIENT
Start: 2025-01-01 | End: 2025-01-01 | Stop reason: HOSPADM

## 2025-01-01 RX ORDER — MORPHINE SULFATE 20 MG/ML
10 SOLUTION ORAL
Status: DISCONTINUED | OUTPATIENT
Start: 2025-01-01 | End: 2025-01-01

## 2025-01-01 RX ORDER — DEXTROSE MONOHYDRATE 25 G/50ML
25 INJECTION, SOLUTION INTRAVENOUS
Status: DISCONTINUED | OUTPATIENT
Start: 2025-01-01 | End: 2025-01-01

## 2025-01-01 RX ORDER — DONEPEZIL HYDROCHLORIDE 5 MG/1
10 TABLET, FILM COATED ORAL NIGHTLY
Status: DISCONTINUED | OUTPATIENT
Start: 2025-01-01 | End: 2025-01-01

## 2025-01-01 RX ORDER — LORAZEPAM 2 MG/ML
0.5 INJECTION INTRAMUSCULAR ONCE
Status: COMPLETED | OUTPATIENT
Start: 2025-01-01 | End: 2025-01-01

## 2025-01-01 RX ORDER — SODIUM CHLORIDE 0.9 % (FLUSH) 0.9 %
10 SYRINGE (ML) INJECTION EVERY 12 HOURS SCHEDULED
Status: DISCONTINUED | OUTPATIENT
Start: 2025-01-01 | End: 2025-01-01 | Stop reason: HOSPADM

## 2025-01-01 RX ORDER — AMLODIPINE BESYLATE 5 MG/1
5 TABLET ORAL
Status: DISCONTINUED | OUTPATIENT
Start: 2025-01-01 | End: 2025-01-01

## 2025-01-01 RX ORDER — AMOXICILLIN 250 MG
2 CAPSULE ORAL 2 TIMES DAILY PRN
Status: DISCONTINUED | OUTPATIENT
Start: 2025-01-01 | End: 2025-01-01

## 2025-01-01 RX ORDER — CETIRIZINE HYDROCHLORIDE 10 MG/1
10 TABLET ORAL DAILY
Status: DISCONTINUED | OUTPATIENT
Start: 2025-01-01 | End: 2025-01-01

## 2025-01-01 RX ORDER — SODIUM CHLORIDE 9 MG/ML
40 INJECTION, SOLUTION INTRAVENOUS AS NEEDED
Status: DISCONTINUED | OUTPATIENT
Start: 2025-01-01 | End: 2025-01-01 | Stop reason: HOSPADM

## 2025-01-01 RX ORDER — MORPHINE SULFATE 20 MG/ML
10 SOLUTION ORAL
Status: DISCONTINUED | OUTPATIENT
Start: 2025-01-01 | End: 2025-01-01 | Stop reason: HOSPADM

## 2025-01-01 RX ORDER — ATORVASTATIN CALCIUM 10 MG/1
10 TABLET, FILM COATED ORAL DAILY
Status: DISCONTINUED | OUTPATIENT
Start: 2025-01-01 | End: 2025-01-01

## 2025-01-01 RX ORDER — LUBIPROSTONE 8 UG/1
8 CAPSULE ORAL 2 TIMES DAILY
Status: DISCONTINUED | OUTPATIENT
Start: 2025-01-01 | End: 2025-01-01

## 2025-01-01 RX ORDER — IOPAMIDOL 755 MG/ML
100 INJECTION, SOLUTION INTRAVASCULAR
Status: COMPLETED | OUTPATIENT
Start: 2025-01-01 | End: 2025-01-01

## 2025-01-01 RX ORDER — DULOXETIN HYDROCHLORIDE 60 MG/1
60 CAPSULE, DELAYED RELEASE ORAL DAILY
Status: DISCONTINUED | OUTPATIENT
Start: 2025-01-01 | End: 2025-01-01

## 2025-01-01 RX ORDER — ONDANSETRON 2 MG/ML
4 INJECTION INTRAMUSCULAR; INTRAVENOUS EVERY 4 HOURS PRN
Status: DISCONTINUED | OUTPATIENT
Start: 2025-01-01 | End: 2025-01-01 | Stop reason: HOSPADM

## 2025-01-01 RX ORDER — POTASSIUM CHLORIDE 1.5 G/1.58G
40 POWDER, FOR SOLUTION ORAL ONCE
Status: DISCONTINUED | OUTPATIENT
Start: 2025-01-01 | End: 2025-01-01

## 2025-01-01 RX ORDER — GLUCAGON 1 MG/ML
1 KIT INJECTION
Status: DISCONTINUED | OUTPATIENT
Start: 2025-01-01 | End: 2025-01-01

## 2025-01-01 RX ORDER — MORPHINE SULFATE 2 MG/ML
2 INJECTION, SOLUTION INTRAMUSCULAR; INTRAVENOUS
Status: DISCONTINUED | OUTPATIENT
Start: 2025-01-01 | End: 2025-01-01

## 2025-01-01 RX ORDER — CLONIDINE HYDROCHLORIDE 0.1 MG/1
0.2 TABLET ORAL EVERY 8 HOURS SCHEDULED
Status: DISCONTINUED | OUTPATIENT
Start: 2025-01-01 | End: 2025-01-01

## 2025-01-01 RX ORDER — ASPIRIN 81 MG/1
81 TABLET ORAL DAILY
COMMUNITY

## 2025-01-01 RX ORDER — ATENOLOL 25 MG/1
25 TABLET ORAL DAILY
Status: DISCONTINUED | OUTPATIENT
Start: 2025-01-01 | End: 2025-01-01

## 2025-01-01 RX ORDER — SODIUM CHLORIDE 9 MG/ML
100 INJECTION, SOLUTION INTRAVENOUS CONTINUOUS
Status: ACTIVE | OUTPATIENT
Start: 2025-01-01 | End: 2025-01-01

## 2025-01-01 RX ORDER — ROPINIROLE 0.25 MG/1
0.5 TABLET, FILM COATED ORAL DAILY
Status: DISCONTINUED | OUTPATIENT
Start: 2025-01-01 | End: 2025-01-01

## 2025-01-01 RX ORDER — ONDANSETRON 4 MG/1
4 TABLET, ORALLY DISINTEGRATING ORAL EVERY 6 HOURS PRN
Status: DISCONTINUED | OUTPATIENT
Start: 2025-01-01 | End: 2025-01-01 | Stop reason: HOSPADM

## 2025-01-01 RX ORDER — ESTRADIOL 1 MG/1
1 TABLET ORAL DAILY
Status: DISCONTINUED | OUTPATIENT
Start: 2025-01-01 | End: 2025-01-01

## 2025-01-01 RX ORDER — NYSTATIN 100000 U/G
1 OINTMENT TOPICAL 2 TIMES DAILY
Status: DISCONTINUED | OUTPATIENT
Start: 2025-01-01 | End: 2025-01-01

## 2025-01-01 RX ORDER — NITROGLYCERIN 0.4 MG/1
0.4 TABLET SUBLINGUAL
Status: DISCONTINUED | OUTPATIENT
Start: 2025-01-01 | End: 2025-01-01

## 2025-01-01 RX ORDER — HYDRALAZINE HYDROCHLORIDE 20 MG/ML
10 INJECTION INTRAMUSCULAR; INTRAVENOUS EVERY 6 HOURS PRN
Status: DISCONTINUED | OUTPATIENT
Start: 2025-01-01 | End: 2025-01-01

## 2025-01-01 RX ORDER — PANTOPRAZOLE SODIUM 40 MG/1
40 TABLET, DELAYED RELEASE ORAL
Status: DISCONTINUED | OUTPATIENT
Start: 2025-01-01 | End: 2025-01-01

## 2025-01-01 RX ORDER — CLONAZEPAM 0.5 MG/1
0.5 TABLET ORAL 2 TIMES DAILY PRN
Status: DISCONTINUED | OUTPATIENT
Start: 2025-01-01 | End: 2025-01-01 | Stop reason: HOSPADM

## 2025-01-01 RX ORDER — SCOPOLAMINE 1 MG/3D
1 PATCH, EXTENDED RELEASE TRANSDERMAL
Status: DISCONTINUED | OUTPATIENT
Start: 2025-01-01 | End: 2025-01-01 | Stop reason: HOSPADM

## 2025-01-01 RX ORDER — ALBUTEROL SULFATE 0.83 MG/ML
2.5 SOLUTION RESPIRATORY (INHALATION) EVERY 4 HOURS PRN
Status: DISCONTINUED | OUTPATIENT
Start: 2025-01-01 | End: 2025-01-01 | Stop reason: HOSPADM

## 2025-01-01 RX ORDER — ALBUTEROL SULFATE 90 UG/1
2 INHALANT RESPIRATORY (INHALATION) EVERY 4 HOURS PRN
COMMUNITY

## 2025-01-01 RX ORDER — SODIUM CHLORIDE 9 MG/ML
10 INJECTION, SOLUTION INTRAVENOUS CONTINUOUS
Status: DISCONTINUED | OUTPATIENT
Start: 2025-01-01 | End: 2025-01-01 | Stop reason: HOSPADM

## 2025-01-01 RX ORDER — BISACODYL 10 MG
10 SUPPOSITORY, RECTAL RECTAL DAILY PRN
Status: DISCONTINUED | OUTPATIENT
Start: 2025-01-01 | End: 2025-01-01 | Stop reason: HOSPADM

## 2025-01-01 RX ORDER — ERGOCALCIFEROL 1.25 MG/1
50000 CAPSULE, LIQUID FILLED ORAL WEEKLY
COMMUNITY

## 2025-01-01 RX ORDER — CLONAZEPAM 0.5 MG/1
0.5 TABLET ORAL 2 TIMES DAILY PRN
Status: DISPENSED | OUTPATIENT
Start: 2025-01-01 | End: 2025-01-01

## 2025-01-01 RX ORDER — CALCIUM CARBONATE 500 MG/1
2 TABLET, CHEWABLE ORAL 2 TIMES DAILY PRN
Status: DISCONTINUED | OUTPATIENT
Start: 2025-01-01 | End: 2025-01-01

## 2025-01-01 RX ORDER — ACETAMINOPHEN 325 MG/1
650 TABLET ORAL EVERY 6 HOURS PRN
Status: DISCONTINUED | OUTPATIENT
Start: 2025-01-01 | End: 2025-01-01

## 2025-01-01 RX ORDER — BISACODYL 5 MG/1
5 TABLET, DELAYED RELEASE ORAL DAILY PRN
Status: DISCONTINUED | OUTPATIENT
Start: 2025-01-01 | End: 2025-01-01

## 2025-01-01 RX ORDER — NALOXONE HCL 0.4 MG/ML
0.1 VIAL (ML) INJECTION
Status: DISCONTINUED | OUTPATIENT
Start: 2025-01-01 | End: 2025-01-01

## 2025-01-01 RX ORDER — MORPHINE SULFATE/0.9% NACL/PF 1 MG/ML
SYRINGE (ML) INJECTION CONTINUOUS
Status: DISCONTINUED | OUTPATIENT
Start: 2025-01-01 | End: 2025-01-01

## 2025-01-01 RX ORDER — DONEPEZIL HYDROCHLORIDE 10 MG/1
10 TABLET, FILM COATED ORAL NIGHTLY
COMMUNITY

## 2025-01-01 RX ORDER — MORPHINE SULFATE 20 MG/ML
5 SOLUTION ORAL
Status: DISCONTINUED | OUTPATIENT
Start: 2025-01-01 | End: 2025-01-01

## 2025-01-01 RX ORDER — LEVOTHYROXINE SODIUM 100 UG/1
100 TABLET ORAL
Status: DISCONTINUED | OUTPATIENT
Start: 2025-01-01 | End: 2025-01-01

## 2025-01-01 RX ORDER — ISOSORBIDE MONONITRATE 30 MG/1
30 TABLET, EXTENDED RELEASE ORAL DAILY
Status: DISCONTINUED | OUTPATIENT
Start: 2025-01-01 | End: 2025-01-01

## 2025-01-01 RX ORDER — INSULIN LISPRO 100 [IU]/ML
2-7 INJECTION, SOLUTION INTRAVENOUS; SUBCUTANEOUS
Status: DISCONTINUED | OUTPATIENT
Start: 2025-01-01 | End: 2025-01-01

## 2025-01-01 RX ORDER — LORAZEPAM 2 MG/ML
0.5 INJECTION INTRAMUSCULAR
Status: DISCONTINUED | OUTPATIENT
Start: 2025-01-01 | End: 2025-01-01 | Stop reason: HOSPADM

## 2025-01-01 RX ORDER — ASPIRIN 81 MG/1
81 TABLET ORAL DAILY
Status: DISCONTINUED | OUTPATIENT
Start: 2025-01-01 | End: 2025-01-01

## 2025-01-01 RX ORDER — FUROSEMIDE 10 MG/ML
40 INJECTION INTRAMUSCULAR; INTRAVENOUS ONCE
Status: COMPLETED | OUTPATIENT
Start: 2025-01-01 | End: 2025-01-01

## 2025-01-01 RX ORDER — AMLODIPINE AND BENAZEPRIL HYDROCHLORIDE 5; 40 MG/1; MG/1
1 CAPSULE ORAL DAILY
COMMUNITY

## 2025-01-01 RX ORDER — NYSTATIN 100000 U/G
1 OINTMENT TOPICAL 2 TIMES DAILY
COMMUNITY

## 2025-01-01 RX ORDER — SPIRONOLACTONE 25 MG/1
50 TABLET ORAL 2 TIMES DAILY
Status: DISCONTINUED | OUTPATIENT
Start: 2025-01-01 | End: 2025-01-01

## 2025-01-01 RX ORDER — LISINOPRIL 10 MG/1
40 TABLET ORAL
Status: DISCONTINUED | OUTPATIENT
Start: 2025-01-01 | End: 2025-01-01

## 2025-01-01 RX ORDER — FLUOXETINE 10 MG/1
10 CAPSULE ORAL DAILY
Status: DISCONTINUED | OUTPATIENT
Start: 2025-01-01 | End: 2025-01-01

## 2025-01-01 RX ORDER — GUAIFENESIN/DEXTROMETHORPHAN 100-10MG/5
10 SYRUP ORAL EVERY 4 HOURS PRN
Status: DISCONTINUED | OUTPATIENT
Start: 2025-01-01 | End: 2025-01-01

## 2025-01-01 RX ORDER — POTASSIUM CHLORIDE 1500 MG/1
40 TABLET, EXTENDED RELEASE ORAL ONCE
Status: COMPLETED | OUTPATIENT
Start: 2025-01-01 | End: 2025-01-01

## 2025-01-01 RX ORDER — CLONAZEPAM 0.5 MG/1
0.5 TABLET ORAL 2 TIMES DAILY PRN
COMMUNITY

## 2025-01-01 RX ORDER — MORPHINE SULFATE/0.9% NACL/PF 1 MG/ML
SYRINGE (ML) INJECTION CONTINUOUS
Status: DISCONTINUED | OUTPATIENT
Start: 2025-01-01 | End: 2025-01-01 | Stop reason: HOSPADM

## 2025-01-01 RX ORDER — IPRATROPIUM BROMIDE AND ALBUTEROL SULFATE 2.5; .5 MG/3ML; MG/3ML
3 SOLUTION RESPIRATORY (INHALATION) ONCE
Status: COMPLETED | OUTPATIENT
Start: 2025-01-01 | End: 2025-01-01

## 2025-01-01 RX ORDER — POLYETHYLENE GLYCOL 3350 17 G/17G
17 POWDER, FOR SOLUTION ORAL DAILY PRN
Status: DISCONTINUED | OUTPATIENT
Start: 2025-01-01 | End: 2025-01-01

## 2025-01-01 RX ORDER — NICOTINE POLACRILEX 4 MG
15 LOZENGE BUCCAL
Status: DISCONTINUED | OUTPATIENT
Start: 2025-01-01 | End: 2025-01-01

## 2025-01-01 RX ORDER — BUMETANIDE 0.25 MG/ML
0.5 INJECTION, SOLUTION INTRAMUSCULAR; INTRAVENOUS ONCE
Status: COMPLETED | OUTPATIENT
Start: 2025-01-01 | End: 2025-01-01

## 2025-01-01 RX ORDER — POTASSIUM CHLORIDE 1.5 G/1.58G
40 POWDER, FOR SOLUTION ORAL ONCE
Status: COMPLETED | OUTPATIENT
Start: 2025-01-01 | End: 2025-01-01

## 2025-01-01 RX ORDER — SPIRONOLACTONE 50 MG/1
50 TABLET, FILM COATED ORAL 2 TIMES DAILY
COMMUNITY

## 2025-01-01 RX ADMIN — LISINOPRIL 40 MG: 10 TABLET ORAL at 18:36

## 2025-01-01 RX ADMIN — Medication 10 ML: at 20:55

## 2025-01-01 RX ADMIN — CLONAZEPAM 0.5 MG: 0.5 TABLET ORAL at 08:37

## 2025-01-01 RX ADMIN — SODIUM CHLORIDE, POTASSIUM CHLORIDE, SODIUM LACTATE AND CALCIUM CHLORIDE 250 ML: 600; 310; 30; 20 INJECTION, SOLUTION INTRAVENOUS at 15:31

## 2025-01-01 RX ADMIN — INSULIN LISPRO 2 UNITS: 100 INJECTION, SOLUTION INTRAVENOUS; SUBCUTANEOUS at 10:56

## 2025-01-01 RX ADMIN — Medication 10 ML: at 21:39

## 2025-01-01 RX ADMIN — NYSTATIN 1 APPLICATION: 100000 OINTMENT TOPICAL at 08:52

## 2025-01-01 RX ADMIN — ATORVASTATIN CALCIUM 10 MG: 10 TABLET, FILM COATED ORAL at 08:59

## 2025-01-01 RX ADMIN — CLONIDINE HYDROCHLORIDE 0.2 MG: 0.1 TABLET ORAL at 13:53

## 2025-01-01 RX ADMIN — MORPHINE SULFATE 4 MG: 4 INJECTION, SOLUTION INTRAMUSCULAR; INTRAVENOUS at 16:43

## 2025-01-01 RX ADMIN — CLONIDINE HYDROCHLORIDE 0.2 MG: 0.1 TABLET ORAL at 05:15

## 2025-01-01 RX ADMIN — NYSTATIN 1 APPLICATION: 100000 OINTMENT TOPICAL at 21:21

## 2025-01-01 RX ADMIN — METHYLPREDNISOLONE SODIUM SUCCINATE 125 MG: 125 INJECTION INTRAMUSCULAR; INTRAVENOUS at 09:19

## 2025-01-01 RX ADMIN — INSULIN LISPRO 2 UNITS: 100 INJECTION, SOLUTION INTRAVENOUS; SUBCUTANEOUS at 21:25

## 2025-01-01 RX ADMIN — ALBUTEROL SULFATE 2.5 MG: 2.5 SOLUTION RESPIRATORY (INHALATION) at 07:23

## 2025-01-01 RX ADMIN — FLUOXETINE HYDROCHLORIDE 10 MG: 10 CAPSULE ORAL at 08:45

## 2025-01-01 RX ADMIN — MORPHINE SULFATE 2 MG: 2 INJECTION, SOLUTION INTRAMUSCULAR; INTRAVENOUS at 14:02

## 2025-01-01 RX ADMIN — IPRATROPIUM BROMIDE 0.5 MG: 0.5 SOLUTION RESPIRATORY (INHALATION) at 18:56

## 2025-01-01 RX ADMIN — PIPERACILLIN AND TAZOBACTAM 3.38 G: 3; .375 INJECTION, POWDER, FOR SOLUTION INTRAVENOUS at 21:13

## 2025-01-01 RX ADMIN — LUBIPROSTONE 8 MCG: 8 CAPSULE, GELATIN COATED ORAL at 08:43

## 2025-01-01 RX ADMIN — LUBIPROSTONE 8 MCG: 8 CAPSULE, GELATIN COATED ORAL at 08:50

## 2025-01-01 RX ADMIN — Medication 10 MG: at 21:39

## 2025-01-01 RX ADMIN — FUROSEMIDE 40 MG: 10 INJECTION, SOLUTION INTRAMUSCULAR; INTRAVENOUS at 08:47

## 2025-01-01 RX ADMIN — DULOXETINE HYDROCHLORIDE 60 MG: 60 CAPSULE, DELAYED RELEASE ORAL at 09:04

## 2025-01-01 RX ADMIN — AZITHROMYCIN MONOHYDRATE 500 MG: 500 INJECTION, POWDER, LYOPHILIZED, FOR SOLUTION INTRAVENOUS at 13:59

## 2025-01-01 RX ADMIN — Medication 10 ML: at 09:06

## 2025-01-01 RX ADMIN — NYSTATIN 1 APPLICATION: 100000 OINTMENT TOPICAL at 09:01

## 2025-01-01 RX ADMIN — Medication 10 ML: at 22:06

## 2025-01-01 RX ADMIN — Medication 10 ML: at 21:13

## 2025-01-01 RX ADMIN — DONEPEZIL HYDROCHLORIDE 10 MG: 5 TABLET ORAL at 21:39

## 2025-01-01 RX ADMIN — ASPIRIN 81 MG: 81 TABLET, COATED ORAL at 18:34

## 2025-01-01 RX ADMIN — Medication 10 ML: at 08:03

## 2025-01-01 RX ADMIN — CLONIDINE HYDROCHLORIDE 0.2 MG: 0.1 TABLET ORAL at 14:34

## 2025-01-01 RX ADMIN — INSULIN LISPRO 2 UNITS: 100 INJECTION, SOLUTION INTRAVENOUS; SUBCUTANEOUS at 08:37

## 2025-01-01 RX ADMIN — CLONAZEPAM 0.5 MG: 0.5 TABLET ORAL at 09:15

## 2025-01-01 RX ADMIN — LUBIPROSTONE 8 MCG: 8 CAPSULE, GELATIN COATED ORAL at 20:59

## 2025-01-01 RX ADMIN — AMLODIPINE BESYLATE 5 MG: 5 TABLET ORAL at 08:00

## 2025-01-01 RX ADMIN — Medication 10 ML: at 21:22

## 2025-01-01 RX ADMIN — FLUOXETINE HYDROCHLORIDE 10 MG: 10 CAPSULE ORAL at 08:56

## 2025-01-01 RX ADMIN — ALBUTEROL SULFATE 2.5 MG: 2.5 SOLUTION RESPIRATORY (INHALATION) at 18:51

## 2025-01-01 RX ADMIN — METHYLPREDNISOLONE SODIUM SUCCINATE 1000 MG: 1 INJECTION INTRAMUSCULAR; INTRAVENOUS at 10:07

## 2025-01-01 RX ADMIN — ALBUTEROL SULFATE 2.5 MG: 2.5 SOLUTION RESPIRATORY (INHALATION) at 06:57

## 2025-01-01 RX ADMIN — DONEPEZIL HYDROCHLORIDE 10 MG: 5 TABLET ORAL at 21:00

## 2025-01-01 RX ADMIN — ISOSORBIDE MONONITRATE 30 MG: 30 TABLET, EXTENDED RELEASE ORAL at 08:00

## 2025-01-01 RX ADMIN — INSULIN LISPRO 2 UNITS: 100 INJECTION, SOLUTION INTRAVENOUS; SUBCUTANEOUS at 07:58

## 2025-01-01 RX ADMIN — CETIRIZINE HYDROCHLORIDE 10 MG: 10 TABLET, FILM COATED ORAL at 08:53

## 2025-01-01 RX ADMIN — METHYLPREDNISOLONE SODIUM SUCCINATE 500 MG: 1 INJECTION INTRAMUSCULAR; INTRAVENOUS at 09:05

## 2025-01-01 RX ADMIN — WATER 60 MG: 1 INJECTION INTRAMUSCULAR; INTRAVENOUS; SUBCUTANEOUS at 13:09

## 2025-01-01 RX ADMIN — ALBUTEROL SULFATE 2.5 MG: 2.5 SOLUTION RESPIRATORY (INHALATION) at 07:17

## 2025-01-01 RX ADMIN — Medication 10 MG: at 21:21

## 2025-01-01 RX ADMIN — ATENOLOL 25 MG: 25 TABLET ORAL at 08:45

## 2025-01-01 RX ADMIN — DULOXETINE HYDROCHLORIDE 60 MG: 60 CAPSULE, DELAYED RELEASE ORAL at 08:46

## 2025-01-01 RX ADMIN — HYDRALAZINE HYDROCHLORIDE 10 MG: 20 INJECTION INTRAMUSCULAR; INTRAVENOUS at 16:01

## 2025-01-01 RX ADMIN — ALBUTEROL SULFATE 2.5 MG: 2.5 SOLUTION RESPIRATORY (INHALATION) at 06:50

## 2025-01-01 RX ADMIN — LORAZEPAM 0.5 MG: 2 INJECTION INTRAMUSCULAR; INTRAVENOUS at 16:14

## 2025-01-01 RX ADMIN — LISINOPRIL 40 MG: 10 TABLET ORAL at 07:59

## 2025-01-01 RX ADMIN — PIPERACILLIN AND TAZOBACTAM 3.38 G: 3; .375 INJECTION, POWDER, FOR SOLUTION INTRAVENOUS at 00:06

## 2025-01-01 RX ADMIN — Medication 10 MG: at 20:59

## 2025-01-01 RX ADMIN — MORPHINE SULFATE 2 MG: 2 INJECTION, SOLUTION INTRAMUSCULAR; INTRAVENOUS at 16:33

## 2025-01-01 RX ADMIN — FLUOXETINE HYDROCHLORIDE 10 MG: 10 CAPSULE ORAL at 08:01

## 2025-01-01 RX ADMIN — SPIRONOLACTONE 50 MG: 25 TABLET ORAL at 08:51

## 2025-01-01 RX ADMIN — CLONIDINE HYDROCHLORIDE 0.2 MG: 0.1 TABLET ORAL at 13:22

## 2025-01-01 RX ADMIN — ALBUTEROL SULFATE 2.5 MG: 2.5 SOLUTION RESPIRATORY (INHALATION) at 19:14

## 2025-01-01 RX ADMIN — Medication 10 ML: at 08:46

## 2025-01-01 RX ADMIN — ESTRADIOL 1 MG: 1 TABLET ORAL at 12:11

## 2025-01-01 RX ADMIN — PIPERACILLIN AND TAZOBACTAM 3.38 G: 3; .375 INJECTION, POWDER, FOR SOLUTION INTRAVENOUS at 13:14

## 2025-01-01 RX ADMIN — POTASSIUM CHLORIDE 40 MEQ: 1500 TABLET, EXTENDED RELEASE ORAL at 08:53

## 2025-01-01 RX ADMIN — Medication 10 ML: at 09:12

## 2025-01-01 RX ADMIN — HYDRALAZINE HYDROCHLORIDE 10 MG: 20 INJECTION INTRAMUSCULAR; INTRAVENOUS at 14:28

## 2025-01-01 RX ADMIN — CLONAZEPAM 0.5 MG: 0.5 TABLET ORAL at 21:20

## 2025-01-01 RX ADMIN — ATENOLOL 25 MG: 25 TABLET ORAL at 08:37

## 2025-01-01 RX ADMIN — CETIRIZINE HYDROCHLORIDE 10 MG: 10 TABLET, FILM COATED ORAL at 08:43

## 2025-01-01 RX ADMIN — ASPIRIN 81 MG: 81 TABLET, COATED ORAL at 08:49

## 2025-01-01 RX ADMIN — ROPINIROLE HYDROCHLORIDE 0.5 MG: 0.25 TABLET, FILM COATED ORAL at 09:00

## 2025-01-01 RX ADMIN — CEFTRIAXONE 2000 MG: 2 INJECTION, POWDER, FOR SOLUTION INTRAMUSCULAR; INTRAVENOUS at 16:08

## 2025-01-01 RX ADMIN — ATENOLOL 25 MG: 25 TABLET ORAL at 08:00

## 2025-01-01 RX ADMIN — METHYLPREDNISOLONE SODIUM SUCCINATE 500 MG: 1 INJECTION INTRAMUSCULAR; INTRAVENOUS at 09:01

## 2025-01-01 RX ADMIN — LUBIPROSTONE 8 MCG: 8 CAPSULE, GELATIN COATED ORAL at 20:56

## 2025-01-01 RX ADMIN — CLONIDINE HYDROCHLORIDE 0.2 MG: 0.1 TABLET ORAL at 05:55

## 2025-01-01 RX ADMIN — LUBIPROSTONE 8 MCG: 8 CAPSULE, GELATIN COATED ORAL at 09:02

## 2025-01-01 RX ADMIN — LISINOPRIL 40 MG: 10 TABLET ORAL at 09:03

## 2025-01-01 RX ADMIN — INSULIN LISPRO 3 UNITS: 100 INJECTION, SOLUTION INTRAVENOUS; SUBCUTANEOUS at 17:12

## 2025-01-01 RX ADMIN — GLYCOPYRROLATE 0.4 MG: 0.2 INJECTION INTRAMUSCULAR; INTRAVENOUS at 16:52

## 2025-01-01 RX ADMIN — NYSTATIN 1 APPLICATION: 100000 OINTMENT TOPICAL at 08:02

## 2025-01-01 RX ADMIN — MORPHINE SULFATE 2 MG: 2 INJECTION, SOLUTION INTRAMUSCULAR; INTRAVENOUS at 13:49

## 2025-01-01 RX ADMIN — LORAZEPAM 0.5 MG: 2 INJECTION INTRAMUSCULAR; INTRAVENOUS at 16:33

## 2025-01-01 RX ADMIN — LEVOTHYROXINE SODIUM 100 MCG: 0.1 TABLET ORAL at 09:02

## 2025-01-01 RX ADMIN — CLONAZEPAM 0.5 MG: 0.5 TABLET ORAL at 16:05

## 2025-01-01 RX ADMIN — INSULIN LISPRO 3 UNITS: 100 INJECTION, SOLUTION INTRAVENOUS; SUBCUTANEOUS at 17:11

## 2025-01-01 RX ADMIN — ACETAMINOPHEN 650 MG: 325 TABLET ORAL at 11:22

## 2025-01-01 RX ADMIN — Medication 10 ML: at 08:43

## 2025-01-01 RX ADMIN — AMLODIPINE BESYLATE 5 MG: 5 TABLET ORAL at 08:43

## 2025-01-01 RX ADMIN — NYSTATIN 1 APPLICATION: 100000 OINTMENT TOPICAL at 20:55

## 2025-01-01 RX ADMIN — HYDRALAZINE HYDROCHLORIDE 10 MG: 20 INJECTION INTRAMUSCULAR; INTRAVENOUS at 21:11

## 2025-01-01 RX ADMIN — INSULIN LISPRO 2 UNITS: 100 INJECTION, SOLUTION INTRAVENOUS; SUBCUTANEOUS at 21:08

## 2025-01-01 RX ADMIN — ROPINIROLE HYDROCHLORIDE 0.5 MG: 0.25 TABLET, FILM COATED ORAL at 09:03

## 2025-01-01 RX ADMIN — LUBIPROSTONE 8 MCG: 8 CAPSULE, GELATIN COATED ORAL at 08:00

## 2025-01-01 RX ADMIN — INSULIN LISPRO 2 UNITS: 100 INJECTION, SOLUTION INTRAVENOUS; SUBCUTANEOUS at 08:34

## 2025-01-01 RX ADMIN — INSULIN LISPRO 2 UNITS: 100 INJECTION, SOLUTION INTRAVENOUS; SUBCUTANEOUS at 18:38

## 2025-01-01 RX ADMIN — ESTRADIOL 1 MG: 1 TABLET ORAL at 08:41

## 2025-01-01 RX ADMIN — HYDRALAZINE HYDROCHLORIDE 10 MG: 20 INJECTION INTRAMUSCULAR; INTRAVENOUS at 04:14

## 2025-01-01 RX ADMIN — ATORVASTATIN CALCIUM 10 MG: 10 TABLET, FILM COATED ORAL at 09:04

## 2025-01-01 RX ADMIN — PIPERACILLIN AND TAZOBACTAM 3.38 G: 3; .375 INJECTION, POWDER, FOR SOLUTION INTRAVENOUS at 12:11

## 2025-01-01 RX ADMIN — ALBUTEROL SULFATE 2.5 MG: 2.5 SOLUTION RESPIRATORY (INHALATION) at 18:46

## 2025-01-01 RX ADMIN — ROPINIROLE HYDROCHLORIDE 0.5 MG: 0.25 TABLET, FILM COATED ORAL at 07:58

## 2025-01-01 RX ADMIN — NYSTATIN 1 APPLICATION: 100000 OINTMENT TOPICAL at 21:09

## 2025-01-01 RX ADMIN — CLONAZEPAM 0.5 MG: 0.5 TABLET ORAL at 05:39

## 2025-01-01 RX ADMIN — NYSTATIN 1 APPLICATION: 100000 OINTMENT TOPICAL at 22:06

## 2025-01-01 RX ADMIN — SCOPOLAMINE 1 PATCH: 1.5 PATCH, EXTENDED RELEASE TRANSDERMAL at 13:50

## 2025-01-01 RX ADMIN — CLONIDINE HYDROCHLORIDE 0.2 MG: 0.1 TABLET ORAL at 21:20

## 2025-01-01 RX ADMIN — Medication 10 ML: at 08:45

## 2025-01-01 RX ADMIN — ESTRADIOL 1 MG: 1 TABLET ORAL at 18:32

## 2025-01-01 RX ADMIN — PANTOPRAZOLE SODIUM 40 MG: 40 TABLET, DELAYED RELEASE ORAL at 06:08

## 2025-01-01 RX ADMIN — IOPAMIDOL 70 ML: 755 INJECTION, SOLUTION INTRAVENOUS at 10:05

## 2025-01-01 RX ADMIN — LISINOPRIL 40 MG: 10 TABLET ORAL at 08:29

## 2025-01-01 RX ADMIN — CETIRIZINE HYDROCHLORIDE 10 MG: 10 TABLET, FILM COATED ORAL at 08:00

## 2025-01-01 RX ADMIN — ISOSORBIDE MONONITRATE 30 MG: 30 TABLET, EXTENDED RELEASE ORAL at 08:58

## 2025-01-01 RX ADMIN — PHENOL 1 SPRAY: 1.4 SPRAY ORAL at 21:38

## 2025-01-01 RX ADMIN — Medication 10 ML: at 08:40

## 2025-01-01 RX ADMIN — Medication 10 MG: at 20:57

## 2025-01-01 RX ADMIN — CETIRIZINE HYDROCHLORIDE 10 MG: 10 TABLET, FILM COATED ORAL at 09:03

## 2025-01-01 RX ADMIN — METHYLPREDNISOLONE SODIUM SUCCINATE 1000 MG: 1 INJECTION INTRAMUSCULAR; INTRAVENOUS at 08:48

## 2025-01-01 RX ADMIN — ISOSORBIDE MONONITRATE 30 MG: 30 TABLET, EXTENDED RELEASE ORAL at 09:02

## 2025-01-01 RX ADMIN — MORPHINE SULFATE 2 MG: 2 INJECTION, SOLUTION INTRAMUSCULAR; INTRAVENOUS at 16:36

## 2025-01-01 RX ADMIN — LUBIPROSTONE 8 MCG: 8 CAPSULE, GELATIN COATED ORAL at 21:21

## 2025-01-01 RX ADMIN — Medication 10 ML: at 14:01

## 2025-01-01 RX ADMIN — PIPERACILLIN AND TAZOBACTAM 3.38 G: 3; .375 INJECTION, POWDER, FOR SOLUTION INTRAVENOUS at 04:11

## 2025-01-01 RX ADMIN — HYDRALAZINE HYDROCHLORIDE 10 MG: 20 INJECTION INTRAMUSCULAR; INTRAVENOUS at 03:23

## 2025-01-01 RX ADMIN — INSULIN LISPRO 2 UNITS: 100 INJECTION, SOLUTION INTRAVENOUS; SUBCUTANEOUS at 07:27

## 2025-01-01 RX ADMIN — DOXYCYCLINE 100 MG: 100 INJECTION, POWDER, LYOPHILIZED, FOR SOLUTION INTRAVENOUS at 01:30

## 2025-01-01 RX ADMIN — LORAZEPAM 0.5 MG: 2 INJECTION INTRAMUSCULAR; INTRAVENOUS at 13:09

## 2025-01-01 RX ADMIN — METHYLPREDNISOLONE SODIUM SUCCINATE 500 MG: 1 INJECTION INTRAMUSCULAR; INTRAVENOUS at 11:07

## 2025-01-01 RX ADMIN — DONEPEZIL HYDROCHLORIDE 10 MG: 5 TABLET ORAL at 21:21

## 2025-01-01 RX ADMIN — INSULIN LISPRO 2 UNITS: 100 INJECTION, SOLUTION INTRAVENOUS; SUBCUTANEOUS at 21:47

## 2025-01-01 RX ADMIN — ATORVASTATIN CALCIUM 10 MG: 10 TABLET, FILM COATED ORAL at 07:58

## 2025-01-01 RX ADMIN — IPRATROPIUM BROMIDE 0.5 MG: 0.5 SOLUTION RESPIRATORY (INHALATION) at 10:37

## 2025-01-01 RX ADMIN — INSULIN LISPRO 3 UNITS: 100 INJECTION, SOLUTION INTRAVENOUS; SUBCUTANEOUS at 22:06

## 2025-01-01 RX ADMIN — ALBUTEROL SULFATE 2.5 MG: 2.5 SOLUTION RESPIRATORY (INHALATION) at 06:47

## 2025-01-01 RX ADMIN — IPRATROPIUM BROMIDE AND ALBUTEROL SULFATE 3 ML: .5; 2.5 SOLUTION RESPIRATORY (INHALATION) at 09:15

## 2025-01-01 RX ADMIN — POTASSIUM CHLORIDE 40 MEQ: 1.5 POWDER, FOR SOLUTION ORAL at 13:54

## 2025-01-01 RX ADMIN — ESTRADIOL 1 MG: 1 TABLET ORAL at 08:01

## 2025-01-01 RX ADMIN — LEVOTHYROXINE SODIUM 100 MCG: 0.1 TABLET ORAL at 08:45

## 2025-01-01 RX ADMIN — CLONIDINE HYDROCHLORIDE 0.2 MG: 0.1 TABLET ORAL at 13:54

## 2025-01-01 RX ADMIN — INSULIN LISPRO 2 UNITS: 100 INJECTION, SOLUTION INTRAVENOUS; SUBCUTANEOUS at 11:35

## 2025-01-01 RX ADMIN — Medication 10 ML: at 08:44

## 2025-01-01 RX ADMIN — FLUOXETINE HYDROCHLORIDE 10 MG: 10 CAPSULE ORAL at 12:15

## 2025-01-01 RX ADMIN — CLONIDINE HYDROCHLORIDE 0.2 MG: 0.1 TABLET ORAL at 21:39

## 2025-01-01 RX ADMIN — INSULIN LISPRO 2 UNITS: 100 INJECTION, SOLUTION INTRAVENOUS; SUBCUTANEOUS at 17:08

## 2025-01-01 RX ADMIN — Medication 10 ML: at 09:10

## 2025-01-01 RX ADMIN — CEFTRIAXONE 2000 MG: 2 INJECTION, POWDER, FOR SOLUTION INTRAMUSCULAR; INTRAVENOUS at 16:10

## 2025-01-01 RX ADMIN — INSULIN LISPRO 2 UNITS: 100 INJECTION, SOLUTION INTRAVENOUS; SUBCUTANEOUS at 08:16

## 2025-01-01 RX ADMIN — PANTOPRAZOLE SODIUM 40 MG: 40 TABLET, DELAYED RELEASE ORAL at 05:15

## 2025-01-01 RX ADMIN — PIPERACILLIN AND TAZOBACTAM 3.38 G: 3; .375 INJECTION, POWDER, FOR SOLUTION INTRAVENOUS at 12:00

## 2025-01-01 RX ADMIN — HYDRALAZINE HYDROCHLORIDE 10 MG: 20 INJECTION INTRAMUSCULAR; INTRAVENOUS at 02:24

## 2025-01-01 RX ADMIN — HYDRALAZINE HYDROCHLORIDE 10 MG: 20 INJECTION INTRAMUSCULAR; INTRAVENOUS at 08:39

## 2025-01-01 RX ADMIN — LEVOTHYROXINE SODIUM 100 MCG: 0.1 TABLET ORAL at 07:58

## 2025-01-01 RX ADMIN — AMLODIPINE BESYLATE 5 MG: 5 TABLET ORAL at 08:37

## 2025-01-01 RX ADMIN — CEFTRIAXONE 2000 MG: 2 INJECTION, POWDER, FOR SOLUTION INTRAMUSCULAR; INTRAVENOUS at 15:18

## 2025-01-01 RX ADMIN — ALBUTEROL SULFATE 2.5 MG: 2.5 SOLUTION RESPIRATORY (INHALATION) at 16:58

## 2025-01-01 RX ADMIN — PIPERACILLIN AND TAZOBACTAM 3.38 G: 3; .375 INJECTION, POWDER, FOR SOLUTION INTRAVENOUS at 21:21

## 2025-01-01 RX ADMIN — LISINOPRIL 40 MG: 10 TABLET ORAL at 08:38

## 2025-01-01 RX ADMIN — ASPIRIN 81 MG: 81 TABLET, COATED ORAL at 09:02

## 2025-01-01 RX ADMIN — INSULIN LISPRO 2 UNITS: 100 INJECTION, SOLUTION INTRAVENOUS; SUBCUTANEOUS at 17:26

## 2025-01-01 RX ADMIN — NYSTATIN 1 APPLICATION: 100000 OINTMENT TOPICAL at 20:54

## 2025-01-01 RX ADMIN — AZITHROMYCIN MONOHYDRATE 500 MG: 500 INJECTION, POWDER, LYOPHILIZED, FOR SOLUTION INTRAVENOUS at 13:54

## 2025-01-01 RX ADMIN — INSULIN LISPRO 3 UNITS: 100 INJECTION, SOLUTION INTRAVENOUS; SUBCUTANEOUS at 16:44

## 2025-01-01 RX ADMIN — SODIUM CHLORIDE 100 ML/HR: 9 INJECTION, SOLUTION INTRAVENOUS at 13:57

## 2025-01-01 RX ADMIN — LORAZEPAM 0.5 MG: 2 INJECTION INTRAMUSCULAR; INTRAVENOUS at 16:43

## 2025-01-01 RX ADMIN — ASPIRIN 81 MG: 81 TABLET, COATED ORAL at 08:01

## 2025-01-01 RX ADMIN — LUBIPROSTONE 8 MCG: 8 CAPSULE, GELATIN COATED ORAL at 21:39

## 2025-01-01 RX ADMIN — ESTRADIOL 1 MG: 1 TABLET ORAL at 08:48

## 2025-01-01 RX ADMIN — PIPERACILLIN AND TAZOBACTAM 3.38 G: 3; .375 INJECTION, POWDER, FOR SOLUTION INTRAVENOUS at 04:24

## 2025-01-01 RX ADMIN — FUROSEMIDE 40 MG: 10 INJECTION, SOLUTION INTRAMUSCULAR; INTRAVENOUS at 18:38

## 2025-01-01 RX ADMIN — ROPINIROLE HYDROCHLORIDE 0.5 MG: 0.25 TABLET, FILM COATED ORAL at 08:40

## 2025-01-01 RX ADMIN — DULOXETINE HYDROCHLORIDE 60 MG: 60 CAPSULE, DELAYED RELEASE ORAL at 07:59

## 2025-01-01 RX ADMIN — DONEPEZIL HYDROCHLORIDE 10 MG: 5 TABLET ORAL at 20:57

## 2025-01-01 RX ADMIN — SPIRONOLACTONE 50 MG: 25 TABLET ORAL at 08:45

## 2025-01-01 RX ADMIN — DOXYCYCLINE 100 MG: 100 INJECTION, POWDER, LYOPHILIZED, FOR SOLUTION INTRAVENOUS at 15:53

## 2025-01-01 RX ADMIN — ATENOLOL 25 MG: 25 TABLET ORAL at 09:04

## 2025-01-01 RX ADMIN — ISOSORBIDE MONONITRATE 30 MG: 30 TABLET, EXTENDED RELEASE ORAL at 08:41

## 2025-01-01 RX ADMIN — NYSTATIN 1 APPLICATION: 100000 OINTMENT TOPICAL at 21:39

## 2025-01-01 RX ADMIN — PIPERACILLIN AND TAZOBACTAM 3.38 G: 3; .375 INJECTION, POWDER, FOR SOLUTION INTRAVENOUS at 06:37

## 2025-01-01 RX ADMIN — METHYLPREDNISOLONE SODIUM SUCCINATE 1000 MG: 1 INJECTION INTRAMUSCULAR; INTRAVENOUS at 19:53

## 2025-01-01 RX ADMIN — NYSTATIN 1 APPLICATION: 100000 OINTMENT TOPICAL at 08:39

## 2025-01-01 RX ADMIN — ATORVASTATIN CALCIUM 10 MG: 10 TABLET, FILM COATED ORAL at 08:43

## 2025-01-01 RX ADMIN — ASPIRIN 81 MG: 81 TABLET, COATED ORAL at 08:45

## 2025-01-01 RX ADMIN — BUMETANIDE 0.5 MG: 0.25 INJECTION INTRAMUSCULAR; INTRAVENOUS at 12:12

## 2025-01-01 RX ADMIN — AZITHROMYCIN MONOHYDRATE 500 MG: 500 INJECTION, POWDER, LYOPHILIZED, FOR SOLUTION INTRAVENOUS at 13:56

## 2025-01-01 RX ADMIN — CLONIDINE HYDROCHLORIDE 0.2 MG: 0.1 TABLET ORAL at 15:50

## 2025-01-01 RX ADMIN — INSULIN LISPRO 3 UNITS: 100 INJECTION, SOLUTION INTRAVENOUS; SUBCUTANEOUS at 11:19

## 2025-01-01 RX ADMIN — AMLODIPINE BESYLATE 5 MG: 5 TABLET ORAL at 09:04

## 2025-01-01 RX ADMIN — CEFTRIAXONE 2000 MG: 2 INJECTION, POWDER, FOR SOLUTION INTRAMUSCULAR; INTRAVENOUS at 15:27

## 2025-01-07 ENCOUNTER — OFFICE VISIT (OUTPATIENT)
Dept: CARDIOLOGY | Facility: CLINIC | Age: 80
End: 2025-01-07
Payer: MEDICARE

## 2025-01-07 VITALS — HEIGHT: 62 IN | BODY MASS INDEX: 30.29 KG/M2 | OXYGEN SATURATION: 97 % | WEIGHT: 164.6 LBS

## 2025-01-07 DIAGNOSIS — I20.89 ANGINAL EQUIVALENT: Primary | ICD-10-CM

## 2025-01-07 DIAGNOSIS — I63.9 CRYPTOGENIC STROKE: ICD-10-CM

## 2025-01-07 DIAGNOSIS — I10 ESSENTIAL HYPERTENSION: ICD-10-CM

## 2025-01-07 RX ORDER — OLMESARTAN MEDOXOMIL 40 MG/1
40 TABLET ORAL DAILY
Qty: 30 TABLET | Refills: 2 | Status: SHIPPED | OUTPATIENT
Start: 2025-01-07

## 2025-01-07 NOTE — PROGRESS NOTES
Marisa Arias APRN  Gregoria Pendleton  1945 01/07/2025    Patient Active Problem List   Diagnosis    Pulmonary fibrosis    Essential hypertension    Cryptogenic stroke    Esophageal dysphagia    Gastroesophageal reflux disease       Dear Marisa Arias APRN:    Subjective     History of Present Illness:    Chief Complaint   Patient presents with    Follow-up     2 Month Follow up      Med Management     No changes, verbal.         Gregoria Pendleton is a pleasant 79 y.o. female with a past medical history significant for occipital CVA of unknown etiology in 2021. She also has pulmonary fibrosis now following with pulmonology. She herself denies any history of tobacco abuse or known exposures to pulmonary toxins. She does have diabetes mellitus, essential hypertension, and dyslipidemia. She comes in today for cardiology follow-up.     History of Present Illness  The patient presents with a history of cryptogenic cerebrovascular accident (CVA), essential hypertension, and dyslipidemia.    The patient was hospitalized on 12/26/2024 at [facility] due to respiratory distress.  However workup in the ER was unremarkable showing no acute events. A follow-up appointment with Dr. Tripp, a pulmonologist, is scheduled for tomorrow.    The patient describes an internal burning sensation in the head upon standing, comparable to transient visual loss, accompanied by a sensation of impending syncope.  However she sits down and symptoms improved.  I did have orthostatic vital signs checked in the office and her blood pressure dropped from 117/57 to 85/52.  She does report this was accompanied with some slight dizziness.  She does take Bumex daily and has been taking spironolactone it is prescribed for her to take 50 mg twice daily but reports she has only been taking it once daily and often forgets to take dosages.           Allergies   Allergen Reactions    Ampicillin Diarrhea    Clonidine Other (See  Comments)     Patches cause chemical burns   :      Current Outpatient Medications:     aspirin 81 MG chewable tablet, Chew 1 tablet Daily., Disp: , Rfl:     atenolol (TENORMIN) 25 MG tablet, Take 1 tablet by mouth Daily., Disp: , Rfl:     cetirizine (zyrTEC) 10 MG tablet, Take 1 tablet by mouth Daily., Disp: , Rfl:     cloNIDine (CATAPRES) 0.2 MG tablet, 1 tablet 2 (Two) Times a Day., Disp: , Rfl:     DULoxetine (CYMBALTA) 60 MG capsule, Take 1 capsule by mouth Daily., Disp: , Rfl:     Emgality 120 MG/ML auto-injector pen, , Disp: , Rfl:     estradiol (ESTRACE) 1 MG tablet, Take 1 tablet by mouth Daily., Disp: , Rfl:     levothyroxine (SYNTHROID, LEVOTHROID) 100 MCG tablet, Take 75 mcg by mouth Daily., Disp: , Rfl:     Linzess 145 MCG capsule capsule, , Disp: , Rfl:     lovastatin (MEVACOR) 20 MG tablet, , Disp: , Rfl:     neomycin-polymyxin-dexamethamethasone (POLYDEX) 3.5-98734-4.1 ointment ophthalmic ointment, , Disp: , Rfl:     nystatin (MYCOSTATIN) 100,000 unit/mL suspension, Take 5 mL by mouth 4 (Four) Times a Day., Disp: 280 mL, Rfl: 0    omega-3 acid ethyl esters (LOVAZA) 1 g capsule, , Disp: , Rfl:     omeprazole (priLOSEC) 40 MG capsule, Take 1 capsule by mouth 2 (Two) Times a Day., Disp: 60 capsule, Rfl: 3    ondansetron (Zofran) 4 MG tablet, Take 1 tablet by mouth Every 8 (Eight) Hours As Needed for Nausea or Vomiting., Disp: 30 tablet, Rfl: 4    rOPINIRole (REQUIP) 0.5 MG tablet, , Disp: , Rfl:     olmesartan (Benicar) 40 MG tablet, Take 1 tablet by mouth Daily., Disp: 30 tablet, Rfl: 2    The following portions of the patient's history were reviewed and updated as appropriate: allergies, current medications, past family history, past medical history, past social history, past surgical history and problem list.    Social History     Tobacco Use    Smoking status: Never     Passive exposure: Never    Smokeless tobacco: Never   Vaping Use    Vaping status: Never Used   Substance Use Topics    Alcohol  "use: Never    Drug use: Never         Objective   Vitals:    01/07/25 1146 01/07/25 1147 01/07/25 1148 01/07/25 1149   BP Location:       Patient Position:       Cuff Size:       SpO2: 97% 97% 96% 97%   Weight:       Height:         Body mass index is 30.1 kg/m².    ROS    Constitutional:       General: Not in acute distress.     Appearance: Healthy appearance. Well-developed and not in distress. Not diaphoretic.   Eyes:      Conjunctiva/sclera: Conjunctivae normal.      Pupils: Pupils are equal, round, and reactive to light.   HENT:      Head: Normocephalic and atraumatic.   Neck:      Vascular: No carotid bruit or JVD.   Pulmonary:      Effort: Pulmonary effort is normal. No respiratory distress.      Breath sounds: Normal breath sounds.   Cardiovascular:      Normal rate. Regular rhythm.   Edema:     Peripheral edema absent.   Skin:     General: Skin is cool.   Neurological:      Mental Status: Alert, oriented to person, place, and time and oriented to person, place and time.         Lab Results   Component Value Date     10/23/2023    K 4.4 10/23/2023     10/23/2023    CO2 27.0 10/23/2023    BUN 26 (H) 10/23/2023    CREATININE 1.02 (H) 10/23/2023    GLUCOSE 113 (H) 10/23/2023    CALCIUM 10.2 10/23/2023    AST 24 08/22/2024    ALT 15 08/22/2024    ALKPHOS 89 08/22/2024     No results found for: \"CKTOTAL\"  Lab Results   Component Value Date    WBC 10.87 (H) 04/26/2024    HGB 16.1 (H) 04/26/2024    HCT 49.0 (H) 04/26/2024     04/26/2024     Lab Results   Component Value Date    INR 0.95 12/26/2024     No results found for: \"MG\"  Lab Results   Component Value Date    TSH 1.410 02/12/2021      No results found for: \"BNP\"    During this visit the following were done:  Labs Reviewed []    Labs Ordered []    Radiology Reports Reviewed []    Radiology Ordered []    PCP Records Reviewed []    Referring Provider Records Reviewed []    ER Records Reviewed []    Hospital Records Reviewed []    History " Obtained From Family []    Radiology Images Reviewed []    Other Reviewed []    Records Requested []       Procedures    Assessment & Plan    Diagnosis Plan   1. Anginal equivalent  Stress Test With Myocardial Perfusion One Day      2. Essential hypertension        3. Cryptogenic stroke                 Assessment & Plan  1. Shortness of breath  - Likely due to pulmonary fibrosis, but cardiac etiology not ruled out  - Lexiscan stress test ordered  - If abnormal, consider CT coronary angiogram or left heart catheterization  - If normal or mildly abnormal, CT coronary angiogram may still be pursued    2. Dizziness and blurred vision  -Patient had positive orthostasis on exam today with blood pressure dropping from 117/57 to 85/52 associated with dizziness  -I will make Bumex only as needed for increasing pedal edema or shortness of  -I will stop spironolactone she already is only taking this sporadically and not consistently reporting that she only takes this 2-3 times a week citing that she forgets  -Will stop amlodipine as well  - Monitor blood pressure daily and try to check blood pressure often while standing as well.    3. Essential hypertension  -Continue with change in shoe mentioned above    4. Dyslipidemia  - No changes to treatment plan    5. Cryptogenic CVA    PROCEDURE  Throat dilation and biopsy on 12/12/2024.      No follow-ups on file.    As always, I appreciate very much the opportunity to participate in the cardiovascular care of your patients.      With Best Regards,    Ken Ordonez PA-C    Patient or patient representative verbalized consent for the use of Ambient Listening during the visit with  Ken Ordonez PA-C for chart documentation. 1/7/2025  11:58 EST

## 2025-02-01 ENCOUNTER — HOSPITAL ENCOUNTER (EMERGENCY)
Facility: HOSPITAL | Age: 80
Discharge: HOME | End: 2025-02-02
Attending: EMERGENCY MEDICINE
Payer: MEDICARE

## 2025-02-01 ENCOUNTER — APPOINTMENT (OUTPATIENT)
Dept: CT IMAGING | Facility: HOSPITAL | Age: 80
End: 2025-02-01
Payer: MEDICARE

## 2025-02-01 ENCOUNTER — APPOINTMENT (OUTPATIENT)
Dept: GENERAL RADIOLOGY | Facility: HOSPITAL | Age: 80
End: 2025-02-01
Payer: MEDICARE

## 2025-02-01 DIAGNOSIS — J44.1 COPD EXACERBATION: ICD-10-CM

## 2025-02-01 DIAGNOSIS — J84.10 PULMONARY FIBROSIS: Primary | ICD-10-CM

## 2025-02-01 LAB
ALBUMIN SERPL-MCNC: 3.9 G/DL (ref 3.5–5.2)
ALBUMIN/GLOB SERPL: 1.1 G/DL
ALP SERPL-CCNC: 92 U/L (ref 39–117)
ALT SERPL W P-5'-P-CCNC: 13 U/L (ref 1–33)
ANION GAP SERPL CALCULATED.3IONS-SCNC: 10.5 MMOL/L (ref 5–15)
AST SERPL-CCNC: 19 U/L (ref 1–32)
B PARAPERT DNA SPEC QL NAA+PROBE: NOT DETECTED
B PERT DNA SPEC QL NAA+PROBE: NOT DETECTED
BASOPHILS # BLD AUTO: 0.06 10*3/MM3 (ref 0–0.2)
BASOPHILS NFR BLD AUTO: 0.5 % (ref 0–1.5)
BILIRUB SERPL-MCNC: 0.2 MG/DL (ref 0–1.2)
BUN SERPL-MCNC: 16 MG/DL (ref 8–23)
BUN/CREAT SERPL: 18 (ref 7–25)
C PNEUM DNA NPH QL NAA+NON-PROBE: NOT DETECTED
CALCIUM SPEC-SCNC: 9.6 MG/DL (ref 8.6–10.5)
CHLORIDE SERPL-SCNC: 104 MMOL/L (ref 98–107)
CO2 SERPL-SCNC: 25.5 MMOL/L (ref 22–29)
CREAT SERPL-MCNC: 0.89 MG/DL (ref 0.57–1)
DEPRECATED RDW RBC AUTO: 47.8 FL (ref 37–54)
EGFRCR SERPLBLD CKD-EPI 2021: 66.9 ML/MIN/1.73
EOSINOPHIL # BLD AUTO: 0.13 10*3/MM3 (ref 0–0.4)
EOSINOPHIL NFR BLD AUTO: 1.1 % (ref 0.3–6.2)
ERYTHROCYTE [DISTWIDTH] IN BLOOD BY AUTOMATED COUNT: 14.1 % (ref 12.3–15.4)
FLUAV SUBTYP SPEC NAA+PROBE: NOT DETECTED
FLUBV RNA ISLT QL NAA+PROBE: NOT DETECTED
GEN 5 1HR TROPONIN T REFLEX: 13 NG/L
GLOBULIN UR ELPH-MCNC: 3.7 GM/DL
GLUCOSE SERPL-MCNC: 160 MG/DL (ref 65–99)
HADV DNA SPEC NAA+PROBE: NOT DETECTED
HCOV 229E RNA SPEC QL NAA+PROBE: NOT DETECTED
HCOV HKU1 RNA SPEC QL NAA+PROBE: NOT DETECTED
HCOV NL63 RNA SPEC QL NAA+PROBE: NOT DETECTED
HCOV OC43 RNA SPEC QL NAA+PROBE: NOT DETECTED
HCT VFR BLD AUTO: 43.7 % (ref 34–46.6)
HGB BLD-MCNC: 13.9 G/DL (ref 12–15.9)
HMPV RNA NPH QL NAA+NON-PROBE: NOT DETECTED
HOLD SPECIMEN: NORMAL
HOLD SPECIMEN: NORMAL
HPIV1 RNA ISLT QL NAA+PROBE: NOT DETECTED
HPIV2 RNA SPEC QL NAA+PROBE: NOT DETECTED
HPIV3 RNA NPH QL NAA+PROBE: NOT DETECTED
HPIV4 P GENE NPH QL NAA+PROBE: NOT DETECTED
IMM GRANULOCYTES # BLD AUTO: 0.04 10*3/MM3 (ref 0–0.05)
IMM GRANULOCYTES NFR BLD AUTO: 0.3 % (ref 0–0.5)
LYMPHOCYTES # BLD AUTO: 2.55 10*3/MM3 (ref 0.7–3.1)
LYMPHOCYTES NFR BLD AUTO: 21.6 % (ref 19.6–45.3)
M PNEUMO IGG SER IA-ACNC: NOT DETECTED
MCH RBC QN AUTO: 29 PG (ref 26.6–33)
MCHC RBC AUTO-ENTMCNC: 31.8 G/DL (ref 31.5–35.7)
MCV RBC AUTO: 91.2 FL (ref 79–97)
MONOCYTES # BLD AUTO: 0.33 10*3/MM3 (ref 0.1–0.9)
MONOCYTES NFR BLD AUTO: 2.8 % (ref 5–12)
NEUTROPHILS NFR BLD AUTO: 73.7 % (ref 42.7–76)
NEUTROPHILS NFR BLD AUTO: 8.67 10*3/MM3 (ref 1.7–7)
NRBC BLD AUTO-RTO: 0 /100 WBC (ref 0–0.2)
NT-PROBNP SERPL-MCNC: 203.1 PG/ML (ref 0–1800)
PLATELET # BLD AUTO: 397 10*3/MM3 (ref 140–450)
PMV BLD AUTO: 9.1 FL (ref 6–12)
POTASSIUM SERPL-SCNC: 4.3 MMOL/L (ref 3.5–5.2)
PROT SERPL-MCNC: 7.6 G/DL (ref 6–8.5)
RBC # BLD AUTO: 4.79 10*6/MM3 (ref 3.77–5.28)
RHINOVIRUS RNA SPEC NAA+PROBE: NOT DETECTED
RSV RNA NPH QL NAA+NON-PROBE: NOT DETECTED
SARS-COV-2 RNA RESP QL NAA+PROBE: NOT DETECTED
SODIUM SERPL-SCNC: 140 MMOL/L (ref 136–145)
TROPONIN T % DELTA: -7
TROPONIN T NUMERIC DELTA: -1 NG/L
TROPONIN T SERPL HS-MCNC: 14 NG/L
WBC NRBC COR # BLD AUTO: 11.78 10*3/MM3 (ref 3.4–10.8)
WHOLE BLOOD HOLD COAG: NORMAL
WHOLE BLOOD HOLD SPECIMEN: NORMAL

## 2025-02-01 PROCEDURE — 36415 COLL VENOUS BLD VENIPUNCTURE: CPT

## 2025-02-01 PROCEDURE — 84484 ASSAY OF TROPONIN QUANT: CPT | Performed by: EMERGENCY MEDICINE

## 2025-02-01 PROCEDURE — 71045 X-RAY EXAM CHEST 1 VIEW: CPT

## 2025-02-01 PROCEDURE — 94664 DEMO&/EVAL PT USE INHALER: CPT

## 2025-02-01 PROCEDURE — 0202U NFCT DS 22 TRGT SARS-COV-2: CPT | Performed by: EMERGENCY MEDICINE

## 2025-02-01 PROCEDURE — 99285 EMERGENCY DEPT VISIT HI MDM: CPT

## 2025-02-01 PROCEDURE — 71275 CT ANGIOGRAPHY CHEST: CPT | Performed by: RADIOLOGY

## 2025-02-01 PROCEDURE — 94761 N-INVAS EAR/PLS OXIMETRY MLT: CPT

## 2025-02-01 PROCEDURE — 85025 COMPLETE CBC W/AUTO DIFF WBC: CPT | Performed by: EMERGENCY MEDICINE

## 2025-02-01 PROCEDURE — 25510000001 IOPAMIDOL PER 1 ML: Performed by: EMERGENCY MEDICINE

## 2025-02-01 PROCEDURE — 94799 UNLISTED PULMONARY SVC/PX: CPT

## 2025-02-01 PROCEDURE — 80053 COMPREHEN METABOLIC PANEL: CPT | Performed by: EMERGENCY MEDICINE

## 2025-02-01 PROCEDURE — 25010000002 METHYLPREDNISOLONE PER 125 MG: Performed by: EMERGENCY MEDICINE

## 2025-02-01 PROCEDURE — 96374 THER/PROPH/DIAG INJ IV PUSH: CPT

## 2025-02-01 PROCEDURE — 94640 AIRWAY INHALATION TREATMENT: CPT

## 2025-02-01 PROCEDURE — 93005 ELECTROCARDIOGRAM TRACING: CPT | Performed by: EMERGENCY MEDICINE

## 2025-02-01 PROCEDURE — 83880 ASSAY OF NATRIURETIC PEPTIDE: CPT | Performed by: EMERGENCY MEDICINE

## 2025-02-01 PROCEDURE — 71045 X-RAY EXAM CHEST 1 VIEW: CPT | Performed by: RADIOLOGY

## 2025-02-01 PROCEDURE — 71275 CT ANGIOGRAPHY CHEST: CPT

## 2025-02-01 RX ORDER — ASPIRIN 81 MG/1
324 TABLET, CHEWABLE ORAL ONCE
Status: COMPLETED | OUTPATIENT
Start: 2025-02-01 | End: 2025-02-01

## 2025-02-01 RX ORDER — IOPAMIDOL 755 MG/ML
100 INJECTION, SOLUTION INTRAVASCULAR
Status: COMPLETED | OUTPATIENT
Start: 2025-02-01 | End: 2025-02-01

## 2025-02-01 RX ORDER — IPRATROPIUM BROMIDE AND ALBUTEROL SULFATE 2.5; .5 MG/3ML; MG/3ML
3 SOLUTION RESPIRATORY (INHALATION) ONCE
Status: COMPLETED | OUTPATIENT
Start: 2025-02-01 | End: 2025-02-01

## 2025-02-01 RX ORDER — CLONIDINE HYDROCHLORIDE 0.1 MG/1
0.1 TABLET ORAL ONCE
Status: COMPLETED | OUTPATIENT
Start: 2025-02-01 | End: 2025-02-01

## 2025-02-01 RX ORDER — METHYLPREDNISOLONE SODIUM SUCCINATE 125 MG/2ML
125 INJECTION, POWDER, LYOPHILIZED, FOR SOLUTION INTRAMUSCULAR; INTRAVENOUS ONCE
Status: COMPLETED | OUTPATIENT
Start: 2025-02-01 | End: 2025-02-01

## 2025-02-01 RX ORDER — SODIUM CHLORIDE 0.9 % (FLUSH) 0.9 %
10 SYRINGE (ML) INJECTION AS NEEDED
Status: DISCONTINUED | OUTPATIENT
Start: 2025-02-01 | End: 2025-02-02 | Stop reason: HOSPADM

## 2025-02-01 RX ADMIN — IOPAMIDOL 85 ML: 755 INJECTION, SOLUTION INTRAVENOUS at 23:15

## 2025-02-01 RX ADMIN — ASPIRIN 324 MG: 81 TABLET, CHEWABLE ORAL at 20:48

## 2025-02-01 RX ADMIN — METHYLPREDNISOLONE SODIUM SUCCINATE 125 MG: 125 INJECTION, POWDER, FOR SOLUTION INTRAMUSCULAR; INTRAVENOUS at 22:26

## 2025-02-01 RX ADMIN — CLONIDINE HYDROCHLORIDE 0.1 MG: 0.1 TABLET ORAL at 21:55

## 2025-02-01 RX ADMIN — IPRATROPIUM BROMIDE AND ALBUTEROL SULFATE 3 ML: .5; 2.5 SOLUTION RESPIRATORY (INHALATION) at 21:47

## 2025-02-02 ENCOUNTER — NURSE TRIAGE (OUTPATIENT)
Dept: CALL CENTER | Facility: HOSPITAL | Age: 80
End: 2025-02-02
Payer: MEDICARE

## 2025-02-02 VITALS
HEIGHT: 62 IN | OXYGEN SATURATION: 99 % | RESPIRATION RATE: 18 BRPM | TEMPERATURE: 98.1 F | DIASTOLIC BLOOD PRESSURE: 86 MMHG | HEART RATE: 76 BPM | SYSTOLIC BLOOD PRESSURE: 180 MMHG | WEIGHT: 164 LBS | BODY MASS INDEX: 30.18 KG/M2

## 2025-02-02 LAB
QT INTERVAL: 370 MS
QT INTERVAL: 394 MS
QTC INTERVAL: 434 MS
QTC INTERVAL: 437 MS

## 2025-02-02 RX ORDER — METHYLPREDNISOLONE 4 MG/1
TABLET ORAL
Qty: 21 TABLET | Refills: 0 | Status: SHIPPED | OUTPATIENT
Start: 2025-02-02

## 2025-02-02 RX ORDER — IPRATROPIUM BROMIDE AND ALBUTEROL SULFATE 2.5; .5 MG/3ML; MG/3ML
3 SOLUTION RESPIRATORY (INHALATION) EVERY 4 HOURS PRN
Qty: 360 ML | Refills: 0 | Status: SHIPPED | OUTPATIENT
Start: 2025-02-02

## 2025-02-02 RX ORDER — NITROGLYCERIN 0.4 MG/1
0.4 TABLET SUBLINGUAL 2 TIMES DAILY PRN
Qty: 25 TABLET | Refills: 0 | Status: SHIPPED | OUTPATIENT
Start: 2025-02-02

## 2025-02-02 RX ORDER — DOXYCYCLINE 100 MG/1
100 CAPSULE ORAL ONCE
Status: COMPLETED | OUTPATIENT
Start: 2025-02-02 | End: 2025-02-02

## 2025-02-02 RX ORDER — DOXYCYCLINE 100 MG/1
100 CAPSULE ORAL EVERY 12 HOURS
Qty: 19 CAPSULE | Refills: 0 | Status: SHIPPED | OUTPATIENT
Start: 2025-02-02 | End: 2025-02-12

## 2025-02-02 RX ADMIN — DOXYCYCLINE 100 MG: 100 CAPSULE ORAL at 01:47

## 2025-02-02 NOTE — ED PROVIDER NOTES
Subjective   History of Present Illness  Patient is a 79-year-old female who presents in the company of family with a chief complaint of shortness of breath.  Initially the patient told me that she is always short of breath and that her breathing is at her baseline, and that the reason she came here tonight was because her blood pressure was elevated.  She states that recently her cardiologist stopped one of her blood pressure medicines because her blood pressure was getting too low at times.  A January cardiology visit with Ken Ordonez shows that the patient was on amlodipine, and that this was discontinued due to orthostatic hypotension.  The family however corrects the patient and advises that she has been more short of breath than normal over the past 2 to 3 days, at which point the patient does admit to this.  She has been coughing up clear watery mucus.  She reports that she chronically has substernal chest pain that is made worse by deep breathing or coughing, states that this has been the case for at least several months and is no different today than usual, states that this has been diagnosed as esophageal spasm.  She denies fever, chills, hemoptysis, abdominal pain, vomiting, syncope or near syncope, focal numbness or weakness, other symptoms or other complaints.  Patient reports a history of COPD, states she is on 2 L of oxygen at all times but does not use any breathing treatments, only unspecified inhalers.  She does state that she follows with pulmonology, Dr. Tripp in [place]; she states that he is retiring and one of his partners will be taking over her case.  Records indicate that the patient has been diagnosed with pulmonary fibrosis.    Patient's medication list as of her January cardiology visit:  Current Medications     Current Outpatient Medications:   ·  aspirin 81 MG chewable tablet, Chew 1 tablet Daily., Disp: , Rfl:   ·  atenolol (TENORMIN) 25 MG tablet, Take 1 tablet by mouth  Daily., Disp: , Rfl:   ·  cetirizine (zyrTEC) 10 MG tablet, Take 1 tablet by mouth Daily., Disp: , Rfl:   ·  cloNIDine (CATAPRES) 0.2 MG tablet, 1 tablet 2 (Two) Times a Day., Disp: , Rfl:   ·  DULoxetine (CYMBALTA) 60 MG capsule, Take 1 capsule by mouth Daily., Disp: , Rfl:   ·  Emgality 120 MG/ML auto-injector pen, , Disp: , Rfl:   ·  estradiol (ESTRACE) 1 MG tablet, Take 1 tablet by mouth Daily., Disp: , Rfl:   ·  levothyroxine (SYNTHROID, LEVOTHROID) 100 MCG tablet, Take 75 mcg by mouth Daily., Disp: , Rfl:   ·  Linzess 145 MCG capsule capsule, , Disp: , Rfl:   ·  lovastatin (MEVACOR) 20 MG tablet, , Disp: , Rfl:   ·  neomycin-polymyxin-dexamethamethasone (POLYDEX) 3.5-01123-5.1 ointment ophthalmic ointment, , Disp: , Rfl:   ·  nystatin (MYCOSTATIN) 100,000 unit/mL suspension, Take 5 mL by mouth 4 (Four) Times a Day., Disp: 280 mL, Rfl: 0  ·  omega-3 acid ethyl esters (LOVAZA) 1 g capsule, , Disp: , Rfl:   ·  omeprazole (priLOSEC) 40 MG capsule, Take 1 capsule by mouth 2 (Two) Times a Day., Disp: 60 capsule, Rfl: 3  ·  ondansetron (Zofran) 4 MG tablet, Take 1 tablet by mouth Every 8 (Eight) Hours As Needed for Nausea or Vomiting., Disp: 30 tablet, Rfl: 4  ·  rOPINIRole (REQUIP) 0.5 MG tablet, , Disp: , Rfl:   ·  olmesartan (Benicar) 40 MG tablet, Take 1 tablet by mouth Daily., Disp: 30 tablet, Rfl: 2              Review of Systems   All other systems reviewed and are negative.      Past Medical History:   Diagnosis Date    Arthritis     Disease of thyroid gland     Elevated cholesterol     GERD (gastroesophageal reflux disease)     Hyperlipidemia     Hypertension     Migraine     Pulmonary fibrosis     Stroke        Allergies   Allergen Reactions    Ampicillin Diarrhea    Clonidine Other (See Comments)     Patches cause chemical burns       Past Surgical History:   Procedure Laterality Date    CYSTOSCOPY W/ LASER LITHOTRIPSY  01/15/2024    ENDOSCOPY N/A 12/12/2024    Procedure: ESOPHAGOGASTRODUODENOSCOPY WITH  BIOPSY;  Surgeon: Jaky Goddard MD;  Location: [facility];  Service: Gastroenterology;  Laterality: N/A;  dilated to 20mm    HYSTERECTOMY  age 38    TUBAL ABDOMINAL LIGATION Bilateral        Family History   Problem Relation Age of Onset    Heart failure Father     Heart disease Sister     Heart attack Sister     Breast cancer Neg Hx        Social History     Socioeconomic History    Marital status:    Tobacco Use    Smoking status: Never     Passive exposure: Never    Smokeless tobacco: Never   Vaping Use    Vaping status: Never Used   Substance and Sexual Activity    Alcohol use: Never    Drug use: Never    Sexual activity: Defer           Objective   Physical Exam  Vitals and nursing note reviewed.   Constitutional:       General: She is not in acute distress.     Appearance: Normal appearance. She is well-developed. She is not toxic-appearing or diaphoretic.      Comments: Elderly and chronically ill-appearing female, awake and alert, well-oriented, does not appear to be in acute distress.   HENT:      Head: Normocephalic and atraumatic.   Eyes:      General: No scleral icterus.     Pupils: Pupils are equal, round, and reactive to light.   Neck:      Trachea: No tracheal deviation.   Cardiovascular:      Rate and Rhythm: Normal rate and regular rhythm.   Pulmonary:      Effort: Pulmonary effort is normal. No respiratory distress.      Comments: Respirations are unlabored.  Air movement is good.  Crackles are heard in all fields consistent with pulmonary fibrosis.  Chest:      Chest wall: No tenderness.   Abdominal:      General: Bowel sounds are normal.      Palpations: Abdomen is soft.      Tenderness: There is no abdominal tenderness. There is no guarding or rebound.   Musculoskeletal:         General: No tenderness. Normal range of motion.      Cervical back: Normal range of motion and neck supple. No rigidity or tenderness.      Right lower leg: No tenderness. No edema.      Left lower  leg: No tenderness. No edema.   Skin:     General: Skin is warm and dry.      Capillary Refill: Capillary refill takes less than 2 seconds.      Coloration: Skin is not pale.   Neurological:      General: No focal deficit present.      Mental Status: She is alert and oriented to person, place, and time.      GCS: GCS eye subscore is 4. GCS verbal subscore is 5. GCS motor subscore is 6.      Motor: No abnormal muscle tone.   Psychiatric:         Mood and Affect: Mood normal.         Behavior: Behavior normal.         Procedures  CT Angiogram Chest Pulmonary Embolism   Final Result   1.  No pulmonary embolus.   2.  Diffuse interstitial thickening with groundglass opacities, worse in   the lung bases which may reflect pulmonary edema, atypical pneumonia or   NSIP fibrosis.  Associated mild distal bronchiectasis bilaterally.   3.  Mediastinal adenopathy measuring in short axis up to 1.3 cm about   the left pulmonary arteries and 1.5 cm about the right paratracheal   space.  Prominent bilateral hilar lymph nodes.           This report was finalized on 2/1/2025 11:43 PM by Roge Ordonez MD.          XR Chest 1 View   Final Result       Mild enlarged heart size   Hypoinflated lungs.   Coarsened bronchovascular pattern to the lungs with possible   contribution from underlying chronic interstitial lung disease   No pleural effusion or pneumothorax           This report was finalized on 2/1/2025 9:23 PM by Skyler Castellanos MD.            Results for orders placed or performed during the hospital encounter of 02/01/25   ECG 12 Lead ED Triage Standing Order; Chest Pain    Collection Time: 02/01/25  8:10 PM   Result Value Ref Range    QT Interval 370 ms    QTC Interval 437 ms   Comprehensive Metabolic Panel    Collection Time: 02/01/25  8:25 PM    Specimen: Arm, Right; Blood   Result Value Ref Range    Glucose 160 (H) 65 - 99 mg/dL    BUN 16 8 - 23 mg/dL    Creatinine 0.89 0.57 - 1.00 mg/dL    Sodium 140 136 - 145 mmol/L     Potassium 4.3 3.5 - 5.2 mmol/L    Chloride 104 98 - 107 mmol/L    CO2 25.5 22.0 - 29.0 mmol/L    Calcium 9.6 8.6 - 10.5 mg/dL    Total Protein 7.6 6.0 - 8.5 g/dL    Albumin 3.9 3.5 - 5.2 g/dL    ALT (SGPT) 13 1 - 33 U/L    AST (SGOT) 19 1 - 32 U/L    Alkaline Phosphatase 92 39 - 117 U/L    Total Bilirubin 0.2 0.0 - 1.2 mg/dL    Globulin 3.7 gm/dL    A/G Ratio 1.1 g/dL    BUN/Creatinine Ratio 18.0 7.0 - 25.0    Anion Gap 10.5 5.0 - 15.0 mmol/L    eGFR 66.9 >60.0 mL/min/1.73   High Sensitivity Troponin T    Collection Time: 02/01/25  8:25 PM    Specimen: Arm, Right; Blood   Result Value Ref Range    HS Troponin T 14 (H) <14 ng/L   CBC Auto Differential    Collection Time: 02/01/25  8:25 PM    Specimen: Arm, Right; Blood   Result Value Ref Range    WBC 11.78 (H) 3.40 - 10.80 10*3/mm3    RBC 4.79 3.77 - 5.28 10*6/mm3    Hemoglobin 13.9 12.0 - 15.9 g/dL    Hematocrit 43.7 34.0 - 46.6 %    MCV 91.2 79.0 - 97.0 fL    MCH 29.0 26.6 - 33.0 pg    MCHC 31.8 31.5 - 35.7 g/dL    RDW 14.1 12.3 - 15.4 %    RDW-SD 47.8 37.0 - 54.0 fl    MPV 9.1 6.0 - 12.0 fL    Platelets 397 140 - 450 10*3/mm3    Neutrophil % 73.7 42.7 - 76.0 %    Lymphocyte % 21.6 19.6 - 45.3 %    Monocyte % 2.8 (L) 5.0 - 12.0 %    Eosinophil % 1.1 0.3 - 6.2 %    Basophil % 0.5 0.0 - 1.5 %    Immature Grans % 0.3 0.0 - 0.5 %    Neutrophils, Absolute 8.67 (H) 1.70 - 7.00 10*3/mm3    Lymphocytes, Absolute 2.55 0.70 - 3.10 10*3/mm3    Monocytes, Absolute 0.33 0.10 - 0.90 10*3/mm3    Eosinophils, Absolute 0.13 0.00 - 0.40 10*3/mm3    Basophils, Absolute 0.06 0.00 - 0.20 10*3/mm3    Immature Grans, Absolute 0.04 0.00 - 0.05 10*3/mm3    nRBC 0.0 0.0 - 0.2 /100 WBC   BNP    Collection Time: 02/01/25  8:25 PM    Specimen: Arm, Right; Blood   Result Value Ref Range    proBNP 203.1 0.0 - 1,800.0 pg/mL   Green Top (Gel)    Collection Time: 02/01/25  8:25 PM   Result Value Ref Range    Extra Tube Hold for add-ons.    Lavender Top    Collection Time: 02/01/25  8:25 PM   Result  Value Ref Range    Extra Tube hold for add-on    Gold Top - SST    Collection Time: 02/01/25  8:25 PM   Result Value Ref Range    Extra Tube Hold for add-ons.    Light Blue Top    Collection Time: 02/01/25  8:25 PM   Result Value Ref Range    Extra Tube Hold for add-ons.    ECG 12 Lead Chest Pain    Collection Time: 02/01/25  9:43 PM   Result Value Ref Range    QT Interval 394 ms    QTC Interval 434 ms   Respiratory Panel PCR w/COVID-19(SARS-CoV-2) KORY/LESIA/TAWNY/PAD/COR/ENID In-House, NP Swab in UTM/VTM, 2 HR TAT - Swab, Nasopharynx    Collection Time: 02/01/25  9:53 PM    Specimen: Nasopharynx; Swab   Result Value Ref Range    ADENOVIRUS, PCR Not Detected Not Detected    Coronavirus 229E Not Detected Not Detected    Coronavirus HKU1 Not Detected Not Detected    Coronavirus NL63 Not Detected Not Detected    Coronavirus OC43 Not Detected Not Detected    COVID19 Not Detected Not Detected - Ref. Range    Human Metapneumovirus Not Detected Not Detected    Human Rhinovirus/Enterovirus Not Detected Not Detected    Influenza A PCR Not Detected Not Detected    Influenza B PCR Not Detected Not Detected    Parainfluenza Virus 1 Not Detected Not Detected    Parainfluenza Virus 2 Not Detected Not Detected    Parainfluenza Virus 3 Not Detected Not Detected    Parainfluenza Virus 4 Not Detected Not Detected    RSV, PCR Not Detected Not Detected    Bordetella pertussis pcr Not Detected Not Detected    Bordetella parapertussis PCR Not Detected Not Detected    Chlamydophila pneumoniae PCR Not Detected Not Detected    Mycoplasma pneumo by PCR Not Detected Not Detected   High Sensitivity Troponin T 1Hr    Collection Time: 02/01/25 10:03 PM    Specimen: Blood   Result Value Ref Range    HS Troponin T 13 <14 ng/L    Troponin T Numeric Delta -1 ng/L    Troponin T % Delta -7 Abnormal if >/= 20%                ED Course  ED Course as of 02/02/25 0143   Sat Feb 01, 2025 2012 ECG 20:10 NSR, rate 84. Inferior and anterolat infarctions of  undetermined age. QT/qTc 370/437 [PAKO]   2120 Echocardiogram from 10/1/2023:    Interpretation Summary       ·  Normal left ventricular cavity size and wall thickness noted. All left ventricular wall segments contract normally.  ·  Left ventricular ejection fraction appears to be 61 - 65%.  ·  The aortic valve is structurally normal with no stenosis present. Trace aortic valve regurgitation is present.  ·  The mitral valve is structurally normal with no significant stenosis present. Mild mitral valve regurgitation is present.  ·  . There is no evidence of pericardial effusion. .   [CM]   2313 EKG at 2010 was rather poor quality study secondary to much baseline artifact.  Sinus rhythm, rate 84.  ME interval 182, QRS duration 74, QTc 437 ms.  Possible inferior and anterior infarcts, age indeterminant.  No evidence for STEMI.  Repeat EKG at 2143 shows sinus rhythm, rate 73.  ME interval 178, QRS duration 82, QTc 434 ms.  Baseline artifact.  No apparent acute ischemia.  No evidence for STEMI. [CM]   2346 Currently on the monitor sinus rhythm at 76,, respirations 16, pulse ox 99%. [CM]   Sun Feb 02, 2025   0122 Patient doing well, has been resting comfortably in no distress.  Patient, her  and I discussed all of her test results and her plan of care.  They voiced understanding and agreement. [CM]      ED Course User Index  [CM] Kenneth Driscoll MD  [PAKO] Arian Daley MD                                                       Medical Decision Making  Amount and/or Complexity of Data Reviewed  Labs: ordered.  Radiology: ordered.  ECG/medicine tests: ordered.    Risk  OTC drugs.  Prescription drug management.        Final diagnoses:   Pulmonary fibrosis   COPD exacerbation       ED Disposition  ED Disposition       ED Disposition   Discharge    Condition   Stable    Comment   --               Marisa Arias, APRN  [address]  [address] [address]  [phone]    Go to   2 to 3 days    Your lung  specialist in [place]    Go to   At the first available appointment    [facility] EMERGENCY DEPART[facility]  [address] [facility] [address] [phone]  Go to   If symptoms worsen         Medication List        New Prescriptions      doxycycline 100 MG capsule  Commonly known as: MONODOX  Take 1 capsule by mouth Every 12 (Twelve) Hours for 10 days.     ipratropium-albuterol 0.5-2.5 mg/3 ml nebulizer  Commonly known as: DUO-NEB  Take 3 mL by nebulization Every 4 (Four) Hours As Needed for Wheezing.     methylPREDNISolone 4 MG dose pack  Commonly known as: MEDROL  Take as directed on package instructions.     nitroglycerin 0.4 MG SL tablet  Commonly known as: NITROSTAT  Place 1 tablet under the tongue 2 (Two) Times a Day As Needed (Esophageal spasm).               Where to Get Your Medications        These medications were sent to [facility] [address] [place] [address] - [phone]  - [phone] [address] [address]      Phone: [phone]   doxycycline 100 MG capsule  ipratropium-albuterol 0.5-2.5 mg/3 ml nebulizer  methylPREDNISolone 4 MG dose pack  nitroglycerin 0.4 MG SL tablet       Please note that portions of this note were completed with a voice recognition program.        Kenneth Driscoll MD  02/02/25 0144

## 2025-02-02 NOTE — TELEPHONE ENCOUNTER
"Caller was seen in the ED last night and cannot get in to [facility] for her labs.  Transferred to [facility] assistance.  Reason for Disposition  • Health Information question, no triage required and triager able to answer question    Additional Information  • Negative: [1] Caller is not with the adult (patient) AND [2] reporting urgent symptoms  • Negative: Lab result questions  • Negative: Medication questions  • Negative: Caller can't be reached by phone  • Negative: Caller has already spoken to PCP or another triager  • Negative: RN needs further essential information from caller in order to complete triage  • Negative: Requesting regular office appointment  • Negative: [1] Caller requesting NON-URGENT health information AND [2] PCP's office is the best resource  • Negative: General information question, no triage required and triager able to answer question  • Negative: Question about upcoming scheduled test, no triage required and triager able to answer question    Answer Assessment - Initial Assessment Questions  1. REASON FOR CALL or QUESTION: \"What is your reason for calling today?\" or \"How can I best help you?\" or \"What question do you have that I can help answer?\"      I cannot get in to [facility] to see my labs.    Protocols used: Information Only Call - No Triage-ADULT-AH    "

## 2025-02-03 ENCOUNTER — OFFICE VISIT (OUTPATIENT)
Dept: GASTROENTEROLOGY | Facility: CLINIC | Age: 80
End: 2025-02-03
Payer: MEDICARE

## 2025-02-03 VITALS
SYSTOLIC BLOOD PRESSURE: 179 MMHG | WEIGHT: 161 LBS | HEART RATE: 84 BPM | HEIGHT: 62 IN | DIASTOLIC BLOOD PRESSURE: 73 MMHG | BODY MASS INDEX: 29.63 KG/M2

## 2025-02-03 DIAGNOSIS — K21.9 GASTROESOPHAGEAL REFLUX DISEASE, UNSPECIFIED WHETHER ESOPHAGITIS PRESENT: ICD-10-CM

## 2025-02-03 DIAGNOSIS — R13.19 ESOPHAGEAL DYSPHAGIA: ICD-10-CM

## 2025-02-03 DIAGNOSIS — K22.4 ESOPHAGEAL DYSMOTILITY: ICD-10-CM

## 2025-02-03 DIAGNOSIS — K59.04 CHRONIC IDIOPATHIC CONSTIPATION: Primary | ICD-10-CM

## 2025-02-03 PROCEDURE — 3078F DIAST BP <80 MM HG: CPT | Performed by: NURSE PRACTITIONER

## 2025-02-03 PROCEDURE — 3077F SYST BP >= 140 MM HG: CPT | Performed by: NURSE PRACTITIONER

## 2025-02-03 PROCEDURE — 1159F MED LIST DOCD IN RCRD: CPT | Performed by: NURSE PRACTITIONER

## 2025-02-03 PROCEDURE — 1160F RVW MEDS BY RX/DR IN RCRD: CPT | Performed by: NURSE PRACTITIONER

## 2025-02-03 PROCEDURE — 99214 OFFICE O/P EST MOD 30 MIN: CPT | Performed by: NURSE PRACTITIONER

## 2025-02-03 RX ORDER — OMEPRAZOLE 40 MG/1
40 CAPSULE, DELAYED RELEASE ORAL 2 TIMES DAILY
Qty: 60 CAPSULE | Refills: 3 | Status: SHIPPED | OUTPATIENT
Start: 2025-02-03

## 2025-02-03 RX ORDER — METHYLPREDNISOLONE 4 MG/1
4 TABLET ORAL DAILY
COMMUNITY
End: 2025-02-03

## 2025-02-03 RX ORDER — DOXYCYCLINE HYCLATE 100 MG
100 TABLET ORAL 2 TIMES DAILY
COMMUNITY

## 2025-02-03 RX ORDER — FLUOXETINE 10 MG/1
10 CAPSULE ORAL DAILY
COMMUNITY

## 2025-02-03 NOTE — PROGRESS NOTES
DATE :  2/3/2025    REASON FOR FOLLOW UP: GERD    REFERRING PHYSICIAN:  Saul Tripp*     CHIEF COMPLAINT:  Follow up of EGD    HISTORY OF PRESENT ILLNESS:   Gregoria Pendleton is a very pleasant 79 y.o. female who is being seen today at the request of  Saul Tripp*  for evaluation and treatment of GERD. Ms. Pendleton reports GERD has been a chronic issue.  Of note, she retains her gallbladder.  She reports she has been taking omeprazole 40 mg PO daily for several years.  At present, she reports having ~2-3 flares per week with burning in her throat/chest and regurgitation. She has associated nausea without vomiting. Denies epigastric pain.  She admits to drinking 2-3, 12 ounce bottles of Pepsi per day.  Her weight has been stable.She also reports chronic difficulty with dysphagia particularly with pills and liquids.  She reports pills will often get hung in her upper esophagus.  At times, she will also get choked on her saliva.  Of note, she has history of CVA in 2021.  She has chronic difficulty with constipation as well.  She has previously tried over-the-counter stool softeners/laxatives without improvement.  She was provided with Rx for Linzess 145 mcg p.o. daily, likely from her PCP which she says she takes as needed (every couple of weeks).  Patient reports she has not taking Linzess daily as prescribed due to fear of diarrhea with this medication.  At present, she reports her bowels move every 2 weeks with stool type V on [place] stool scale.  She has associated abdominal bloating and incomplete evacuation of her colon.  She denies having melena or rectal bleeding.  She reports having previous colonoscopy>20 years ago which was unremarkable.  She denies family history of colon cancer.  She has no other complaints today.    INTERVAL HISTORY:  Ms. Pendleton presents today for follow up.  Since her last visit, she underwent esophagram on 11/21/2024 which showed mild narrowing at the GE junction  with delayed passage of the 13 mm barium tablet.  The tablet did pass with 3 repeated swallows.  EGD was recommended to further evaluate which was completed by Dr. Gage on 12/12/2024.  Dilation was performed to 20 mm.  Biopsies of the esophagus revealed mild chronic esophagitis from acid reflux.  Also noted abnormal motility in the lower third of the esophagus.  Also noted small hiatal hernia.  Stomach and duodenum both appeared normal. Following EGD, she was continued on omeprazole 40 mg PO twice daily which is controlling GERD well at present. Happily, dysphagia has resolved.  In regards to constipation, this has improved with Linzess 145 mcg which she takes ~4 times per week.  Patient reports she does not take this medication if she is planning to be out of her house.  With Linzess, she is having a BM ~4 times per week with stool type IV-VII on [place] stool scale.  She feels as if she is evacuating her colon well.  She was recently seen in the ED for complaints of increased shortness of breath.  She was treated for COPD exacerbation and is currently taking doxycycline, Medrol Dosepak and given albuterol neb every 4 hours as needed.  She is on continuous supplemental oxygen and follows with pulmonology.  Patient was also given Rx for nitroglycerin as needed for chest pain.  Patient reports following cardiology and is scheduled for stress test next week.  She has no other complaints today.    PAST MEDICAL HISTORY:  Past Medical History:   Diagnosis Date    Arthritis     Disease of thyroid gland     Elevated cholesterol     GERD (gastroesophageal reflux disease)     Hyperlipidemia     Hypertension     Migraine     Pulmonary fibrosis     Stroke        PAST SURGICAL HISTORY:  Past Surgical History:   Procedure Laterality Date    CYSTOSCOPY W/ LASER LITHOTRIPSY  01/15/2024    ENDOSCOPY N/A 12/12/2024    Procedure: ESOPHAGOGASTRODUODENOSCOPY WITH BIOPSY;  Surgeon: Jaky Goddard MD;  Location: [facility] OR;   Service: Gastroenterology;  Laterality: N/A;  dilated to 20mm    HYSTERECTOMY  age 38    TUBAL ABDOMINAL LIGATION Bilateral        FAMILY HISTORY:  Family History   Problem Relation Age of Onset    Heart failure Father     Heart disease Sister     Heart attack Sister     Breast cancer Neg Hx        SOCIAL HISTORY:  Social History     Socioeconomic History    Marital status:    Tobacco Use    Smoking status: Never     Passive exposure: Never    Smokeless tobacco: Never   Vaping Use    Vaping status: Never Used   Substance and Sexual Activity    Alcohol use: Never    Drug use: Never    Sexual activity: Defer     MEDICATIONS:  The current medication list was reviewed in the EMR    Current Outpatient Medications:     aspirin 81 MG chewable tablet, Chew 1 tablet Daily., Disp: , Rfl:     atenolol (TENORMIN) 25 MG tablet, Take 1 tablet by mouth Daily., Disp: , Rfl:     cetirizine (zyrTEC) 10 MG tablet, Take 1 tablet by mouth Daily., Disp: , Rfl:     cloNIDine (CATAPRES) 0.2 MG tablet, 1 tablet 2 (Two) Times a Day., Disp: , Rfl:     doxycycline (MONODOX) 100 MG capsule, Take 1 capsule by mouth Every 12 (Twelve) Hours for 10 days., Disp: 19 capsule, Rfl: 0    doxycycline (VIBRAMYICN) 100 MG tablet, Take 1 tablet by mouth 2 (Two) Times a Day., Disp: , Rfl:     DULoxetine (CYMBALTA) 60 MG capsule, Take 1 capsule by mouth Daily., Disp: , Rfl:     Emgality 120 MG/ML auto-injector pen, , Disp: , Rfl:     estradiol (ESTRACE) 1 MG tablet, Take 1 tablet by mouth Daily., Disp: , Rfl:     FLUoxetine (PROzac) 10 MG capsule, Take 1 capsule by mouth Daily., Disp: , Rfl:     ipratropium-albuterol (DUO-NEB) 0.5-2.5 mg/3 ml nebulizer, Take 3 mL by nebulization Every 4 (Four) Hours As Needed for Wheezing., Disp: 360 mL, Rfl: 0    levothyroxine (SYNTHROID, LEVOTHROID) 100 MCG tablet, Take 75 mcg by mouth Daily., Disp: , Rfl:     Linzess 145 MCG capsule capsule, , Disp: , Rfl:     lovastatin (MEVACOR) 20 MG tablet, , Disp: , Rfl:      "methylPREDNISolone (MEDROL) 4 MG dose pack, Take as directed on package instructions., Disp: 21 tablet, Rfl: 0    neomycin-polymyxin-dexamethamethasone (POLYDEX) 3.5-42920-8.1 ointment ophthalmic ointment, , Disp: , Rfl:     nitroglycerin (NITROSTAT) 0.4 MG SL tablet, Place 1 tablet under the tongue 2 (Two) Times a Day As Needed (Esophageal spasm)., Disp: 25 tablet, Rfl: 0    nystatin (MYCOSTATIN) 100,000 unit/mL suspension, Take 5 mL by mouth 4 (Four) Times a Day., Disp: 280 mL, Rfl: 0    olmesartan (Benicar) 40 MG tablet, Take 1 tablet by mouth Daily., Disp: 30 tablet, Rfl: 2    omega-3 acid ethyl esters (LOVAZA) 1 g capsule, , Disp: , Rfl:     omeprazole (priLOSEC) 40 MG capsule, Take 1 capsule by mouth 2 (Two) Times a Day., Disp: 60 capsule, Rfl: 3    ondansetron (Zofran) 4 MG tablet, Take 1 tablet by mouth Every 8 (Eight) Hours As Needed for Nausea or Vomiting., Disp: 30 tablet, Rfl: 4    rOPINIRole (REQUIP) 0.5 MG tablet, , Disp: , Rfl:     ALLERGIES:    Allergies   Allergen Reactions    Ampicillin Diarrhea    Clonidine Other (See Comments)     Patches cause chemical burns       REVIEW OF SYSTEMS:    A comprehensive 14 point review of systems was performed.  Significant findings as mentioned above.  All other systems reviewed and are negative.      Physical Exam   Vital Signs: /73 (BP Location: Left arm, Patient Position: Sitting, Cuff Size: Large Adult)   Pulse 84   Ht 157.5 cm (62\")   Wt 73 kg (161 lb)   BMI 29.45 kg/m²    General: Well developed, well nourished, alert and oriented x 3, in no acute distress.   Head: ATNC   Eyes: PERRL, No evidence of conjunctivitis.   Nose: No nasal discharge.   Mouth: Oral mucosal membranes moist. No oral ulceration or hemorrhages.   Neck: Neck supple. No thyromegaly. No JVD.   Lungs: Clear in all fields to A&P without rales, rhonchi or wheezing.   Heart: Regular rate and rhythm. No murmurs, rubs, or gallops.   Abdomen: Soft. Bowel sounds are normoactive. " Nontender with palpation.   Neurologic: Grossly non-focal exam    RECENT LABS:  Lab Results   Component Value Date    WBC 11.78 (H) 02/01/2025    HGB 13.9 02/01/2025    HCT 43.7 02/01/2025    MCV 91.2 02/01/2025    RDW 14.1 02/01/2025     02/01/2025    NEUTRORELPCT 73.7 02/01/2025    LYMPHORELPCT 21.6 02/01/2025    MONORELPCT 2.8 (L) 02/01/2025    EOSRELPCT 1.1 02/01/2025    BASORELPCT 0.5 02/01/2025    NEUTROABS 8.67 (H) 02/01/2025    LYMPHSABS 2.55 02/01/2025       Lab Results   Component Value Date     02/01/2025    K 4.3 02/01/2025    CO2 25.5 02/01/2025     02/01/2025    BUN 16 02/01/2025    CREATININE 0.89 02/01/2025    EGFRIFNONA 66 02/12/2021    GLUCOSE 160 (H) 02/01/2025    CALCIUM 9.6 02/01/2025    ALKPHOS 92 02/01/2025    AST 19 02/01/2025    ALT 13 02/01/2025    BILITOT 0.2 02/01/2025    ALBUMIN 3.9 02/01/2025    PROTEINTOT 7.6 02/01/2025     ASSESSMENT & PLAN:  Gregoria Pendleton is a very pleasant 79 y.o. female with    1.  GERD:  2.  Dysphagia (now resolved):  3.  Esophageal dysmotility:    -She underwent esophagram on 11/21/2024 which showed mild narrowing at the GE junction with delayed passage of the 13 mm barium tablet.  The tablet did pass with 3 repeated swallows.  EGD was recommended to further evaluate which was completed by Dr. Gage on 12/12/2024.  Dilation was performed to 20 mm.  Biopsies of the esophagus revealed mild chronic esophagitis from acid reflux.  Also noted abnormal motility in the lower third of the esophagus.  Also noted small hiatal hernia.  Stomach and duodenum both appeared normal.  We reviewed findings today.    -Following EGD, she was continued on omeprazole 40 mg PO twice daily which is controlling GERD well at present. Happily, dysphagia has resolved.  Will continue omeprazole.  Refills provided.  -We discussed important dietary modifications, limiting triggers such as caffeine, chocolate, spicy foods, acidic foods, fried/greasy foods, food with high  fat content and carbonated beverages.  Also recommended she chew her food thoroughly before swallowing, alternate solids with liquids while eating, eat small, frequent meals and to avoid cold liquids to help with esophageal dysmotility.    4.  Constipation:  -This has been a chronic issue not previously improved with over-the-counter stool softeners/laxatives.  Continue Linzess 145 mcg p.o. daily which is controlling constipation well.  Will monitor.      Will have patient return to clinic in 4 months for symptom check.    The patient was in agreement with the plan and all questions were answered to her satisfaction.     Thank you so much for allowing us to participate in the care of Gregoria Pendleton . Please do not hesitate to contact us with any questions or concerns.             Electronically Signed by: SHAD Carrillo , February 3, 2025 10:28 EST       CC:   No ref. provider found  Marisa Arias APRN

## 2025-02-06 ENCOUNTER — HOSPITAL ENCOUNTER (OUTPATIENT)
Dept: NUCLEAR MEDICINE | Facility: HOSPITAL | Age: 80
Discharge: HOME | End: 2025-02-06
Payer: MEDICARE

## 2025-02-06 ENCOUNTER — HOSPITAL ENCOUNTER (OUTPATIENT)
Dept: CARDIOLOGY | Facility: HOSPITAL | Age: 80
Discharge: HOME | End: 2025-02-06
Payer: MEDICARE

## 2025-02-06 DIAGNOSIS — I20.89 ANGINAL EQUIVALENT: ICD-10-CM

## 2025-02-06 LAB
BH CV NUCLEAR PRIOR STUDY: 3
BH CV REST NUCLEAR ISOTOPE DOSE: 10.8 MCI
BH CV STRESS BP STAGE 1: NORMAL
BH CV STRESS COMMENTS STAGE 1: NORMAL
BH CV STRESS DOSE REGADENOSON STAGE 1: 0.4
BH CV STRESS DURATION MIN STAGE 1: 0
BH CV STRESS DURATION SEC STAGE 1: 10
BH CV STRESS HR STAGE 1: 91
BH CV STRESS NUCLEAR ISOTOPE DOSE: 32.1 MCI
BH CV STRESS PROTOCOL 1: NORMAL
BH CV STRESS RECOVERY BP: NORMAL MMHG
BH CV STRESS RECOVERY HR: 88 BPM
BH CV STRESS STAGE 1: 1
MAXIMAL PREDICTED HEART RATE: 143 BPM
PERCENT MAX PREDICTED HR: 63.64 %
SPECT HRT GATED+EF W RNC IV: 90 %
STRESS BASELINE BP: NORMAL MMHG
STRESS BASELINE HR: 65 BPM
STRESS PERCENT HR: 75 %
STRESS POST PEAK BP: NORMAL MMHG
STRESS POST PEAK HR: 91 BPM
STRESS TARGET HR: 122 BPM

## 2025-02-06 PROCEDURE — 34310000005 TECHNETIUM SESTAMIBI: Performed by: PHYSICIAN ASSISTANT

## 2025-02-06 PROCEDURE — A9500 TC99M SESTAMIBI: HCPCS | Performed by: PHYSICIAN ASSISTANT

## 2025-02-06 PROCEDURE — 78452 HT MUSCLE IMAGE SPECT MULT: CPT

## 2025-02-06 PROCEDURE — 25010000002 REGADENOSON 0.4 MG/5ML SOLUTION: Performed by: PHYSICIAN ASSISTANT

## 2025-02-06 PROCEDURE — 93017 CV STRESS TEST TRACING ONLY: CPT

## 2025-02-06 RX ORDER — REGADENOSON 0.08 MG/ML
0.4 INJECTION, SOLUTION INTRAVENOUS
Status: COMPLETED | OUTPATIENT
Start: 2025-02-06 | End: 2025-02-06

## 2025-02-06 RX ADMIN — TECHNETIUM TC 99M SESTAMIBI 1 DOSE: 1 INJECTION INTRAVENOUS at 08:33

## 2025-02-06 RX ADMIN — TECHNETIUM TC 99M SESTAMIBI 1 DOSE: 1 INJECTION INTRAVENOUS at 09:43

## 2025-02-06 RX ADMIN — REGADENOSON 0.4 MG: 0.08 INJECTION, SOLUTION INTRAVENOUS at 08:52

## 2025-02-07 ENCOUNTER — OFFICE VISIT (OUTPATIENT)
Dept: CARDIOLOGY | Facility: CLINIC | Age: 80
End: 2025-02-07
Payer: MEDICARE

## 2025-02-07 VITALS
BODY MASS INDEX: 30.07 KG/M2 | WEIGHT: 163.4 LBS | HEART RATE: 79 BPM | OXYGEN SATURATION: 93 % | SYSTOLIC BLOOD PRESSURE: 147 MMHG | HEIGHT: 62 IN | DIASTOLIC BLOOD PRESSURE: 71 MMHG

## 2025-02-07 DIAGNOSIS — R06.09 DYSPNEA ON EXERTION: Primary | ICD-10-CM

## 2025-02-07 RX ORDER — ISOSORBIDE MONONITRATE 30 MG/1
30 TABLET, EXTENDED RELEASE ORAL DAILY
Qty: 30 TABLET | Refills: 11 | Status: SHIPPED | OUTPATIENT
Start: 2025-02-07

## 2025-02-07 NOTE — PROGRESS NOTES
Marisa Arias APRN  Gregoria Pendleton  1945 02/07/2025    Patient Active Problem List   Diagnosis    Pulmonary fibrosis    Essential hypertension    Cryptogenic stroke    Esophageal dysphagia    Gastroesophageal reflux disease       Dear Marisa Arias APRN:    Subjective     History of Present Illness:    Chief Complaint   Patient presents with    Results     STRESS TEST       Gregoria Pendleton is a pleasant 79 y.o. female with a past medical history significant for  interstitial lung disease, occipital CVA of unknown etiology in 2021, diabetes mellitus, essential hypertension, dyslipidemia.  She comes in today for cardiology follow-up.    Gregoria comes in today since I last saw her I did stop multiple antihypertensives due to severe orthostatic hypotension.  Since this change she has not seemed to notice a significant change in her lightheadedness and feels like she is going to fall when she stands up and starts walking.  She does still endorse some chest pains that she describes as a burning sensation that can last upwards of 30 minutes in duration.  She was seen in the emergency room where she was prescribed nitroglycerin and she has been taking at least a couple times a day and does get immediate relief.  Stress test did show small size moderate degree of ischemia on the lateral wall.      Allergies   Allergen Reactions    Ampicillin Diarrhea    Clonidine Other (See Comments)     Patches cause chemical burns   :      Current Outpatient Medications:     aspirin 81 MG chewable tablet, Chew 1 tablet Daily., Disp: , Rfl:     atenolol (TENORMIN) 25 MG tablet, Take 1 tablet by mouth Daily., Disp: , Rfl:     cetirizine (zyrTEC) 10 MG tablet, Take 1 tablet by mouth Daily., Disp: , Rfl:     cloNIDine (CATAPRES) 0.2 MG tablet, 1 tablet 2 (Two) Times a Day., Disp: , Rfl:     doxycycline (MONODOX) 100 MG capsule, Take 1 capsule by mouth Every 12 (Twelve) Hours for 10 days., Disp: 19 capsule, Rfl: 0     doxycycline (VIBRAMYICN) 100 MG tablet, Take 1 tablet by mouth 2 (Two) Times a Day., Disp: , Rfl:     DULoxetine (CYMBALTA) 60 MG capsule, Take 1 capsule by mouth Daily., Disp: , Rfl:     Emgality 120 MG/ML auto-injector pen, , Disp: , Rfl:     estradiol (ESTRACE) 1 MG tablet, Take 1 tablet by mouth Daily., Disp: , Rfl:     FLUoxetine (PROzac) 10 MG capsule, Take 1 capsule by mouth Daily., Disp: , Rfl:     ipratropium-albuterol (DUO-NEB) 0.5-2.5 mg/3 ml nebulizer, Take 3 mL by nebulization Every 4 (Four) Hours As Needed for Wheezing., Disp: 360 mL, Rfl: 0    levothyroxine (SYNTHROID, LEVOTHROID) 100 MCG tablet, Take 75 mcg by mouth Daily., Disp: , Rfl:     Linzess 145 MCG capsule capsule, , Disp: , Rfl:     lovastatin (MEVACOR) 20 MG tablet, , Disp: , Rfl:     methylPREDNISolone (MEDROL) 4 MG dose pack, Take as directed on package instructions., Disp: 21 tablet, Rfl: 0    neomycin-polymyxin-dexamethamethasone (POLYDEX) 3.5-41222-9.1 ointment ophthalmic ointment, , Disp: , Rfl:     nitroglycerin (NITROSTAT) 0.4 MG SL tablet, Place 1 tablet under the tongue 2 (Two) Times a Day As Needed (Esophageal spasm)., Disp: 25 tablet, Rfl: 0    nystatin (MYCOSTATIN) 100,000 unit/mL suspension, Take 5 mL by mouth 4 (Four) Times a Day., Disp: 280 mL, Rfl: 0    olmesartan (Benicar) 40 MG tablet, Take 1 tablet by mouth Daily., Disp: 30 tablet, Rfl: 2    omega-3 acid ethyl esters (LOVAZA) 1 g capsule, , Disp: , Rfl:     omeprazole (priLOSEC) 40 MG capsule, Take 1 capsule by mouth 2 (Two) Times a Day., Disp: 60 capsule, Rfl: 3    ondansetron (Zofran) 4 MG tablet, Take 1 tablet by mouth Every 8 (Eight) Hours As Needed for Nausea or Vomiting., Disp: 30 tablet, Rfl: 4    rOPINIRole (REQUIP) 0.5 MG tablet, , Disp: , Rfl:     isosorbide mononitrate (IMDUR) 30 MG 24 hr tablet, Take 1 tablet by mouth Daily., Disp: 30 tablet, Rfl: 11    The following portions of the patient's history were reviewed and updated as appropriate: allergies,  "current medications, past family history, past medical history, past social history, past surgical history and problem list.    Social History     Tobacco Use    Smoking status: Never     Passive exposure: Never    Smokeless tobacco: Never   Vaping Use    Vaping status: Never Used   Substance Use Topics    Alcohol use: Never    Drug use: Never         Objective   Vitals:    02/07/25 0950   BP: 147/71   Pulse: 79   SpO2: 93%   Weight: 74.1 kg (163 lb 6.4 oz)   Height: 157.5 cm (62\")     Body mass index is 29.89 kg/m².    ROS    Physical Exam    Lab Results   Component Value Date     02/01/2025    K 4.3 02/01/2025     02/01/2025    CO2 25.5 02/01/2025    BUN 16 02/01/2025    CREATININE 0.89 02/01/2025    GLUCOSE 160 (H) 02/01/2025    CALCIUM 9.6 02/01/2025    AST 19 02/01/2025    ALT 13 02/01/2025    ALKPHOS 92 02/01/2025     No results found for: \"CKTOTAL\"  Lab Results   Component Value Date    WBC 11.78 (H) 02/01/2025    HGB 13.9 02/01/2025    HCT 43.7 02/01/2025     02/01/2025     Lab Results   Component Value Date    INR 0.95 12/26/2024     No results found for: \"MG\"  Lab Results   Component Value Date    TSH 1.410 02/12/2021      No results found for: \"BNP\"    During this visit the following were done:  Labs Reviewed []    Labs Ordered []    Radiology Reports Reviewed []    Radiology Ordered []    PCP Records Reviewed []    Referring Provider Records Reviewed []    ER Records Reviewed []    Hospital Records Reviewed []    History Obtained From Family []    Radiology Images Reviewed []    Other Reviewed []    Records Requested []       Procedures    Assessment & Plan    Diagnosis Plan   1. Dyspnea on exertion (NYHA class III)                 Recommendations:  Essential hypertension  Now above goal however still having orthostasis.  Upon further inquiry she actually is taking Flomax which is not on her medication list I did explain to her that this could be causing all of her orthostatic " hypotension and asked her to hold this.  If this resolves her orthostatic hypotension I would like to restart spironolactone and amlodipine if necessary.  She expressed understanding  Asked her to reach back to our office in 1 week if orthostasis improves  Chest pains  She has been having in her chest which she has been attributing to esophageal spasms however in my opinion they sound more suspicious for underlying angina recent stress test was also abnormal concerning for lateral wall ischemia.  She does get relief of this chest burning with nitroglycerin.   I will try isosorbide mononitrate 30 mg  I did discuss possible further evaluation with left heart catheterization versus CT coronary angiogram.  She is adamant that she is not interested in any invasive procedure and would prefer medical management at this point given results of stress test I think this is reasonable to try initially.  Continue olmesartan    Return in about 4 weeks (around 3/7/2025).    As always, I appreciate very much the opportunity to participate in the cardiovascular care of your patients.      With Best Regards,    Ken Ordonez PA-C

## 2025-02-26 PROBLEM — J96.01 ACUTE HYPOXIC RESPIRATORY FAILURE: Status: ACTIVE | Noted: 2025-01-01

## 2025-02-26 NOTE — H&P
"    [facility] Medicine Services  History & Physical    Patient Identification:  Name:  Gregoria Pendleton  Age:  79 y.o.  Sex:  female  :  1945  MRN:  [id number]   Visit Number:  [id number]  Admit Date: 2025   Primary Care Physician:  Marisa Arias APRN    Subjective     Chief complaint: Shortness of breath    History of presenting illness:      Gregoria Pendleton is a 79 y.o. female who presented for further evaluation of shortness of breath. She was seen and examined in the ED with son and spouse at the bedside. She is ill appearing. She and spouse report she has been increasingly more short of breath for about the past week. She is having increased cough but denies much sputum production. She does wear 2L supplemental O2 at baseline. She has not had any fevers, chills. No known sick contacts. She does complain of rhinorrhea. No abdominal pain. She complains of constipation.   She has history of dysphagia- needs to have esophagus stretched periodically. For the past few weeks she has had more difficulty swallowing. Does not eat a modified diet at baseline, has occasional difficulty swallowing pills. Last night while eating dinner she \"strangled\" but after clearing her O2 sats normalized at home. She went on to bed and upon waking this morning they found her O2 saturation to be 33% on home pulse ox. Her  noted her to have some hemoptysis this morning as well and she did have one episode of vomiting. She denies recent epistaxis.   On further ROS she denied chest pain, palpitations, or increased lower extremity edema. See full ROS below.     Past medical history is significant for pulmonary fibrosis, Hx CVA, GERD, dysphagia, thyroid disease, HTN, HLD    Upon arrival to the ED, vital signs were temp 97.4, heart rate 107, respirations 35, SpO2 44% on nonrebreather, /78.  ABG obtained on 100% FiO2 nonrebreather showed pH normal, pCO2 normal, pO2 77.1.  Her initial HS " troponin was 28 with repeat at 33, proBNP is 3228.  White blood cell count is elevated at 21.42 with D-dimer 1.73, lactate 3.4 and Pro-Brooks normal.  CT PE protocol negative for PE-did note diffuse bilateral airspace pneumonia-dense consolidation concerning for alveolar hemorrhage.    Known Emergency Department medications received prior to my evaluation included Isovue, DuoNeb, 125 mg Solu-Medrol, Zosyn.   Emergency Department Room location at the time of my evaluation was 113.     ---------------------------------------------------------------------------------------------------------------------   Review of Systems   Constitutional:  Negative for chills and fever.   HENT:  Positive for rhinorrhea. Negative for congestion.    Respiratory:  Positive for cough, choking and shortness of breath.    Cardiovascular:  Negative for chest pain, palpitations and leg swelling.   Gastrointestinal:  Negative for abdominal pain, diarrhea and nausea.   Genitourinary:  Negative for difficulty urinating and dysuria.   Musculoskeletal:  Negative for arthralgias and myalgias.   Skin:  Negative for rash and wound.   Neurological:  Negative for dizziness and light-headedness.        ---------------------------------------------------------------------------------------------------------------------   Past Medical History:   Diagnosis Date    Arthritis     Disease of thyroid gland     Elevated cholesterol     GERD (gastroesophageal reflux disease)     Hyperlipidemia     Hypertension     Migraine     Pulmonary fibrosis     Stroke      Past Surgical History:   Procedure Laterality Date    CYSTOSCOPY W/ LASER LITHOTRIPSY  01/15/2024    ENDOSCOPY N/A 12/12/2024    Procedure: ESOPHAGOGASTRODUODENOSCOPY WITH BIOPSY;  Surgeon: Jaky Goddard MD;  Location: [facility];  Service: Gastroenterology;  Laterality: N/A;  dilated to 20mm    HYSTERECTOMY  age 38    TUBAL ABDOMINAL LIGATION Bilateral      Family History   Problem Relation Age  of Onset    Heart failure Father     Heart disease Sister     Heart attack Sister     Breast cancer Neg Hx      Social History     Socioeconomic History    Marital status:    Tobacco Use    Smoking status: Never     Passive exposure: Never    Smokeless tobacco: Never   Vaping Use    Vaping status: Never Used   Substance and Sexual Activity    Alcohol use: Never    Drug use: Never    Sexual activity: Defer     ---------------------------------------------------------------------------------------------------------------------   Allergies:  Ampicillin and Clonidine  ---------------------------------------------------------------------------------------------------------------------   Home medications:    Medications below are reported home medications pulling from within the system; at this time, these medications have not been reconciled unless otherwise specified and are in the verification process for further verifcation as current home medications.  (Not in a hospital admission)      Hospital Scheduled Meds:  methylPREDNISolone sodium succinate, 80 mg, Intravenous, Q6H           Current listed hospital scheduled medications may not yet reflect those currently placed in orders that are signed and held awaiting patient's arrival to floor.   ---------------------------------------------------------------------------------------------------------------------     Objective     Vital Signs:  Temp:  [97.4 °F (36.3 °C)] 97.4 °F (36.3 °C)  Heart Rate:  [] 76  Resp:  [20-35] 20  BP: (152-180)/(70-90) 176/77      02/26/25  0836   Weight: 74.1 kg (163 lb 5.8 oz)     Body mass index is 29.87 kg/m².  ---------------------------------------------------------------------------------------------------------------------       Physical Exam  Vitals and nursing note reviewed.   Constitutional:       General: She is not in acute distress.  HENT:      Head: Normocephalic and atraumatic.   Eyes:      Extraocular Movements:  "Extraocular movements intact.   Cardiovascular:      Rate and Rhythm: Normal rate and regular rhythm.   Pulmonary:      Breath sounds: Wheezing and rhonchi present.   Abdominal:      Palpations: Abdomen is soft.      Tenderness: There is no abdominal tenderness.   Musculoskeletal:      Right lower leg: No edema.      Left lower leg: No edema.   Skin:     General: Skin is warm and dry.      Coloration: Skin is pale.   Neurological:      Mental Status: She is alert. Mental status is at baseline.   Psychiatric:         Mood and Affect: Mood normal.         Behavior: Behavior normal.         ---------------------------------------------------------------------------------------------------------------------  EKG:        I have personally looked at the EKG.  ---------------------------------------------------------------------------------------------------------------------   Results from last 7 days   Lab Units 02/26/25  0941 02/26/25  0901   LACTATE mmol/L 3.4*  --    WBC 10*3/mm3  --  21.42*   HEMOGLOBIN g/dL  --  13.3   HEMATOCRIT %  --  40.9   MCV fL  --  91.1   MCHC g/dL  --  32.5   PLATELETS 10*3/mm3  --  346     Results from last 7 days   Lab Units 02/26/25  0839   PH, ARTERIAL pH units 7.450   PO2 ART mm Hg 77.1*   PCO2, ARTERIAL mm Hg 40.2   HCO3 ART mmol/L 27.9*     Results from last 7 days   Lab Units 02/26/25  0901   SODIUM mmol/L 141   POTASSIUM mmol/L 4.2   CHLORIDE mmol/L 102   CO2 mmol/L 26.5   BUN mg/dL 11   CREATININE mg/dL 0.79   CALCIUM mg/dL 9.3   GLUCOSE mg/dL 184*   ALBUMIN g/dL 3.9   BILIRUBIN mg/dL 0.5   ALK PHOS U/L 120*   AST (SGOT) U/L 54*   ALT (SGPT) U/L 23   Estimated Creatinine Clearance: 56.2 mL/min (by C-G formula based on SCr of 0.79 mg/dL).  No results found for: \"AMMONIA\"  Results from last 7 days   Lab Units 02/26/25  0956 02/26/25  0901   HSTROP T ng/L 33* 28*     Results from last 7 days   Lab Units 02/26/25  0901   PROBNP pg/mL 3,228.0*     Lab Results   Component Value Date    " "HGBA1C 5.9 (H) 04/26/2024     Lab Results   Component Value Date    TSH 1.410 02/12/2021    FREET4 1.2 04/26/2024     No results found for: \"PREGTESTUR\", \"PREGSERUM\", \"HCG\", \"HCGQUANT\"  Pain Management Panel           No data to display              No results found for: \"BLOODCX\"  No results found for: \"URINECX\"  No results found for: \"WOUNDCX\"  No results found for: \"STOOLCX\"      ---------------------------------------------------------------------------------------------------------------------  Imaging Results (Last 7 Days)       Procedure Component Value Units Date/Time    CT Angiogram Chest Pulmonary Embolism [[id number]] Collected: 02/26/25 1007     Updated: 02/26/25 1010    Narrative:      EXAM:    CT Angiography Chest With Intravenous Contrast     EXAM DATE:    2/26/2025 10:07 AM     CLINICAL HISTORY:    elevated d dimer     TECHNIQUE:    Axial computed tomographic angiography images of the chest with  intravenous contrast.  This CT exam was performed using one or more of  the following dose reduction techniques:  automated exposure control,  adjustment of the mA and/or kV according to patient size, and/or use of  iterative reconstruction technique.    MIP reconstructed images were created and reviewed.     COMPARISON:    No relevant prior studies available.     FINDINGS:    Pulmonary arteries:  Unremarkable as visualized.  No pulmonary  embolism.    Aorta:  No acute findings.  No thoracic aortic aneurysm.    Lungs and pleural spaces:  Diffuse bilateral airspace pneumonia and  given dense consolidation consider alveolar hemorrhage.  No significant  effusion.    Heart:  Unremarkable as visualized.  No cardiomegaly.  No significant  pericardial effusion.  No evidence of RV dysfunction.    Bones/joints:  No acute fracture.  No dislocation.    Soft tissues:  Unremarkable as visualized.    Lymph nodes:  Unremarkable as visualized.  No enlarged lymph nodes.       Impression:      1.  Diffuse bilateral airspace " "pneumonia and given dense consolidation  consider alveolar hemorrhage.  2.  No pulmonary embolism.     This report was finalized on 2/26/2025 10:08 AM by Dr. Theodore Bautista MD.       XR Chest AP [[id number]] Collected: 02/26/25 0942     Updated: 02/26/25 0944    Narrative:      EXAM:    XR Chest, 1 View     EXAM DATE:    2/26/2025 9:42 AM     CLINICAL HISTORY:    hypoxemia     TECHNIQUE:    Frontal view of the chest.     COMPARISON:    2/1/2025     FINDINGS:    Lungs and pleural spaces:  Patchy bilateral airspace disease.  No  consolidation.  No pneumothorax.    Heart:  Unremarkable as visualized.  No cardiomegaly.    Mediastinum:  Unremarkable as visualized.  Normal mediastinal contour.    Bones/joints:  Unremarkable as visualized.  No acute fracture.       Impression:        Patchy bilateral airspace disease.     This report was finalized on 2/26/2025 9:42 AM by Dr. Theodore Bautista MD.               Cultures:  No results found for: \"BLOODCX\", \"URINECX\", \"WOUNDCX\", \"MRSACX\", \"RESPCX\", \"STOOLCX\"    Last echocardiogram:  Results for orders placed during the hospital encounter of 09/29/23    Adult Transthoracic Echo Complete w/ Color, Spectral and Contrast if necessary per protocol    Interpretation Summary    Normal left ventricular cavity size and wall thickness noted. All left ventricular wall segments contract normally.    Left ventricular ejection fraction appears to be 61 - 65%.    The aortic valve is structurally normal with no stenosis present. Trace aortic valve regurgitation is present.    The mitral valve is structurally normal with no significant stenosis present. Mild mitral valve regurgitation is present.    . There is no evidence of pericardial effusion. .          I have personally reviewed the above radiology images and read the final radiology report on 02/26/25  ---------------------------------------------------------------------------------------------------------------------  Assessment / Plan " "    Active Hospital Problems    Diagnosis  POA    **Acute hypoxic respiratory failure [J96.01]  Yes       ASSESSMENT/PLAN:    Acute hypoxic respiratory failure  Pulmonary fibrosis  Concern for aspiration pneumonia versus DAH  SIRS versus sepsis  Patient presented to the ED after she woke this morning with SpO2 33% on home pulse oximeter.  She reports 1 week history of worsening shortness of breath and cough.  Cough has been largely nonproductive but she did have hemoptysis this morning.  Also with history of dysphagia and reporting she \"strangled\" on her dinner overnight.  On arrival to the ED she was saturating 44% on nonrebreather with heart rate 107.  She is afebrile.  Her lactate was 3.4, Pro-Brooks normal, D-dimer 1.73 and white blood cell count 21.42.  CT PE protocol negative for PE however did note diffuse bilateral pneumonia and given dense consolidation consider alveolar hemorrhage.  Will admit to the PCU for further workup and management  Obtain respiratory PCR, MRSA screen, sputum culture  Will continue antimicrobial coverage with Rocephin and azithromycin for now.  Consult pulmonology for further assistance and recommendations, appreciated.  Will continue high-dose Solu-Medrol per recs.  Patient will remain n.p.o. pending SLP consultation  Continue to monitor respiratory status closely-titrate supplemental O2 as needed and wean as able.  Continue as needed nebs  Trend labs    Chronic:  GERD  Thyroid disease  HTN  HLD  Continue home regimen as indicated once med rec is complete  Monitor vital signs closely  ----------  -DVT prophylaxis: SCDs  -Activity: Ad true.  -Expected length of stay: INPATIENT status due to the need for care which can only be reasonably provided in an hospital setting such as aggressive/expedited ancillary services and/or consultation services, the necessity for IV medications, close physician monitoring and/or the possible need for procedures.  In such, I feel patient’s risk for adverse " outcomes and need for care warrant INPATIENT evaluation and predict the patient’s care encounter to likely last beyond 2 midnights.   -Disposition pending further clinical course     High risk secondary to Respiratory failure, pulmonary fibrosis, concern for aspiration pna vs DAH    There are no questions and answers to display.       Felice Suazo PA-C   02/26/25  12:43 EST

## 2025-02-26 NOTE — CASE MANAGEMENT/SOCIAL WORK
Discharge Planning Assessment  [facility]     Patient Name: Gregoria Pendleton  MRN: [id number]  Today's Date: 2/26/2025    Admit Date: 2/26/2025    Plan: SS received nursing consult for Currently has DME. Pt is on heated; high flow at 60 liters. SS spoke with pt at bedside on this date. Pt lives at [address] [place] [address]) with ex-spouse Jarocho and plans to return back home at discharge. Pt does not utilize  services at this time. Pt PCP Marisa Arias. Pt has rollator, (s) cane, BSC, wheelchair via family and home o2 at 2 liters via susy-rite. Pt has no POA or living will. Pt voiced she utilizes her rollator and cane when ambulating at home. Pt voiced her ex-spouse assist with her care at home. Pt family to provide transportation at discharge. SS to follow and assist with discharge planning.   Discharge Needs Assessment       Row Name 02/26/25 1619       Living Environment    People in Home other (see comments)  ex-spouse    Current Living Arrangements home    Primary Care Provided by self    Provides Primary Care For no one    Family Caregiver if Needed grandchild(sd), adult;other (see comments)    Family Caregiver Names ex spouse Jarocho and son    Quality of Family Relationships helpful;involved;supportive    Able to Return to Prior Arrangements yes       Resource/Environmental Concerns    Resource/Environmental Concerns none       Transition Planning    Patient/Family Anticipates Transition to home with family    Patient/Family Anticipated Services at Transition     Transportation Anticipated family or friend will provide       Discharge Needs Assessment    Equipment Currently Used at Home oxygen;rollator;commode;wheelchair;cane, straight    Concerns to be Addressed --  Currently has DME                   Discharge Plan       Row Name 02/26/25 1621       Plan    Plan SS received nursing consult for Currently has DME. Pt is on heated; high flow at 60 liters. SS spoke with pt at bedside on this  date. Pt lives at [address] [place] [address] ([facility]) with ex-spouse Jarocho and plans to return back home at discharge. Pt does not utilize HH services at this time. Pt PCP Marisa Arias. Pt has rollator, (s) cane, BSC, wheelchair via family and home o2 at 2 liters via susy-rite. Pt has no POA or living will. Pt voiced she utilizes her rollator and cane when ambulating at home. Pt voiced her ex-spouse assist with her care at home. Pt family to provide transportation at discharge. SS to follow and assist with discharge planning.               Expected Discharge Date and Time       Expected Discharge Date Expected Discharge Time    Mar 1, 2025        GERMAN Prajapati

## 2025-02-26 NOTE — ED PROVIDER NOTES
Subjective   History of Present Illness  This 79-year-old female has pulmonary fibrosis.  The pulmonary fibrosis may have been related to COVID or the COVID vaccines.    She was short of breath yesterday refused to come to the hospital.  This morning her O2 sats were around 34 according to the .  Ambulance was called and she was brought in.    Patient is also had problems with the stroke may be related to the COVID-vaccine.  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    No complaints of chills or fever.  No nausea and vomiting.        Review of Systems   Constitutional:  Positive for fatigue. Negative for activity change.   HENT:  Negative for congestion, mouth sores and sneezing.    Eyes:  Negative for discharge.   Respiratory:  Positive for shortness of breath.    Cardiovascular:  Negative for chest pain.   Gastrointestinal:  Negative for abdominal distention and abdominal pain.   Endocrine: Negative for cold intolerance.   Genitourinary:  Negative for difficulty urinating and dysuria.   Musculoskeletal:  Negative for arthralgias.   Neurological:  Negative for dizziness.   Psychiatric/Behavioral:  Negative for behavioral problems.    All other systems reviewed and are negative.      Past Medical History:   Diagnosis Date    Arthritis     Disease of thyroid gland     Elevated cholesterol     GERD (gastroesophageal reflux disease)     Hyperlipidemia     Hypertension     Migraine     Pulmonary fibrosis     Stroke        Allergies   Allergen Reactions    Ampicillin Diarrhea    Clonidine Other (See Comments)     Patches cause chemical burns       Past Surgical History:   Procedure Laterality Date    CYSTOSCOPY W/ LASER LITHOTRIPSY  01/15/2024    ENDOSCOPY N/A 12/12/2024    Procedure: ESOPHAGOGASTRODUODENOSCOPY WITH BIOPSY;  Surgeon: Jaky Goddard MD;  Location: [facility];  Service: Gastroenterology;  Laterality: N/A;  dilated to 20mm    HYSTERECTOMY  age 38    TUBAL ABDOMINAL  LIGATION Bilateral        Family History   Problem Relation Age of Onset    Heart failure Father     Heart disease Sister     Heart attack Sister     Breast cancer Neg Hx        Social History     Socioeconomic History    Marital status:    Tobacco Use    Smoking status: Never     Passive exposure: Never    Smokeless tobacco: Never   Vaping Use    Vaping status: Never Used   Substance and Sexual Activity    Alcohol use: Never    Drug use: Never    Sexual activity: Defer           Objective   Physical Exam  HENT:      Head: Normocephalic.      Right Ear: External ear normal.      Left Ear: External ear normal.      Mouth/Throat:      Mouth: Mucous membranes are moist.   Eyes:      Extraocular Movements: Extraocular movements intact.      Pupils: Pupils are equal, round, and reactive to light.   Cardiovascular:      Rate and Rhythm: Normal rate and regular rhythm.      Heart sounds: No murmur heard.  Pulmonary:      Effort: Pulmonary effort is normal.      Breath sounds: Rales present.   Abdominal:      General: Abdomen is flat. Bowel sounds are normal.      Palpations: Abdomen is soft.   Musculoskeletal:         General: No swelling. Normal range of motion.      Cervical back: Normal range of motion. No rigidity.   Skin:     General: Skin is warm and dry.      Capillary Refill: Capillary refill takes less than 2 seconds.   Neurological:      General: No focal deficit present.      Mental Status: She is alert.   Psychiatric:         Mood and Affect: Mood normal.         Procedures           ED Course  ED Course as of 03/01/25 0200 Wed Feb 26, 2025   0910 pH, Arterial: 7.450 [GP]   0910 pCO2, Arterial: 40.2 [GP]   0910 pO2, Arterial(!): 77.1 [GP]   0911 O2 Saturation, Arterial: 96.2 [GP]   1001 D-dimer is elevated.  Troponin slightly elevated.    Glucose is 184.  There her alkaline phosphatase is elevated this AST is elevated.    D-dimer is 1.7.    White count is 21,000 with an H&H of 13 and 40.    Blood gas  has a pH of 745 with a pCO2 40 pO2 77 saturation is 96.  100% of Oxygen.    Will get her PE study back and then start getting her admitted. [GP]   1134 CAT scan of the chest reveals alveolar pneumonia.  She has pulmonary fibrosis.    She is hypoxic when she gets here.  O2 sat was in the 30s according to the .    Blood gas had a pH of 7.43 pCO2 40 and pO2 77.  Good O2 sat was in the 30s at home.    White count is 21,000 with an H&H of 13 and 40 platelets are 346.    Troponin is 33.  Lactic acid is 3.4. [GP]   1252 EKG is done.    Ventricular rate is 89.  MI interval is 150.  QRS is 80.  Patient in normal sinus rhythm.  No acute ST segment elevation or depression.  No STEMI. [GP]      ED Course User Index  [GP] Lazaro Damian MD                                                       Medical Decision Making  Patient has a history of pulmonary fibrosis.  She is hypoxic this morning will look for infection.      CAT scan of the chest  suggest an alveoli infection.  She was hypoxic at home with O2 sats in the 30s according to the .  We have got her on a belief 7 L of blow-by oxygen to keep her O2 sats up around 94%.    Will try and get her admitted.      Problems Addressed:  Aspiration pneumonia of both lungs, unspecified aspiration pneumonia type, unspecified part of lung: complicated acute illness or injury  Pulmonary fibrosis: complicated acute illness or injury    Amount and/or Complexity of Data Reviewed  Labs: ordered. Decision-making details documented in ED Course.  Radiology: ordered.  ECG/medicine tests: ordered.    Risk  Prescription drug management.  Decision regarding hospitalization.        Final diagnoses:   Pulmonary fibrosis   Aspiration pneumonia of both lungs, unspecified aspiration pneumonia type, unspecified part of lung       ED Disposition  ED Disposition       ED Disposition   Decision to Admit    Condition   --    Comment   Level of Care: Progressive Care [20]  Diagnosis: Acute  hypoxic respiratory failure [1975116]  Admitting Physician: EFRAÍN SORTO [[id number]]  Attending Physician: EFRAÍN SORTO [[id number]]  Certification: I Certify That Inpatient Hospital  Services Are Medically Necessary For Greater Than 2 Midnights                 No follow-up provider specified.       Medication List        ASK your doctor about these medications      aspirin 81 MG EC tablet  Ask about: Which instructions should I use?                 Lazaro Damian MD  03/01/25 0204

## 2025-02-26 NOTE — CONSULTS
Referring Provider: Dr. Avila  Reason for Consultation: Abnormal CT chest, ILD and DAH      Chief complaint -shortness of breath and coughing up blood      History of present illness:  -Patient is a 79-year-old female with past medical history positive for arthritis, thyroid disease, elevated cholesterol, GERD, hyperlipidemia, hypertension and pulmonary fibrosis.  Presented with worsening shortness of breath and coughing up blood    CT of the chest reviewed discussed with patient and patient's .  Answered their questions to their satisfaction.  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  Currently patient is on 60% and 60 L on high flow nasal cannula oxygen.    All the labs medications ins and outs vitals treatment given in the ER then on the floor reviewed.    Review of Systems  History obtained from chart review and the patient  General ROS: negative for - chills, fatigue or fever  Psychological ROS: negative for - anxiety or depression  ENT ROS: negative for - headaches, visual changes or vocal changes  Respiratory ROS: positive for - coughing up blood and shortness of breath  Cardiovascular ROS: no chest pain or dyspnea on exertion  Gastrointestinal ROS: no abdominal pain, change in bowel habits, or black or bloody stools  Musculoskeletal ROS: negative for - joint pain, joint stiffness or joint swelling  Neurological ROS: no TIA or stroke symptoms  Hematological: no bleeding  Skin: no bruises, no rash        History  Past Medical History:   Diagnosis Date    Arthritis     Disease of thyroid gland     Elevated cholesterol     GERD (gastroesophageal reflux disease)     Hyperlipidemia     Hypertension     Migraine     Pulmonary fibrosis     Stroke    ,   Past Surgical History:   Procedure Laterality Date    CYSTOSCOPY W/ LASER LITHOTRIPSY  01/15/2024    ENDOSCOPY N/A 12/12/2024    Procedure: ESOPHAGOGASTRODUODENOSCOPY WITH BIOPSY;  Surgeon: Rupesh  Jaky CHARLES MD;  Location: [facility];  Service: Gastroenterology;  Laterality: N/A;  dilated to 20mm    HYSTERECTOMY  age 38    TUBAL ABDOMINAL LIGATION Bilateral    ,   Family History   Problem Relation Age of Onset    Heart failure Father     Heart disease Sister     Heart attack Sister     Breast cancer Neg Hx    ,   Social History     Tobacco Use    Smoking status: Never     Passive exposure: Never    Smokeless tobacco: Never   Vaping Use    Vaping status: Never Used   Substance Use Topics    Alcohol use: Never    Drug use: Never   ,   Medications Prior to Admission   Medication Sig Dispense Refill Last Dose/Taking    amLODIPine-benazepril (LOTREL) 5-40 MG per capsule Take 1 capsule by mouth Daily.   2/25/2025    aspirin 81 MG EC tablet Take 1 tablet by mouth Daily.   2/25/2025 Morning    atenolol (TENORMIN) 25 MG tablet Take 1 tablet by mouth Daily.   2/25/2025    cetirizine (zyrTEC) 10 MG tablet Take 1 tablet by mouth Daily.   2/25/2025    cloNIDine (CATAPRES) 0.2 MG tablet Take 1 tablet by mouth 3 times a day.   2/25/2025    donepezil (ARICEPT) 10 MG tablet Take 1 tablet by mouth Every Night.   2/25/2025    DULoxetine (CYMBALTA) 60 MG capsule Take 1 capsule by mouth Daily.   2/25/2025    Emgality 120 MG/ML auto-injector pen Inject 1 mL under the skin into the appropriate area as directed Every 30 (Thirty) Days.   Past Week    estradiol (ESTRACE) 1 MG tablet Take 1 tablet by mouth Daily.   2/25/2025    FLUoxetine (PROzac) 10 MG capsule Take 1 capsule by mouth Daily.   2/25/2025    isosorbide mononitrate (IMDUR) 30 MG 24 hr tablet Take 1 tablet by mouth Daily. 30 tablet 11 2/25/2025    levothyroxine (SYNTHROID, LEVOTHROID) 100 MCG tablet Take 1 tablet by mouth Daily.   2/25/2025    Linzess 145 MCG capsule capsule Take 1 capsule by mouth Every Morning Before Breakfast.   2/25/2025    lovastatin (MEVACOR) 20 MG tablet Take 1 tablet by mouth Every Evening.   2/25/2025    nystatin (MYCOSTATIN) 915748 UNIT/GM ointment  Apply 1 Application topically to the appropriate area as directed 2 (Two) Times a Day.   2/25/2025    omega-3 acid ethyl esters (LOVAZA) 1 g capsule Take 2 capsules by mouth 2 (Two) Times a Day.   2/25/2025    omeprazole (priLOSEC) 40 MG capsule Take 1 capsule by mouth 2 (Two) Times a Day. 60 capsule 3 2/25/2025    rOPINIRole (REQUIP) 0.5 MG tablet Take 1 tablet by mouth Daily.   2/25/2025    spironolactone (ALDACTONE) 50 MG tablet Take 1 tablet by mouth 2 (Two) Times a Day.   2/25/2025    Tirzepatide 10 MG/0.5ML solution auto-injector Inject 10 mg under the skin into the appropriate area as directed 1 (One) Time Per Week.   Past Week    vitamin D (ERGOCALCIFEROL) 1.25 MG (58963 UT) capsule capsule Take 1 capsule by mouth 1 (One) Time Per Week.   Past Week    albuterol sulfate  (90 Base) MCG/ACT inhaler Inhale 2 puffs Every 4 (Four) Hours As Needed for Wheezing or Shortness of Air.   Unknown    clonazePAM (KlonoPIN) 0.5 MG tablet Take 1 tablet by mouth 2 (Two) Times a Day As Needed for Anxiety.   Unknown    ipratropium-albuterol (DUO-NEB) 0.5-2.5 mg/3 ml nebulizer Take 3 mL by nebulization Every 4 (Four) Hours As Needed for Wheezing. 360 mL 0 Unknown   , Scheduled Meds:  amLODIPine, 5 mg, Oral, Q24H   And  lisinopril, 40 mg, Oral, Q24H  aspirin, 81 mg, Oral, Daily  atenolol, 25 mg, Oral, Daily  atorvastatin, 10 mg, Oral, Daily  azithromycin, 500 mg, Intravenous, Q24H  cefTRIAXone, 2,000 mg, Intravenous, Q24H  cetirizine, 10 mg, Oral, Daily  [START ON 2/27/2025] cholecalciferol, 50,000 Units, Oral, Weekly  cloNIDine, 0.2 mg, Oral, Q8H  donepezil, 10 mg, Oral, Nightly  DULoxetine, 60 mg, Oral, Daily  estradiol, 1 mg, Oral, Daily  FLUoxetine, 10 mg, Oral, Daily  insulin lispro, 2-7 Units, Subcutaneous, 4x Daily AC & at Bedtime  isosorbide mononitrate, 30 mg, Oral, Daily  levothyroxine, 100 mcg, Oral, QAM AC  lubiprostone, 8 mcg, Oral, BID  methylPREDNISolone sodium succinate, 80 mg, Intravenous, Q6H  nystatin, 1  Application, Topical, BID  [START ON 2/27/2025] pantoprazole, 40 mg, Oral, Q AM  rOPINIRole, 0.5 mg, Oral, Daily  sodium chloride, 10 mL, Intravenous, Q12H  sodium chloride, 10 mL, Intravenous, Q12H  spironolactone, 50 mg, Oral, BID    , Continuous Infusions:    and Allergies:  Ampicillin and Clonidine    Objective     Vital Signs   Temp:  [97.4 °F (36.3 °C)-98.9 °F (37.2 °C)] 98.4 °F (36.9 °C)  Heart Rate:  [] 76  Resp:  [20-35] 29  BP: (152-180)/(70-90) 163/70    Physical Exam:             General-acutely ill in appearance, not in any acute distress    HEENT- pupils equally reactive to light, normal in size, no scleral icterus    Neck-supple    Respiratory-respirations normal-on auscultation no wheezing no crackles, on high flow nasal cannula oxygen    Cardiovascular-  Normal S1 and S2. No S3, S4 or murmurs. No JVD, no carotid bruit and no edema, pulses normal bilaterally     GI-nontender nondistended bowel sounds positive    CNS-nonfocal    Musculoskeletal -no edema  Extremities- no obvious deformity noticed     Psychiatric-mood good, good eye contact, alert awake oriented  Skin- no visible rash                                                                   Results Review:    LABS:    Lab Results   Component Value Date    GLUCOSE 184 (H) 02/26/2025    BUN 11 02/26/2025    CREATININE 0.79 02/26/2025    EGFRIFNONA 66 02/12/2021    BCR 13.9 02/26/2025    CO2 26.5 02/26/2025    CALCIUM 9.3 02/26/2025    ALBUMIN 3.9 02/26/2025    AST 54 (H) 02/26/2025    ALT 23 02/26/2025    WBC 21.42 (H) 02/26/2025    HGB 13.3 02/26/2025    HCT 40.9 02/26/2025    MCV 91.1 02/26/2025     02/26/2025     02/26/2025    K 4.2 02/26/2025     02/26/2025    ANIONGAP 12.5 02/26/2025       Lab Results   Component Value Date    INR 0.95 12/26/2024    PROTIME 10.3 12/26/2024                I reviewed the patient's new clinical results.  I reviewed the patient's new imaging results and agree with the  interpretation.      Microbiology Results (last 10 days)       Procedure Component Value - Date/Time    Respiratory Panel PCR w/COVID-19(SARS-CoV-2) KORY/LESIA/TAWNY/PAD/COR/ENID In-House, NP Swab in UTM/VTM, 2 HR TAT - Swab, Nasopharynx [[id number]]  (Normal) Collected: 02/26/25 1321    Lab Status: Final result Specimen: Swab from Nasopharynx Updated: 02/26/25 1424     ADENOVIRUS, PCR Not Detected     Coronavirus 229E Not Detected     Coronavirus HKU1 Not Detected     Coronavirus NL63 Not Detected     Coronavirus OC43 Not Detected     COVID19 Not Detected     Human Metapneumovirus Not Detected     Human Rhinovirus/Enterovirus Not Detected     Influenza A PCR Not Detected     Influenza B PCR Not Detected     Parainfluenza Virus 1 Not Detected     Parainfluenza Virus 2 Not Detected     Parainfluenza Virus 3 Not Detected     Parainfluenza Virus 4 Not Detected     RSV, PCR Not Detected     Bordetella pertussis pcr Not Detected     Bordetella parapertussis PCR Not Detected     Chlamydophila pneumoniae PCR Not Detected     Mycoplasma pneumo by PCR Not Detected    Narrative:      In the setting of a positive respiratory panel with a viral infection PLUS a negative procalcitonin without other underlying concern for bacterial infection, consider observing off antibiotics or discontinuation of antibiotics and continue supportive care. If the respiratory panel is positive for atypical bacterial infection (Bordetella pertussis, Chlamydophila pneumoniae, or Mycoplasma pneumoniae), consider antibiotic de-escalation to target atypical bacterial infection.    MRSA Screen, PCR (Inpatient) - Swab, Nares [[id number]]  (Normal) Collected: 02/26/25 1321    Lab Status: Final result Specimen: Swab from Nares Updated: 02/26/25 1445     MRSA PCR No MRSA Detected    Narrative:      The negative predictive value of this diagnostic test is high and should only be used to consider de-escalating anti-MRSA therapy. A positive result may indicate  colonization with MRSA and must be correlated clinically.            Latest Reference Range & Units 02/26/25 08:39   pH, Arterial 7.350 - 7.450 pH units 7.450   pCO2, Arterial 35.0 - 45.0 mm Hg 40.2   pO2, Arterial 83.0 - 108.0 mm Hg 77.1 (L)   HCO3, Arterial 20.0 - 26.0 mmol/L 27.9 (H)   Base Excess 0.0 - 2.0 mmol/L 3.6 (H)   O2 Saturation, Arterial 94.0 - 99.0 % 96.2   CO2 Content 22 - 33 mmol/L 29.1   A-a DO2 0.0 - 300.0 mmHg 561.3 (H)   Carboxyhemoglobin 0 - 5 % 1.7   Methemoglobin 0.00 - 3.00 % 0.20   Oxyhemoglobin 94 - 99 % 94.4   Hematocrit, Blood Gas 38.0 - 51.0 % 39.1   Hemoglobin, Blood Gas 13.5 - 17.5 g/dL 12.8 (L)   Site  Right Brachial   Imer's Test  N/A   Modality  NRB   FIO2 % 100   Ventilator Mode  NA   Barometric Pressure for Blood Gas mmHg 726   (L): Data is abnormally low  (H): Data is abnormally high  This result has an attachment that is not available.  Pulmonary Function Testing Report    Gregoria Pendleton 78 y.o. underwent pulmonary function testing today at the  [facility].    The patient underwent spirometry, lung volumes by body plethysmography,  and diffusion capacity testing testing.    All tests were appropriately administered via ATS/ERS criteria. Testing is  acceptable and repeatable.    Spirometry:  Proportionate reduction in FEV1 and FVC with a normal ratio suggestive of  a restrictive process.  There is no significant positive bronchodilator  response.    Lung Volumes:  Reduced TLC consistent with simple restriction.    Diffusion Capacity:  Diffusion capacity uncorrected for Hb is severely reduced. A reduced DLCO  with a low VA and low/normal KCO is a pattern that may suggest loss of  alveolar capillary structure with loss of lung volume in conditions such  as emphysema and ILD.  IVC was < 90% of VC per ATS guidelines.  Trend:  Compared to previous study dated 4/26/24, there has been no significant  change in FEV1 and FVC.  All Measurements     Exam End: 06/06/24 11:24     Specimen Collected: 06/06/24 07:33 Last Resulted: 06/06/24 17:02   Received From: [facility]  Result Received: 07/29/24 09:09      Assessment & Plan       Neurology-alert awake oriented no active issues going on    Respiratory-acute on chronic hypoxic respiratory failure-likely due to flareup of her interstitial lung disease.  Currently having diffuse alveolar hemorrhage.    Received steroids in the ER.  Increase the dose of steroids to 1 g IV Solu-Medrol daily for next 3 days then will slowly taper.  Vent settings   Give diuretics to improve lung compliance and diffusion capacity.    Chest x-ray-latest reviewed    ABG-latest reviewed    If respiratory status gets worse can start on BiPAP with settings 20 x 10  Respiratory rate 20  Adjust FiO2 to maintain saturation 92 to 95%  If blood pressure is good-  Can also consider giving a dose of diuretics.    Cardiology- hemodynamically -stable.  Continue to monitor HR- rate and rhythm, BP     Nephrology- Cr and BUN stable  I/O-reviewed    GI-if improves can start on a diet    Hematology- CBC  Hb  platelet  WBC    ID  Culture  And Antibiotics    Endrocrinology- Maintain Blood sugar 140 -180      Electrolytes-   Mag and phos       DVT prophylaxis-  Continue  Bedside rounds were done with RT and patient's nurse. All the lab and clinical findings were discussed with them and plan was also discussed in great detail.    Family member present- at bedside answered his questions to satisfaction.  Overall the labs and CAT scan of the chest.        Echo-  Results for orders placed during the hospital encounter of 09/29/23    Adult Transthoracic Echo Complete w/ Color, Spectral and Contrast if necessary per protocol    Interpretation Summary    Normal left ventricular cavity size and wall thickness noted. All left ventricular wall segments contract normally.    Left ventricular ejection fraction appears to be 61 - 65%.    The aortic valve is structurally normal with no  stenosis present. Trace aortic valve regurgitation is present.    The mitral valve is structurally normal with no significant stenosis present. Mild mitral valve regurgitation is present.    . There is no evidence of pericardial effusion. .  Currently patient is critically ill due to hypoxic respiratory failure requiring very high settings of high flow nasal cannula oxygen flareup of the ILD and diffuse alveolar hemorrhage.  Patient is very high risk for respiratory failure will monitor closely and might need intubation.          Acute hypoxic respiratory failure          Maikel Trevino MD  02/26/25  16:38 EST

## 2025-02-26 NOTE — Clinical Note
Level of Care: Progressive Care [20]   Diagnosis: Acute hypoxic respiratory failure [1975116]   Admitting Physician: EFRAÍN SORTO [[id number]]   Attending Physician: EFRAÍN SORTO [[id number]]   Certification: I Certify That Inpatient Hospital Services Are Medically Necessary For Greater Than 2 Midnights

## 2025-02-26 NOTE — PLAN OF CARE
Problem: Adult Inpatient Plan of Care  Goal: Plan of Care Review  Outcome: Progressing     Problem: Adult Inpatient Plan of Care  Goal: Patient-Specific Goal (Individualized)  Outcome: Progressing     Problem: Adult Inpatient Plan of Care  Goal: Absence of Hospital-Acquired Illness or Injury  Outcome: Progressing   Goal Outcome Evaluation:      Pt. Is alert and oriented, VSS, 60L-HHFNC @ 60%. Plan of care ongoing.

## 2025-02-27 NOTE — PLAN OF CARE
Problem: Adult Inpatient Plan of Care  Goal: Plan of Care Review  Outcome: Progressing     Problem: Adult Inpatient Plan of Care  Goal: Patient-Specific Goal (Individualized)  Outcome: Progressing   Goal Outcome Evaluation:   Pt Alert and oriented with intermittent confusion, on 60L HHFNC @ 60%, 250cc bolus given for hypotension. Plan of care ongoing.

## 2025-02-27 NOTE — SIGNIFICANT NOTE
02/27/25 1342   OTHER   Discipline physical therapist   Rehab Time/Intention   Session Not Performed unable to evaluate, medical status change  (Pt on hold per nursing staff)
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
## 2025-02-27 NOTE — PAYOR COMM NOTE
"[facility]  NPI:[id number]    Utilization Review  Contact: Kathleen Whitehead RN  Phone: [phone]  Fax:[phone]    INITIATE INPATIENT AUTHORIZATION  Gregoria Bojorquez (79 y.o. Female)       Date of Birth   1945    Social Security Number       Address   [address] [place] [address]    Home Phone   [phone]    MRN   [id number]       Mormonism   Judaism    Marital Status                               Admission Date   2/26/25    Admission Type   Emergency    Admitting Provider   Lito Branch DO    Attending Provider   Lito Branch DO    Department, Room/Bed   [facility] PROGRESS CARE, P213/S2       Discharge Date       Discharge Disposition       Discharge Destination                                 Attending Provider: Lito Branch DO    Allergies: Ampicillin, Clonidine    Isolation: None   Infection: None   Code Status: CPR    Ht: 157.5 cm (62\")   Wt: 76.3 kg (168 lb 3.4 oz)    Admission Cmt: None   Principal Problem: Acute hypoxic respiratory failure [J96.01]                   Active Insurance as of 2/26/2025       Primary Coverage       Payor Plan Insurance Group Employer/Plan Group    [facility] WELL[employer] MEDICARE ADVANTAGE SNP PPO        Payor Plan Address Payor Plan Phone Number Payor Plan Fax Number Effective Dates    [address] [address]   1/1/2025 - None Entered    [facility] [address]         Subscriber Name Subscriber Birth Date Member ID       GREGORIA BOJORQUEZ 1945 [id number]               Secondary Coverage       Payor Plan Insurance Group Employer/Plan Group    [place]        Payor Plan Address Payor Plan Phone Number Payor Plan Fax Number Effective Dates    [address]   3/22/2021 - None Entered    [facility] [id number]         Subscriber Name Subscriber Birth Date Member ID       GREGORIA BOJORQUEZ 1945 [id number]                     Emergency Contacts " "       (Rel.) Home Phone Work Phone Mobile Phone    Jarocho Pendleton (Spouse) [phone] -- [phone]                 History & Physical        Felice Suazo PA-C at 25 1243       Attestation signed by Lito Branch DO at 25 1605    I have reviewed this documentation and agree. Pt miguel ángel and examined. Treatment plan discussed as outlined. Discussed pt's care with pulmonology with input appreciated.                       [facility]  History & Physical    Patient Identification:  Name:  Gregoria Pendleton  Age:  79 y.o.  Sex:  female  :  1945  MRN:  [id number]   Visit Number:  [id number]  Admit Date: 2025   Primary Care Physician:  Marisa Arias APRN    Subjective     Chief complaint: Shortness of breath    History of presenting illness:      Gregoria Pendleton is a 79 y.o. female who presented for further evaluation of shortness of breath. She was seen and examined in the ED with son and spouse at the bedside. She is ill appearing. She and spouse report she has been increasingly more short of breath for about the past week. She is having increased cough but denies much sputum production. She does wear 2L supplemental O2 at baseline. She has not had any fevers, chills. No known sick contacts. She does complain of rhinorrhea. No abdominal pain. She complains of constipation.   She has history of dysphagia- needs to have esophagus stretched periodically. For the past few weeks she has had more difficulty swallowing. Does not eat a modified diet at baseline, has occasional difficulty swallowing pills. Last night while eating dinner she \"strangled\" but after clearing her O2 sats normalized at home. She went on to bed and upon waking this morning they found her O2 saturation to be 33% on home pulse ox. Her  noted her to have some hemoptysis this morning as well and she did have one episode of vomiting. She denies recent " epistaxis.   On further ROS she denied chest pain, palpitations, or increased lower extremity edema. See full ROS below.     Past medical history is significant for pulmonary fibrosis, Hx CVA, GERD, dysphagia, thyroid disease, HTN, HLD    Upon arrival to the ED, vital signs were temp 97.4, heart rate 107, respirations 35, SpO2 44% on nonrebreather, /78.  ABG obtained on 100% FiO2 nonrebreather showed pH normal, pCO2 normal, pO2 77.1.  Her initial HS troponin was 28 with repeat at 33, proBNP is 3228.  White blood cell count is elevated at 21.42 with D-dimer 1.73, lactate 3.4 and Pro-Brooks normal.  CT PE protocol negative for PE-did note diffuse bilateral airspace pneumonia-dense consolidation concerning for alveolar hemorrhage.    Known Emergency Department medications received prior to my evaluation included Isovue, DuoNeb, 125 mg Solu-Medrol, Zosyn.   Emergency Department Room location at the time of my evaluation was 113.     ---------------------------------------------------------------------------------------------------------------------   Review of Systems   Constitutional:  Negative for chills and fever.   HENT:  Positive for rhinorrhea. Negative for congestion.    Respiratory:  Positive for cough, choking and shortness of breath.    Cardiovascular:  Negative for chest pain, palpitations and leg swelling.   Gastrointestinal:  Negative for abdominal pain, diarrhea and nausea.   Genitourinary:  Negative for difficulty urinating and dysuria.   Musculoskeletal:  Negative for arthralgias and myalgias.   Skin:  Negative for rash and wound.   Neurological:  Negative for dizziness and light-headedness.        ---------------------------------------------------------------------------------------------------------------------   Past Medical History:   Diagnosis Date    Arthritis     Disease of thyroid gland     Elevated cholesterol     GERD (gastroesophageal reflux disease)     Hyperlipidemia     Hypertension      Migraine     Pulmonary fibrosis     Stroke      Past Surgical History:   Procedure Laterality Date    CYSTOSCOPY W/ LASER LITHOTRIPSY  01/15/2024    ENDOSCOPY N/A 12/12/2024    Procedure: ESOPHAGOGASTRODUODENOSCOPY WITH BIOPSY;  Surgeon: Jaky Goddard MD;  Location: [facility];  Service: Gastroenterology;  Laterality: N/A;  dilated to 20mm    HYSTERECTOMY  age 38    TUBAL ABDOMINAL LIGATION Bilateral      Family History   Problem Relation Age of Onset    Heart failure Father     Heart disease Sister     Heart attack Sister     Breast cancer Neg Hx      Social History     Socioeconomic History    Marital status:    Tobacco Use    Smoking status: Never     Passive exposure: Never    Smokeless tobacco: Never   Vaping Use    Vaping status: Never Used   Substance and Sexual Activity    Alcohol use: Never    Drug use: Never    Sexual activity: Defer     ---------------------------------------------------------------------------------------------------------------------   Allergies:  Ampicillin and Clonidine  ---------------------------------------------------------------------------------------------------------------------   Home medications:    Medications below are reported home medications pulling from within the system; at this time, these medications have not been reconciled unless otherwise specified and are in the verification process for further verifcation as current home medications.  (Not in a hospital admission)      Hospital Scheduled Meds:  methylPREDNISolone sodium succinate, 80 mg, Intravenous, Q6H           Current listed hospital scheduled medications may not yet reflect those currently placed in orders that are signed and held awaiting patient's arrival to floor.   ---------------------------------------------------------------------------------------------------------------------     Objective     Vital Signs:  Temp:  [97.4 °F (36.3 °C)] 97.4 °F (36.3 °C)  Heart Rate:  []  76  Resp:  [20-35] 20  BP: (152-180)/(70-90) 176/77      02/26/25  0836   Weight: 74.1 kg (163 lb 5.8 oz)     Body mass index is 29.87 kg/m².  ---------------------------------------------------------------------------------------------------------------------       Physical Exam  Vitals and nursing note reviewed.   Constitutional:       General: She is not in acute distress.  HENT:      Head: Normocephalic and atraumatic.   Eyes:      Extraocular Movements: Extraocular movements intact.   Cardiovascular:      Rate and Rhythm: Normal rate and regular rhythm.   Pulmonary:      Breath sounds: Wheezing and rhonchi present.   Abdominal:      Palpations: Abdomen is soft.      Tenderness: There is no abdominal tenderness.   Musculoskeletal:      Right lower leg: No edema.      Left lower leg: No edema.   Skin:     General: Skin is warm and dry.      Coloration: Skin is pale.   Neurological:      Mental Status: She is alert. Mental status is at baseline.   Psychiatric:         Mood and Affect: Mood normal.         Behavior: Behavior normal.         ---------------------------------------------------------------------------------------------------------------------  EKG:        I have personally looked at the EKG.  ---------------------------------------------------------------------------------------------------------------------   Results from last 7 days   Lab Units 02/26/25  0941 02/26/25  0901   LACTATE mmol/L 3.4*  --    WBC 10*3/mm3  --  21.42*   HEMOGLOBIN g/dL  --  13.3   HEMATOCRIT %  --  40.9   MCV fL  --  91.1   MCHC g/dL  --  32.5   PLATELETS 10*3/mm3  --  346     Results from last 7 days   Lab Units 02/26/25  0839   PH, ARTERIAL pH units 7.450   PO2 ART mm Hg 77.1*   PCO2, ARTERIAL mm Hg 40.2   HCO3 ART mmol/L 27.9*     Results from last 7 days   Lab Units 02/26/25  0901   SODIUM mmol/L 141   POTASSIUM mmol/L 4.2   CHLORIDE mmol/L 102   CO2 mmol/L 26.5   BUN mg/dL 11   CREATININE mg/dL 0.79   CALCIUM mg/dL 9.3  "  GLUCOSE mg/dL 184*   ALBUMIN g/dL 3.9   BILIRUBIN mg/dL 0.5   ALK PHOS U/L 120*   AST (SGOT) U/L 54*   ALT (SGPT) U/L 23   Estimated Creatinine Clearance: 56.2 mL/min (by C-G formula based on SCr of 0.79 mg/dL).  No results found for: \"AMMONIA\"  Results from last 7 days   Lab Units 02/26/25  0956 02/26/25  0901   HSTROP T ng/L 33* 28*     Results from last 7 days   Lab Units 02/26/25  0901   PROBNP pg/mL 3,228.0*     Lab Results   Component Value Date    HGBA1C 5.9 (H) 04/26/2024     Lab Results   Component Value Date    TSH 1.410 02/12/2021    FREET4 1.2 04/26/2024     No results found for: \"PREGTESTUR\", \"PREGSERUM\", \"HCG\", \"HCGQUANT\"  Pain Management Panel           No data to display              No results found for: \"BLOODCX\"  No results found for: \"URINECX\"  No results found for: \"WOUNDCX\"  No results found for: \"STOOLCX\"      ---------------------------------------------------------------------------------------------------------------------  Imaging Results (Last 7 Days)       Procedure Component Value Units Date/Time    CT Angiogram Chest Pulmonary Embolism [[id number]] Collected: 02/26/25 1007     Updated: 02/26/25 1010    Narrative:      EXAM:    CT Angiography Chest With Intravenous Contrast     EXAM DATE:    2/26/2025 10:07 AM     CLINICAL HISTORY:    elevated d dimer     TECHNIQUE:    Axial computed tomographic angiography images of the chest with  intravenous contrast.  This CT exam was performed using one or more of  the following dose reduction techniques:  automated exposure control,  adjustment of the mA and/or kV according to patient size, and/or use of  iterative reconstruction technique.    MIP reconstructed images were created and reviewed.     COMPARISON:    No relevant prior studies available.     FINDINGS:    Pulmonary arteries:  Unremarkable as visualized.  No pulmonary  embolism.    Aorta:  No acute findings.  No thoracic aortic aneurysm.    Lungs and pleural spaces:  Diffuse bilateral " "airspace pneumonia and  given dense consolidation consider alveolar hemorrhage.  No significant  effusion.    Heart:  Unremarkable as visualized.  No cardiomegaly.  No significant  pericardial effusion.  No evidence of RV dysfunction.    Bones/joints:  No acute fracture.  No dislocation.    Soft tissues:  Unremarkable as visualized.    Lymph nodes:  Unremarkable as visualized.  No enlarged lymph nodes.       Impression:      1.  Diffuse bilateral airspace pneumonia and given dense consolidation  consider alveolar hemorrhage.  2.  No pulmonary embolism.     This report was finalized on 2/26/2025 10:08 AM by Dr. Theodore Bautista MD.       XR Chest AP [[id number]] Collected: 02/26/25 0942     Updated: 02/26/25 0944    Narrative:      EXAM:    XR Chest, 1 View     EXAM DATE:    2/26/2025 9:42 AM     CLINICAL HISTORY:    hypoxemia     TECHNIQUE:    Frontal view of the chest.     COMPARISON:    2/1/2025     FINDINGS:    Lungs and pleural spaces:  Patchy bilateral airspace disease.  No  consolidation.  No pneumothorax.    Heart:  Unremarkable as visualized.  No cardiomegaly.    Mediastinum:  Unremarkable as visualized.  Normal mediastinal contour.    Bones/joints:  Unremarkable as visualized.  No acute fracture.       Impression:        Patchy bilateral airspace disease.     This report was finalized on 2/26/2025 9:42 AM by Dr. Theodore Bautista MD.               Cultures:  No results found for: \"BLOODCX\", \"URINECX\", \"WOUNDCX\", \"MRSACX\", \"RESPCX\", \"STOOLCX\"    Last echocardiogram:  Results for orders placed during the hospital encounter of 09/29/23    Adult Transthoracic Echo Complete w/ Color, Spectral and Contrast if necessary per protocol    Interpretation Summary    Normal left ventricular cavity size and wall thickness noted. All left ventricular wall segments contract normally.    Left ventricular ejection fraction appears to be 61 - 65%.    The aortic valve is structurally normal with no stenosis present. Trace aortic valve " "regurgitation is present.    The mitral valve is structurally normal with no significant stenosis present. Mild mitral valve regurgitation is present.    . There is no evidence of pericardial effusion. .          I have personally reviewed the above radiology images and read the final radiology report on 02/26/25  ---------------------------------------------------------------------------------------------------------------------  Assessment / Plan     Active Hospital Problems    Diagnosis  POA    **Acute hypoxic respiratory failure [J96.01]  Yes       ASSESSMENT/PLAN:    Acute hypoxic respiratory failure  Pulmonary fibrosis  Concern for aspiration pneumonia versus DAH  SIRS versus sepsis  Patient presented to the ED after she woke this morning with SpO2 33% on home pulse oximeter.  She reports 1 week history of worsening shortness of breath and cough.  Cough has been largely nonproductive but she did have hemoptysis this morning.  Also with history of dysphagia and reporting she \"strangled\" on her dinner overnight.  On arrival to the ED she was saturating 44% on nonrebreather with heart rate 107.  She is afebrile.  Her lactate was 3.4, Pro-Brooks normal, D-dimer 1.73 and white blood cell count 21.42.  CT PE protocol negative for PE however did note diffuse bilateral pneumonia and given dense consolidation consider alveolar hemorrhage.  Will admit to the PCU for further workup and management  Obtain respiratory PCR, MRSA screen, sputum culture  Will continue antimicrobial coverage with Rocephin and azithromycin for now.  Consult pulmonology for further assistance and recommendations, appreciated.  Will continue high-dose Solu-Medrol per recs.  Patient will remain n.p.o. pending SLP consultation  Continue to monitor respiratory status closely-titrate supplemental O2 as needed and wean as able.  Continue as needed nebs  Trend labs    Chronic:  GERD  Thyroid disease  HTN  HLD  Continue home regimen as indicated once med " rec is complete  Monitor vital signs closely  ----------  -DVT prophylaxis: SCDs  -Activity: Ad true.  -Expected length of stay: INPATIENT status due to the need for care which can only be reasonably provided in an hospital setting such as aggressive/expedited ancillary services and/or consultation services, the necessity for IV medications, close physician monitoring and/or the possible need for procedures.  In such, I feel patient’s risk for adverse outcomes and need for care warrant INPATIENT evaluation and predict the patient’s care encounter to likely last beyond 2 midnights.   -Disposition pending further clinical course     High risk secondary to Respiratory failure, pulmonary fibrosis, concern for aspiration pna vs DAH    There are no questions and answers to display.       Felice Suazo PA-C   02/26/25  12:43 EST    Electronically signed by Lito Branch DO at 02/26/25 1608          Emergency Department Notes        Odette Edmondson PCT at 02/26/25 0842          EKG completed @ 0839. Given to Dr. Damian.    Electronically signed by Odette Edmondson PCT at 02/26/25 0843       Facility-Administered Medications as of 2/27/2025   Medication Dose Route Frequency Provider Last Rate Last Admin    albuterol (PROVENTIL) nebulizer solution 0.083% 2.5 mg/3mL  2.5 mg Nebulization Q4H PRN Lito Branch DO   2.5 mg at 02/27/25 0647    amLODIPine (NORVASC) tablet 5 mg  5 mg Oral Q24H Lito Branch DO        And    lisinopril (PRINIVIL,ZESTRIL) tablet 40 mg  40 mg Oral Q24H Lito Branch DO   40 mg at 02/26/25 1836    aspirin EC tablet 81 mg  81 mg Oral Daily Lito Branch DO   81 mg at 02/26/25 1834    atenolol (TENORMIN) tablet 25 mg  25 mg Oral Daily Lito Branch DO        atorvastatin (LIPITOR) tablet 10 mg  10 mg Oral Daily Lito Branch DO        azithromycin (ZITHROMAX) 500 mg in sodium chloride 0.9 % 250 mL IVPB-VTB  500 mg Intravenous  Q24H Lito Branch DO   500 mg at 02/26/25 1359    sennosides-docusate (PERICOLACE) 8.6-50 MG per tablet 2 tablet  2 tablet Oral BID PRN Lito Branch DO        And    polyethylene glycol (MIRALAX) packet 17 g  17 g Oral Daily PRN Lito Branch DO        And    bisacodyl (DULCOLAX) EC tablet 5 mg  5 mg Oral Daily PRN Lito Branch DO        And    bisacodyl (DULCOLAX) suppository 10 mg  10 mg Rectal Daily PRN Lito Branch DO        calcium carbonate (TUMS) chewable tablet 500 mg (200 mg elemental)  2 tablet Oral BID PRN Lito Branch DO        cefTRIAXone (ROCEPHIN) 2,000 mg in sodium chloride 0.9 % 100 mL IVPB-VTB  2,000 mg Intravenous Q24H Lito Branch  mL/hr at 02/26/25 1518 2,000 mg at 02/26/25 1518    cetirizine (zyrTEC) tablet 10 mg  10 mg Oral Daily SarinaLito ogden DO        cholecalciferol (VITAMIN D3) capsule 50,000 Units  50,000 Units Oral Weekly Lito Branch DO        clonazePAM (KlonoPIN) tablet 0.5 mg  0.5 mg Oral BID PRN Lito Branch DO        cloNIDine (CATAPRES) tablet 0.2 mg  0.2 mg Oral Q8H Lito Branch DO        dextrose (D50W) (25 g/50 mL) IV injection 25 g  25 g Intravenous Q15 Min PRN Lito Branch DO        dextrose (GLUTOSE) oral gel 15 g  15 g Oral Q15 Min PRN Lito Branch DO        donepezil (ARICEPT) tablet 10 mg  10 mg Oral Nightly SarinaLito ogden DO        DULoxetine (CYMBALTA) DR capsule 60 mg  60 mg Oral Daily SarinaLito ogden DO        estradiol (ESTRACE) tablet 1 mg  1 mg Oral Daily Lito Branch DO   1 mg at 02/26/25 1832    FLUoxetine (PROzac) capsule 10 mg  10 mg Oral Daily Lito Branch DO        [COMPLETED] furosemide (LASIX) injection 40 mg  40 mg Intravenous Once Maikel Trevino MD   40 mg at 02/26/25 1838    glucagon HCl (Diagnostic) injection 1 mg  1 mg Intramuscular Q15 Min PRN Lito Branch,  DO        hydrALAZINE (APRESOLINE) injection 10 mg  10 mg Intravenous Q6H PRN Lito Branch DO   10 mg at 02/27/25 0414    Insulin Lispro (humaLOG) injection 2-7 Units  2-7 Units Subcutaneous 4x Daily AC & at Bedtime Lito Branch DO   2 Units at 02/26/25 1838    [COMPLETED] iopamidol (ISOVUE-370) 76 % injection 100 mL  100 mL Intravenous Once in imaging Lazaro Damian MD   70 mL at 02/26/25 1005    ipratropium (ATROVENT) nebulizer solution 0.5 mg  0.5 mg Nebulization Q6H PRN Lito Branch DO   0.5 mg at 02/26/25 1856    [COMPLETED] ipratropium-albuterol (DUO-NEB) nebulizer solution 3 mL  3 mL Nebulization Once Lazaro Damian MD   3 mL at 02/26/25 0915    isosorbide mononitrate (IMDUR) 24 hr tablet 30 mg  30 mg Oral Daily Lito Branch DO        levothyroxine (SYNTHROID, LEVOTHROID) tablet 100 mcg  100 mcg Oral QAM AC Lito Branch DO        lubiprostone (AMITIZA) capsule 8 mcg  8 mcg Oral BID Lito Branch DO        methylPREDNISolone sodium succinate (SOLU-Medrol) 1,000 mg in sodium chloride 0.9 % 100 mL IVPB  1,000 mg Intravenous Daily Maikel Trevino  mL/hr at 02/26/25 1953 1,000 mg at 02/26/25 1953    [COMPLETED] methylPREDNISolone sodium succinate (SOLU-Medrol) 125 mg in sterile water (preservative free) 2 mL  125 mg Intravenous Once Lazaro Damian MD   125 mg at 02/26/25 0919    nitroglycerin (NITROSTAT) SL tablet 0.4 mg  0.4 mg Sublingual Q5 Min PRN Lito Branch DO        nystatin (MYCOSTATIN) ointment 1 Application  1 Application Topical BID Lito Branch DO   1 Application at 02/26/25 2054    pantoprazole (PROTONIX) EC tablet 40 mg  40 mg Oral Q AM Lito Branch DO        [COMPLETED] piperacillin-tazobactam (ZOSYN) IVPB 3.375 g IVPB in 100 mL NS (VTB)  3.375 g Intravenous Once Lazaro Damian MD   Stopped at 02/26/25 1230    potassium chloride (KLOR-CON) packet 40 mEq  40 mEq Oral Once  Maikel Trevino MD        rOPINIRole (REQUIP) tablet 0.5 mg  0.5 mg Oral Daily Lito Branch,         sodium chloride 0.9 % flush 10 mL  10 mL Intravenous PRN Lazaro Damian MD        sodium chloride 0.9 % flush 10 mL  10 mL Intravenous Q12H SarinaLito oneal, DO   10 mL at 02/26/25 2055    sodium chloride 0.9 % flush 10 mL  10 mL Intravenous PRN SarinaLito oneal, DO        sodium chloride 0.9 % flush 10 mL  10 mL Intravenous Q12H SarinaLito oneal, DO   10 mL at 02/26/25 2055    sodium chloride 0.9 % flush 10 mL  10 mL Intravenous PRN Lito Branch,         sodium chloride 0.9 % infusion 40 mL  40 mL Intravenous PRN Lito Branch,         sodium chloride 0.9 % infusion 40 mL  40 mL Intravenous PRN Lito Branch, DO        spironolactone (ALDACTONE) tablet 50 mg  50 mg Oral BID Lito Branch DO         Physician Progress Notes (all)    No notes of this type exist for this encounter.          Consult Notes (all)        Maikel Trevino MD at 02/26/25 1638        Consult Orders    1. Inpatient Pulmonology Consult [[id number]] ordered by Lito Branch DO at 02/26/25 1249                     Referring Provider: Dr. Avila  Reason for Consultation: Abnormal CT chest, ILD and DAH      Chief complaint -shortness of breath and coughing up blood      History of present illness:  -Patient is a 79-year-old female with past medical history positive for arthritis, thyroid disease, elevated cholesterol, GERD, hyperlipidemia, hypertension and pulmonary fibrosis.  Presented with worsening shortness of breath and coughing up blood    CT of the chest reviewed discussed with patient and patient's .  Answered their questions to their satisfaction.  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  Currently patient is on 60% and 60 L on high flow nasal cannula oxygen.    All the labs medications ins and outs  vitals treatment given in the ER then on the floor reviewed.    Review of Systems  History obtained from chart review and the patient  General ROS: negative for - chills, fatigue or fever  Psychological ROS: negative for - anxiety or depression  ENT ROS: negative for - headaches, visual changes or vocal changes  Respiratory ROS: positive for - coughing up blood and shortness of breath  Cardiovascular ROS: no chest pain or dyspnea on exertion  Gastrointestinal ROS: no abdominal pain, change in bowel habits, or black or bloody stools  Musculoskeletal ROS: negative for - joint pain, joint stiffness or joint swelling  Neurological ROS: no TIA or stroke symptoms  Hematological: no bleeding  Skin: no bruises, no rash        History  Past Medical History:   Diagnosis Date    Arthritis     Disease of thyroid gland     Elevated cholesterol     GERD (gastroesophageal reflux disease)     Hyperlipidemia     Hypertension     Migraine     Pulmonary fibrosis     Stroke    ,   Past Surgical History:   Procedure Laterality Date    CYSTOSCOPY W/ LASER LITHOTRIPSY  01/15/2024    ENDOSCOPY N/A 12/12/2024    Procedure: ESOPHAGOGASTRODUODENOSCOPY WITH BIOPSY;  Surgeon: Jaky Goddard MD;  Location: [facility];  Service: Gastroenterology;  Laterality: N/A;  dilated to 20mm    HYSTERECTOMY  age 38    TUBAL ABDOMINAL LIGATION Bilateral    ,   Family History   Problem Relation Age of Onset    Heart failure Father     Heart disease Sister     Heart attack Sister     Breast cancer Neg Hx    ,   Social History     Tobacco Use    Smoking status: Never     Passive exposure: Never    Smokeless tobacco: Never   Vaping Use    Vaping status: Never Used   Substance Use Topics    Alcohol use: Never    Drug use: Never   ,   Medications Prior to Admission   Medication Sig Dispense Refill Last Dose/Taking    amLODIPine-benazepril (LOTREL) 5-40 MG per capsule Take 1 capsule by mouth Daily.   2/25/2025    aspirin 81 MG EC tablet Take 1 tablet by  mouth Daily.   2/25/2025 Morning    atenolol (TENORMIN) 25 MG tablet Take 1 tablet by mouth Daily.   2/25/2025    cetirizine (zyrTEC) 10 MG tablet Take 1 tablet by mouth Daily.   2/25/2025    cloNIDine (CATAPRES) 0.2 MG tablet Take 1 tablet by mouth 3 times a day.   2/25/2025    donepezil (ARICEPT) 10 MG tablet Take 1 tablet by mouth Every Night.   2/25/2025    DULoxetine (CYMBALTA) 60 MG capsule Take 1 capsule by mouth Daily.   2/25/2025    Emgality 120 MG/ML auto-injector pen Inject 1 mL under the skin into the appropriate area as directed Every 30 (Thirty) Days.   Past Week    estradiol (ESTRACE) 1 MG tablet Take 1 tablet by mouth Daily.   2/25/2025    FLUoxetine (PROzac) 10 MG capsule Take 1 capsule by mouth Daily.   2/25/2025    isosorbide mononitrate (IMDUR) 30 MG 24 hr tablet Take 1 tablet by mouth Daily. 30 tablet 11 2/25/2025    levothyroxine (SYNTHROID, LEVOTHROID) 100 MCG tablet Take 1 tablet by mouth Daily.   2/25/2025    Linzess 145 MCG capsule capsule Take 1 capsule by mouth Every Morning Before Breakfast.   2/25/2025    lovastatin (MEVACOR) 20 MG tablet Take 1 tablet by mouth Every Evening.   2/25/2025    nystatin (MYCOSTATIN) 394890 UNIT/GM ointment Apply 1 Application topically to the appropriate area as directed 2 (Two) Times a Day.   2/25/2025    omega-3 acid ethyl esters (LOVAZA) 1 g capsule Take 2 capsules by mouth 2 (Two) Times a Day.   2/25/2025    omeprazole (priLOSEC) 40 MG capsule Take 1 capsule by mouth 2 (Two) Times a Day. 60 capsule 3 2/25/2025    rOPINIRole (REQUIP) 0.5 MG tablet Take 1 tablet by mouth Daily.   2/25/2025    spironolactone (ALDACTONE) 50 MG tablet Take 1 tablet by mouth 2 (Two) Times a Day.   2/25/2025    Tirzepatide 10 MG/0.5ML solution auto-injector Inject 10 mg under the skin into the appropriate area as directed 1 (One) Time Per Week.   Past Week    vitamin D (ERGOCALCIFEROL) 1.25 MG (03903 UT) capsule capsule Take 1 capsule by mouth 1 (One) Time Per Week.   Past  Week    albuterol sulfate  (90 Base) MCG/ACT inhaler Inhale 2 puffs Every 4 (Four) Hours As Needed for Wheezing or Shortness of Air.   Unknown    clonazePAM (KlonoPIN) 0.5 MG tablet Take 1 tablet by mouth 2 (Two) Times a Day As Needed for Anxiety.   Unknown    ipratropium-albuterol (DUO-NEB) 0.5-2.5 mg/3 ml nebulizer Take 3 mL by nebulization Every 4 (Four) Hours As Needed for Wheezing. 360 mL 0 Unknown   , Scheduled Meds:  amLODIPine, 5 mg, Oral, Q24H   And  lisinopril, 40 mg, Oral, Q24H  aspirin, 81 mg, Oral, Daily  atenolol, 25 mg, Oral, Daily  atorvastatin, 10 mg, Oral, Daily  azithromycin, 500 mg, Intravenous, Q24H  cefTRIAXone, 2,000 mg, Intravenous, Q24H  cetirizine, 10 mg, Oral, Daily  [START ON 2/27/2025] cholecalciferol, 50,000 Units, Oral, Weekly  cloNIDine, 0.2 mg, Oral, Q8H  donepezil, 10 mg, Oral, Nightly  DULoxetine, 60 mg, Oral, Daily  estradiol, 1 mg, Oral, Daily  FLUoxetine, 10 mg, Oral, Daily  insulin lispro, 2-7 Units, Subcutaneous, 4x Daily AC & at Bedtime  isosorbide mononitrate, 30 mg, Oral, Daily  levothyroxine, 100 mcg, Oral, QAM AC  lubiprostone, 8 mcg, Oral, BID  methylPREDNISolone sodium succinate, 80 mg, Intravenous, Q6H  nystatin, 1 Application, Topical, BID  [START ON 2/27/2025] pantoprazole, 40 mg, Oral, Q AM  rOPINIRole, 0.5 mg, Oral, Daily  sodium chloride, 10 mL, Intravenous, Q12H  sodium chloride, 10 mL, Intravenous, Q12H  spironolactone, 50 mg, Oral, BID    , Continuous Infusions:    and Allergies:  Ampicillin and Clonidine    Objective     Vital Signs   Temp:  [97.4 °F (36.3 °C)-98.9 °F (37.2 °C)] 98.4 °F (36.9 °C)  Heart Rate:  [] 76  Resp:  [20-35] 29  BP: (152-180)/(70-90) 163/70    Physical Exam:             General-acutely ill in appearance, not in any acute distress    HEENT- pupils equally reactive to light, normal in size, no scleral icterus    Neck-supple    Respiratory-respirations normal-on auscultation no wheezing no crackles, on high flow nasal cannula  oxygen    Cardiovascular-  Normal S1 and S2. No S3, S4 or murmurs. No JVD, no carotid bruit and no edema, pulses normal bilaterally     GI-nontender nondistended bowel sounds positive    CNS-nonfocal    Musculoskeletal -no edema  Extremities- no obvious deformity noticed     Psychiatric-mood good, good eye contact, alert awake oriented  Skin- no visible rash                                                                   Results Review:    LABS:    Lab Results   Component Value Date    GLUCOSE 184 (H) 02/26/2025    BUN 11 02/26/2025    CREATININE 0.79 02/26/2025    EGFRIFNONA 66 02/12/2021    BCR 13.9 02/26/2025    CO2 26.5 02/26/2025    CALCIUM 9.3 02/26/2025    ALBUMIN 3.9 02/26/2025    AST 54 (H) 02/26/2025    ALT 23 02/26/2025    WBC 21.42 (H) 02/26/2025    HGB 13.3 02/26/2025    HCT 40.9 02/26/2025    MCV 91.1 02/26/2025     02/26/2025     02/26/2025    K 4.2 02/26/2025     02/26/2025    ANIONGAP 12.5 02/26/2025       Lab Results   Component Value Date    INR 0.95 12/26/2024    PROTIME 10.3 12/26/2024                I reviewed the patient's new clinical results.  I reviewed the patient's new imaging results and agree with the interpretation.      Microbiology Results (last 10 days)       Procedure Component Value - Date/Time    Respiratory Panel PCR w/COVID-19(SARS-CoV-2) KORY/LESIA/TAWNY/PAD/COR/ENID In-House, NP Swab in UTM/VTM, 2 HR TAT - Swab, Nasopharynx [[id number]]  (Normal) Collected: 02/26/25 1321    Lab Status: Final result Specimen: Swab from Nasopharynx Updated: 02/26/25 1424     ADENOVIRUS, PCR Not Detected     Coronavirus 229E Not Detected     Coronavirus HKU1 Not Detected     Coronavirus NL63 Not Detected     Coronavirus OC43 Not Detected     COVID19 Not Detected     Human Metapneumovirus Not Detected     Human Rhinovirus/Enterovirus Not Detected     Influenza A PCR Not Detected     Influenza B PCR Not Detected     Parainfluenza Virus 1 Not Detected     Parainfluenza Virus 2 Not  Detected     Parainfluenza Virus 3 Not Detected     Parainfluenza Virus 4 Not Detected     RSV, PCR Not Detected     Bordetella pertussis pcr Not Detected     Bordetella parapertussis PCR Not Detected     Chlamydophila pneumoniae PCR Not Detected     Mycoplasma pneumo by PCR Not Detected    Narrative:      In the setting of a positive respiratory panel with a viral infection PLUS a negative procalcitonin without other underlying concern for bacterial infection, consider observing off antibiotics or discontinuation of antibiotics and continue supportive care. If the respiratory panel is positive for atypical bacterial infection (Bordetella pertussis, Chlamydophila pneumoniae, or Mycoplasma pneumoniae), consider antibiotic de-escalation to target atypical bacterial infection.    MRSA Screen, PCR (Inpatient) - Swab, Nares [[id number]]  (Normal) Collected: 02/26/25 1321    Lab Status: Final result Specimen: Swab from Nares Updated: 02/26/25 1445     MRSA PCR No MRSA Detected    Narrative:      The negative predictive value of this diagnostic test is high and should only be used to consider de-escalating anti-MRSA therapy. A positive result may indicate colonization with MRSA and must be correlated clinically.            Latest Reference Range & Units 02/26/25 08:39   pH, Arterial 7.350 - 7.450 pH units 7.450   pCO2, Arterial 35.0 - 45.0 mm Hg 40.2   pO2, Arterial 83.0 - 108.0 mm Hg 77.1 (L)   HCO3, Arterial 20.0 - 26.0 mmol/L 27.9 (H)   Base Excess 0.0 - 2.0 mmol/L 3.6 (H)   O2 Saturation, Arterial 94.0 - 99.0 % 96.2   CO2 Content 22 - 33 mmol/L 29.1   A-a DO2 0.0 - 300.0 mmHg 561.3 (H)   Carboxyhemoglobin 0 - 5 % 1.7   Methemoglobin 0.00 - 3.00 % 0.20   Oxyhemoglobin 94 - 99 % 94.4   Hematocrit, Blood Gas 38.0 - 51.0 % 39.1   Hemoglobin, Blood Gas 13.5 - 17.5 g/dL 12.8 (L)   Site  Right Brachial   Imer's Test  N/A   Modality  NRB   FIO2 % 100   Ventilator Mode  NA   Barometric Pressure for Blood Gas mmHg 726   (L):  Data is abnormally low  (H): Data is abnormally high  This result has an attachment that is not available.  Pulmonary Function Testing Report    Gregoria Pendleton 78 y.o. [facility]    The patient underwent spirometry, lung volumes by body plethysmography,  and diffusion capacity testing testing.    All tests were appropriately administered via ATS/ERS criteria. Testing is  acceptable and repeatable.    Spirometry:  Proportionate reduction in FEV1 and FVC with a normal ratio suggestive of  a restrictive process.  There is no significant positive bronchodilator  response.    Lung Volumes:  Reduced TLC consistent with simple restriction.    Diffusion Capacity:  Diffusion capacity uncorrected for Hb is severely reduced. A reduced DLCO  with a low VA and low/normal KCO is a pattern that may suggest loss of  alveolar capillary structure with loss of lung volume in conditions such  as emphysema and ILD.  IVC was < 90% of VC per ATS guidelines.  Trend:  Compared to previous study dated 4/26/24, there has been no significant  change in FEV1 and FVC.  All Measurements     Exam End: 06/06/24 11:24    Specimen Collected: 06/06/24 07:33 Last Resulted: 06/06/24 17:02   Received From: [facility]  Result Received: 07/29/24 09:09      Assessment & Plan       Neurology-alert awake oriented no active issues going on    Respiratory-acute on chronic hypoxic respiratory failure-likely due to flareup of her interstitial lung disease.  Currently having diffuse alveolar hemorrhage.    Received steroids in the ER.  Increase the dose of steroids to 1 g IV Solu-Medrol daily for next 3 days then will slowly taper.  Vent settings   Give diuretics to improve lung compliance and diffusion capacity.    Chest x-ray-latest reviewed    ABG-latest reviewed    If respiratory status gets worse can start on BiPAP with settings 20 x 10  Respiratory rate 20  Adjust FiO2 to maintain saturation  92 to 95%  If blood pressure is good-  Can also consider giving a dose of diuretics.    Cardiology- hemodynamically -stable.  Continue to monitor HR- rate and rhythm, BP     Nephrology- Cr and BUN stable  I/O-reviewed    GI-if improves can start on a diet    Hematology- CBC  Hb  platelet  WBC    ID  Culture  And Antibiotics    Endrocrinology- Maintain Blood sugar 140 -180      Electrolytes-   Mag and phos       DVT prophylaxis-  Continue  Bedside rounds were done with RT and patient's nurse. All the lab and clinical findings were discussed with them and plan was also discussed in great detail.    Family member present- at bedside answered his questions to satisfaction.  Overall the labs and CAT scan of the chest.        Echo-  Results for orders placed during the hospital encounter of 09/29/23    Adult Transthoracic Echo Complete w/ Color, Spectral and Contrast if necessary per protocol    Interpretation Summary    Normal left ventricular cavity size and wall thickness noted. All left ventricular wall segments contract normally.    Left ventricular ejection fraction appears to be 61 - 65%.    The aortic valve is structurally normal with no stenosis present. Trace aortic valve regurgitation is present.    The mitral valve is structurally normal with no significant stenosis present. Mild mitral valve regurgitation is present.    . There is no evidence of pericardial effusion. .  Currently patient is critically ill due to hypoxic respiratory failure requiring very high settings of high flow nasal cannula oxygen flareup of the ILD and diffuse alveolar hemorrhage.  Patient is very high risk for respiratory failure will monitor closely and might need intubation.          Acute hypoxic respiratory failure          Maikel Trevino MD  02/26/25  16:38 EST         Electronically signed by Maikel Trevino MD at 02/26/25 9167

## 2025-02-27 NOTE — PROGRESS NOTES
[facility] HOSPITALIST PROGRESS NOTE    Subjective     History:   Gregoria Pendleton is a 79 y.o. female admitted on 2/26/2025 secondary to Acute hypoxic respiratory failure     Procedures: None    CC: Follow up resp failure     Patient seen and examined with GUY Seo. Awake and alert with her  present at bedside. Appears fatigued and states she did not sleep well last PM. Continues to report dyspnea. No further episodes of hemoptysis reported. Episode of dysuria last PM. No reported vomiting. Remains on heated HFNC. No acute events reported.     History taken from: patient, chart, and RN.      Objective     Vital Signs  Temp:  [98.1 °F (36.7 °C)-99.2 °F (37.3 °C)] 99.2 °F (37.3 °C)  Heart Rate:  [] 58  Resp:  [12-36] 23  BP: ()/() 78/40    Intake/Output Summary (Last 24 hours) at 2/27/2025 1542  Last data filed at 2/27/2025 0200  Gross per 24 hour   Intake --   Output 1300 ml   Net -1300 ml         Physical Exam:  General:    Awake, alert, in no acute distress, ill appearing   Heart:      Normal S1 and S2. Regular rate and rhythm. No significant murmur, rubs or gallops appreciated.   Lungs:     Respirations regular, even and unlabored. Diminished breath sounds with faint crackles.    Abdomen:   Soft and nontender. No guarding, rebound tenderness or  organomegaly noted. Bowel sounds present x 4.   Extremities:  No clubbing, cyanosis or edema noted. Moves UE and LE equally B/L.     Results Review:    Results from last 7 days   Lab Units 02/27/25  0058 02/26/25  0901   WBC 10*3/mm3 17.35* 21.42*   HEMOGLOBIN g/dL 12.6 13.3   PLATELETS 10*3/mm3 328 346     Results from last 7 days   Lab Units 02/27/25  0058 02/26/25  0901   SODIUM mmol/L 141 141   POTASSIUM mmol/L 3.3* 4.2   CHLORIDE mmol/L 102 102   CO2 mmol/L 23.2 26.5   BUN mg/dL 13 11   CREATININE mg/dL 0.84 0.79   CALCIUM mg/dL 9.1 9.3   GLUCOSE mg/dL 182* 184*     Results from last 7 days   Lab Units 02/27/25  0058  02/26/25  0901   BILIRUBIN mg/dL 0.5 0.5   ALK PHOS U/L 110 120*   AST (SGOT) U/L 38* 54*   ALT (SGPT) U/L 21 23     Results from last 7 days   Lab Units 02/27/25  0058   MAGNESIUM mg/dL 2.1     Results from last 7 days   Lab Units 02/26/25  1843   INR  1.19*     Results from last 7 days   Lab Units 02/26/25  0956 02/26/25  0901   HSTROP T ng/L 33* 28*       Imaging Results (Last 24 Hours)       Procedure Component Value Units Date/Time    CT Abdomen Pelvis Without Contrast [[id number]] Resulted: 02/26/25 1948     Updated: 02/26/25 2001              Medications:  amLODIPine, 5 mg, Oral, Q24H   And  lisinopril, 40 mg, Oral, Q24H  aspirin, 81 mg, Oral, Daily  atenolol, 25 mg, Oral, Daily  atorvastatin, 10 mg, Oral, Daily  azithromycin, 500 mg, Intravenous, Q24H  cefTRIAXone, 2,000 mg, Intravenous, Q24H  cetirizine, 10 mg, Oral, Daily  cholecalciferol, 50,000 Units, Oral, Weekly  cloNIDine, 0.2 mg, Oral, Q8H  donepezil, 10 mg, Oral, Nightly  DULoxetine, 60 mg, Oral, Daily  estradiol, 1 mg, Oral, Daily  FLUoxetine, 10 mg, Oral, Daily  insulin lispro, 2-7 Units, Subcutaneous, 4x Daily AC & at Bedtime  isosorbide mononitrate, 30 mg, Oral, Daily  lactated ringers, 250 mL, Intravenous, Once  levothyroxine, 100 mcg, Oral, QAM AC  lubiprostone, 8 mcg, Oral, BID  melatonin, 10 mg, Oral, Nightly  methylPREDNISolone sodium succinate, 1,000 mg, Intravenous, Daily  nystatin, 1 Application, Topical, BID  pantoprazole, 40 mg, Oral, Q AM  potassium chloride, 40 mEq, Oral, Once  rOPINIRole, 0.5 mg, Oral, Daily  sodium chloride, 10 mL, Intravenous, Q12H  sodium chloride, 10 mL, Intravenous, Q12H  spironolactone, 50 mg, Oral, BID               Assessment & Plan   Acute on chronic hypoxic respiratory failure: Likely 2/2 exacerbation of pulmonary fibrosis with concern for DAH. Concern for possible pneumonia on CT with no evidence of PE. Cont high dose steroids. Cont diuresis as tolerated to assist with lung compliance. Currently on  azithromycin and Rocephin empirically. Currently on heated HFNC. Cont to monitor closely. Pulm input appreciated.     Essential HTN: BP has been elevated with home medications resumed on admission with PRN regimen added as well. Diuresis as above. Subsequently hypotensive this afternoon and ordered small fluid bolus. Cont to monitor.     Hyperglycemia: Steroids likely contributing. Check HgbA1c. Cont SSI with Accuchecks.     HLD: Cont statin.     Hypothyroidism: Cont levothyroxine.     GERD: Cont PPI.     DVT PPX: SCD's     Discussed with Dr. Trevino.     Disposition Pending clinical course. May require several days of hospitalization in the setting of increased O2 requirements.     Lito Branch, DO  02/27/25  15:42 EST

## 2025-02-27 NOTE — PROGRESS NOTES
Referring Provider: Dr. Avila  Reason for Consultation: Abnormal CT chest, ILD and DAH      Chief complaint -shortness of breath and coughing up blood      Sub-overnight events reviewed.  All the labs medications ins and outs and vitals reviewed.  Resting in bed comfortably.  Not in any distress.   at bedside.  Remains on high flow nasal cannula oxygen.  Not coughing up any more blood.    Review of Systems   Still coughing up but is clear phlegm now..    Otherwise negative    History  Past Medical History:   Diagnosis Date    Arthritis     Disease of thyroid gland     Elevated cholesterol     GERD (gastroesophageal reflux disease)     Hyperlipidemia     Hypertension     Migraine     Pulmonary fibrosis     Stroke    ,   Past Surgical History:   Procedure Laterality Date    CYSTOSCOPY W/ LASER LITHOTRIPSY  01/15/2024    ENDOSCOPY N/A 12/12/2024    Procedure: ESOPHAGOGASTRODUODENOSCOPY WITH BIOPSY;  Surgeon: Jaky Goddard MD;  Location: [facility];  Service: Gastroenterology;  Laterality: N/A;  dilated to 20mm    HYSTERECTOMY  age 38    TUBAL ABDOMINAL LIGATION Bilateral    ,   Family History   Problem Relation Age of Onset    Heart failure Father     Heart disease Sister     Heart attack Sister     Breast cancer Neg Hx    ,   Social History     Tobacco Use    Smoking status: Never     Passive exposure: Never    Smokeless tobacco: Never   Vaping Use    Vaping status: Never Used   Substance Use Topics    Alcohol use: Never    Drug use: Never   ,   Medications Prior to Admission   Medication Sig Dispense Refill Last Dose/Taking    amLODIPine-benazepril (LOTREL) 5-40 MG per capsule Take 1 capsule by mouth Daily.   2/25/2025    aspirin 81 MG EC tablet Take 1 tablet by mouth Daily.   2/25/2025 Morning    atenolol (TENORMIN) 25 MG tablet Take 1 tablet by mouth Daily.   2/25/2025    cetirizine (zyrTEC) 10 MG tablet Take 1 tablet by mouth Daily.   2/25/2025    cloNIDine (CATAPRES) 0.2 MG tablet Take 1  tablet by mouth 3 times a day.   2/25/2025    donepezil (ARICEPT) 10 MG tablet Take 1 tablet by mouth Every Night.   2/25/2025    DULoxetine (CYMBALTA) 60 MG capsule Take 1 capsule by mouth Daily.   2/25/2025    Emgality 120 MG/ML auto-injector pen Inject 1 mL under the skin into the appropriate area as directed Every 30 (Thirty) Days.   Past Week    estradiol (ESTRACE) 1 MG tablet Take 1 tablet by mouth Daily.   2/25/2025    FLUoxetine (PROzac) 10 MG capsule Take 1 capsule by mouth Daily.   2/25/2025    isosorbide mononitrate (IMDUR) 30 MG 24 hr tablet Take 1 tablet by mouth Daily. 30 tablet 11 2/25/2025    levothyroxine (SYNTHROID, LEVOTHROID) 100 MCG tablet Take 1 tablet by mouth Daily.   2/25/2025    Linzess 145 MCG capsule capsule Take 1 capsule by mouth Every Morning Before Breakfast.   2/25/2025    lovastatin (MEVACOR) 20 MG tablet Take 1 tablet by mouth Every Evening.   2/25/2025    nystatin (MYCOSTATIN) 557399 UNIT/GM ointment Apply 1 Application topically to the appropriate area as directed 2 (Two) Times a Day.   2/25/2025    omega-3 acid ethyl esters (LOVAZA) 1 g capsule Take 2 capsules by mouth 2 (Two) Times a Day.   2/25/2025    omeprazole (priLOSEC) 40 MG capsule Take 1 capsule by mouth 2 (Two) Times a Day. 60 capsule 3 2/25/2025    rOPINIRole (REQUIP) 0.5 MG tablet Take 1 tablet by mouth Daily.   2/25/2025    spironolactone (ALDACTONE) 50 MG tablet Take 1 tablet by mouth 2 (Two) Times a Day.   2/25/2025    Tirzepatide 10 MG/0.5ML solution auto-injector Inject 10 mg under the skin into the appropriate area as directed 1 (One) Time Per Week.   Past Week    vitamin D (ERGOCALCIFEROL) 1.25 MG (44676 UT) capsule capsule Take 1 capsule by mouth 1 (One) Time Per Week.   Past Week    albuterol sulfate  (90 Base) MCG/ACT inhaler Inhale 2 puffs Every 4 (Four) Hours As Needed for Wheezing or Shortness of Air.   Unknown    clonazePAM (KlonoPIN) 0.5 MG tablet Take 1 tablet by mouth 2 (Two) Times a Day As  Needed for Anxiety.   Unknown    ipratropium-albuterol (DUO-NEB) 0.5-2.5 mg/3 ml nebulizer Take 3 mL by nebulization Every 4 (Four) Hours As Needed for Wheezing. 360 mL 0 Unknown   , Scheduled Meds:  amLODIPine, 5 mg, Oral, Q24H   And  lisinopril, 40 mg, Oral, Q24H  aspirin, 81 mg, Oral, Daily  atenolol, 25 mg, Oral, Daily  atorvastatin, 10 mg, Oral, Daily  azithromycin, 500 mg, Intravenous, Q24H  cefTRIAXone, 2,000 mg, Intravenous, Q24H  cetirizine, 10 mg, Oral, Daily  cholecalciferol, 50,000 Units, Oral, Weekly  cloNIDine, 0.2 mg, Oral, Q8H  donepezil, 10 mg, Oral, Nightly  DULoxetine, 60 mg, Oral, Daily  estradiol, 1 mg, Oral, Daily  FLUoxetine, 10 mg, Oral, Daily  insulin lispro, 2-7 Units, Subcutaneous, 4x Daily AC & at Bedtime  isosorbide mononitrate, 30 mg, Oral, Daily  levothyroxine, 100 mcg, Oral, QAM AC  lubiprostone, 8 mcg, Oral, BID  melatonin, 10 mg, Oral, Nightly  methylPREDNISolone sodium succinate, 1,000 mg, Intravenous, Daily  nystatin, 1 Application, Topical, BID  pantoprazole, 40 mg, Oral, Q AM  potassium chloride, 40 mEq, Oral, Once  rOPINIRole, 0.5 mg, Oral, Daily  sodium chloride, 10 mL, Intravenous, Q12H  sodium chloride, 10 mL, Intravenous, Q12H  spironolactone, 50 mg, Oral, BID    , Continuous Infusions:    and Allergies:  Ampicillin and Clonidine    Objective     Vital Signs   Temp:  [98.1 °F (36.7 °C)-99.2 °F (37.3 °C)] 99.2 °F (37.3 °C)  Heart Rate:  [] 70  Resp:  [12-36] 19  BP: (138-185)/() 149/73    Physical Exam:             General-acutely ill in appearance, not in any acute distress  On high flow nasal cannula oxygen  HEENT- pupils equally reactive to light, normal in size, no scleral icterus    Neck-supple    Respiratory-respirations normal-on auscultation no wheezing no crackles, on high flow nasal cannula oxygen    Cardiovascular-  Normal S1 and S2. No S3, S4 or murmurs. No JVD, no carotid bruit and no edema, pulses normal bilaterally     GI-nontender nondistended  bowel sounds positive    CNS-nonfocal    Musculoskeletal -no edema  Extremities- no obvious deformity noticed     Psychiatric-mood good, good eye contact, alert awake oriented  Skin- no visible rash                                                                   Results Review:    LABS:    Lab Results   Component Value Date    GLUCOSE 182 (H) 02/27/2025    BUN 13 02/27/2025    CREATININE 0.84 02/27/2025    EGFRIFNONA 66 02/12/2021    BCR 15.5 02/27/2025    CO2 23.2 02/27/2025    CALCIUM 9.1 02/27/2025    ALBUMIN 3.3 (L) 02/27/2025    AST 38 (H) 02/27/2025    ALT 21 02/27/2025    WBC 17.35 (H) 02/27/2025    HGB 12.6 02/27/2025    HCT 38.7 02/27/2025    MCV 89.6 02/27/2025     02/27/2025     02/27/2025    K 3.3 (L) 02/27/2025     02/27/2025    ANIONGAP 15.8 (H) 02/27/2025       Lab Results   Component Value Date    INR 1.19 (H) 02/26/2025    INR 0.95 12/26/2024    PROTIME 15.2 (H) 02/26/2025    PROTIME 10.3 12/26/2024       Results from last 7 days   Lab Units 02/26/25  1843   INR  1.19*          I reviewed the patient's new clinical results.  I reviewed the patient's new imaging results and agree with the interpretation.      Microbiology Results (last 10 days)       Procedure Component Value - Date/Time    Respiratory Panel PCR w/COVID-19(SARS-CoV-2) KORY/LESIA/TAWNY/PAD/COR/ENID In-House, NP Swab in UTM/VTM, 2 HR TAT - Swab, Nasopharynx [[id number]]  (Normal) Collected: 02/26/25 1321    Lab Status: Final result Specimen: Swab from Nasopharynx Updated: 02/26/25 1424     ADENOVIRUS, PCR Not Detected     Coronavirus 229E Not Detected     Coronavirus HKU1 Not Detected     Coronavirus NL63 Not Detected     Coronavirus OC43 Not Detected     COVID19 Not Detected     Human Metapneumovirus Not Detected     Human Rhinovirus/Enterovirus Not Detected     Influenza A PCR Not Detected     Influenza B PCR Not Detected     Parainfluenza Virus 1 Not Detected     Parainfluenza Virus 2 Not Detected     Parainfluenza  Virus 3 Not Detected     Parainfluenza Virus 4 Not Detected     RSV, PCR Not Detected     Bordetella pertussis pcr Not Detected     Bordetella parapertussis PCR Not Detected     Chlamydophila pneumoniae PCR Not Detected     Mycoplasma pneumo by PCR Not Detected    Narrative:      In the setting of a positive respiratory panel with a viral infection PLUS a negative procalcitonin without other underlying concern for bacterial infection, consider observing off antibiotics or discontinuation of antibiotics and continue supportive care. If the respiratory panel is positive for atypical bacterial infection (Bordetella pertussis, Chlamydophila pneumoniae, or Mycoplasma pneumoniae), consider antibiotic de-escalation to target atypical bacterial infection.    MRSA Screen, PCR (Inpatient) - Swab, Nares [[id number]]  (Normal) Collected: 02/26/25 1321    Lab Status: Final result Specimen: Swab from Nares Updated: 02/26/25 1445     MRSA PCR No MRSA Detected    Narrative:      The negative predictive value of this diagnostic test is high and should only be used to consider de-escalating anti-MRSA therapy. A positive result may indicate colonization with MRSA and must be correlated clinically.    Blood Culture - Blood, Arm, Right [[id number]]  (Normal) Collected: 02/26/25 0941    Lab Status: Preliminary result Specimen: Blood from Arm, Right Updated: 02/27/25 1000     Blood Culture No growth at 24 hours    Blood Culture - Blood, Arm, Left [[id number]]  (Normal) Collected: 02/26/25 0941    Lab Status: Preliminary result Specimen: Blood from Arm, Left Updated: 02/27/25 1000     Blood Culture No growth at 24 hours            Latest Reference Range & Units 02/26/25 08:39   pH, Arterial 7.350 - 7.450 pH units 7.450   pCO2, Arterial 35.0 - 45.0 mm Hg 40.2   pO2, Arterial 83.0 - 108.0 mm Hg 77.1 (L)   HCO3, Arterial 20.0 - 26.0 mmol/L 27.9 (H)   Base Excess 0.0 - 2.0 mmol/L 3.6 (H)   O2 Saturation, Arterial 94.0 - 99.0 % 96.2   CO2 Content  22 - 33 mmol/L 29.1   A-a DO2 0.0 - 300.0 mmHg 561.3 (H)   Carboxyhemoglobin 0 - 5 % 1.7   Methemoglobin 0.00 - 3.00 % 0.20   Oxyhemoglobin 94 - 99 % 94.4   Hematocrit, Blood Gas 38.0 - 51.0 % 39.1   Hemoglobin, Blood Gas 13.5 - 17.5 g/dL 12.8 (L)   Site  Right Brachial   Imer's Test  N/A   Modality  NRB   FIO2 % 100   Ventilator Mode  NA   Barometric Pressure for Blood Gas mmHg 726   (L): Data is abnormally low  (H): Data is abnormally high  This result has an attachment that is not available.  Pulmonary Function Testing Report    Gregoria Pendleton 78 y.o. underwent pulmonary function testing today at the  [facility].    The patient underwent spirometry, lung volumes by body plethysmography,  and diffusion capacity testing testing.    All tests were appropriately administered via ATS/ERS criteria. Testing is  acceptable and repeatable.    Spirometry:  Proportionate reduction in FEV1 and FVC with a normal ratio suggestive of  a restrictive process.  There is no significant positive bronchodilator  response.    Lung Volumes:  Reduced TLC consistent with simple restriction.    Diffusion Capacity:  Diffusion capacity uncorrected for Hb is severely reduced. A reduced DLCO  with a low VA and low/normal KCO is a pattern that may suggest loss of  alveolar capillary structure with loss of lung volume in conditions such  as emphysema and ILD.  IVC was < 90% of VC per ATS guidelines.  Trend:  Compared to previous study dated 4/26/24, there has been no significant  change in FEV1 and FVC.  All Measurements     Exam End: 06/06/24 11:24    Specimen Collected: 06/06/24 07:33 Last Resulted: 06/06/24 17:02   Received From: [facility]  Result Received: 07/29/24 09:09      Assessment & Plan       Neurology-alert awake oriented no active issues going on    Respiratory-acute on chronic hypoxic respiratory failure-likely due to flareup of her interstitial lung disease.  Currently having diffuse alveolar  hemorrhage.  Continue diuretics to improve lung compliance and diffusion capacity.  Will replace potassium.  Received steroids in the ER.  Increase the dose of steroids to 1 g IV Solu-Medrol daily for next 3 days then will slowly taper over next 2 to 3 weeks.    High flow settings reviewed and discussed with patient.  Remains on 60 L of flow and 63% of FiO2  Give diuretics to improve lung compliance and diffusion capacity.    Chest x-ray-latest reviewed   will repeat chest x-ray tomorrow morning    ABG-latest reviewed    If respiratory status gets worse can start on BiPAP with settings 20 x 10  Respiratory rate 20  Adjust FiO2 to maintain saturation 92 to 95%  If blood pressure is good-  Can also consider giving a dose of diuretics.    Cardiology- hemodynamically -stable.  Continue to monitor HR- rate and rhythm, BP     Nephrology- Cr and BUN stable  I/O-reviewed    GI-if improves can start on a diet    Hematology- CBC  Hb  platelet  WBC    ID  Culture-reviewed    Procalitonin Results:      Lab 02/26/25  0901   PROCALCITONIN 0.19        Endrocrinology- Maintain Blood sugar 140 -180      Electrolytes-   Mag and phos       DVT prophylaxis-  Continue      Case discussed with patient's  at bedside.        Echo-  Results for orders placed during the hospital encounter of 09/29/23    Adult Transthoracic Echo Complete w/ Color, Spectral and Contrast if necessary per protocol    Interpretation Summary    Normal left ventricular cavity size and wall thickness noted. All left ventricular wall segments contract normally.    Left ventricular ejection fraction appears to be 61 - 65%.    The aortic valve is structurally normal with no stenosis present. Trace aortic valve regurgitation is present.    The mitral valve is structurally normal with no significant stenosis present. Mild mitral valve regurgitation is present.    . There is no evidence of pericardial effusion. .  Currently patient is critically ill due to hypoxic  respiratory failure requiring very high settings of high flow nasal cannula oxygen flareup of the ILD and diffuse alveolar hemorrhage.  Patient is very high risk for respiratory failure will monitor closely and might need intubation.          Acute hypoxic respiratory failure          Maikel Trevino MD  02/27/25  13:31 EST

## 2025-02-27 NOTE — PLAN OF CARE
Goal Outcome Evaluation:  Plan of Care Reviewed With: patient        Progress: no change  Outcome Evaluation: Pt is A/Ox4. VSS. HHF. Pt has no requests at this time. Bed in lowest position, call light within reach, and bed alarm on.         Problem: Adult Inpatient Plan of Care  Goal: Plan of Care Review  Outcome: Progressing  Flowsheets (Taken 2/27/2025 0440)  Progress: no change  Outcome Evaluation: Pt is A/Ox4. VSS. HHF. Pt has no requests at this time. Bed in lowest position, call light within reach, and bed alarm on.  Plan of Care Reviewed With: patient  Goal: Patient-Specific Goal (Individualized)  Outcome: Progressing  Goal: Absence of Hospital-Acquired Illness or Injury  Outcome: Progressing  Intervention: Identify and Manage Fall Risk  Recent Flowsheet Documentation  Taken 2/27/2025 0300 by Trena Laughlin RN  Safety Promotion/Fall Prevention:   activity supervised   assistive device/personal items within reach   clutter free environment maintained   room organization consistent   safety round/check completed  Taken 2/27/2025 0100 by Trena Laughlin RN  Safety Promotion/Fall Prevention:   activity supervised   assistive device/personal items within reach   clutter free environment maintained   room organization consistent   safety round/check completed  Taken 2/26/2025 2300 by Trena Laughlin RN  Safety Promotion/Fall Prevention:   activity supervised   assistive device/personal items within reach   clutter free environment maintained   room organization consistent   safety round/check completed  Taken 2/26/2025 2100 by Trena Laughlin RN  Safety Promotion/Fall Prevention:   activity supervised   assistive device/personal items within reach   clutter free environment maintained   room organization consistent   safety round/check completed  Taken 2/26/2025 1900 by Trena Laughlin RN  Safety Promotion/Fall Prevention:   activity supervised   assistive device/personal items within reach   clutter free  environment maintained   room organization consistent   safety round/check completed  Intervention: Prevent Skin Injury  Recent Flowsheet Documentation  Taken 2/27/2025 0200 by Trena Laughlin RN  Skin Protection:   incontinence pads utilized   transparent dressing maintained  Taken 2/26/2025 2000 by Trena Laughlin RN  Skin Protection:   incontinence pads utilized   transparent dressing maintained  Intervention: Prevent and Manage VTE (Venous Thromboembolism) Risk  Recent Flowsheet Documentation  Taken 2/27/2025 0200 by Trena Laughlin RN  VTE Prevention/Management:   bilateral   SCDs (sequential compression devices) on  Taken 2/26/2025 2000 by Trena Laughlin RN  VTE Prevention/Management:   bilateral   SCDs (sequential compression devices) on  Intervention: Prevent Infection  Recent Flowsheet Documentation  Taken 2/27/2025 0300 by Trena Laughlin RN  Infection Prevention:   rest/sleep promoted   single patient room provided  Taken 2/27/2025 0100 by Trena Laughlin RN  Infection Prevention:   rest/sleep promoted   single patient room provided  Taken 2/26/2025 2300 by Trena Laughlin RN  Infection Prevention:   rest/sleep promoted   single patient room provided  Taken 2/26/2025 2100 by Trena Laughlin RN  Infection Prevention:   rest/sleep promoted   single patient room provided  Taken 2/26/2025 1900 by Trena Laughlin RN  Infection Prevention:   rest/sleep promoted   single patient room provided  Goal: Optimal Comfort and Wellbeing  Outcome: Progressing  Intervention: Provide Person-Centered Care  Recent Flowsheet Documentation  Taken 2/27/2025 0200 by Trena Laughlin RN  Trust Relationship/Rapport:   care explained   questions answered   thoughts/feelings acknowledged   empathic listening provided   questions encouraged   reassurance provided  Taken 2/26/2025 2000 by Trena Laughlin RN  Trust Relationship/Rapport:   questions encouraged   thoughts/feelings acknowledged   reassurance provided   questions  answered   empathic listening provided   emotional support provided   choices provided   care explained  Goal: Readiness for Transition of Care  Outcome: Progressing     Problem: Fall Injury Risk  Goal: Absence of Fall and Fall-Related Injury  Outcome: Progressing  Intervention: Identify and Manage Contributors  Recent Flowsheet Documentation  Taken 2/27/2025 0300 by Trena Laughlin RN  Medication Review/Management: medications reviewed  Taken 2/27/2025 0100 by Trena Laughlin RN  Medication Review/Management: medications reviewed  Taken 2/26/2025 2300 by Trena Laughlin RN  Medication Review/Management: medications reviewed  Taken 2/26/2025 2100 by Trena Laughlin RN  Medication Review/Management: medications reviewed  Taken 2/26/2025 1900 by Trena Laughlin RN  Medication Review/Management: medications reviewed  Intervention: Promote Injury-Free Environment  Recent Flowsheet Documentation  Taken 2/27/2025 0300 by Trena Laughlin RN  Safety Promotion/Fall Prevention:   activity supervised   assistive device/personal items within reach   clutter free environment maintained   room organization consistent   safety round/check completed  Taken 2/27/2025 0100 by Trena Laughlin RN  Safety Promotion/Fall Prevention:   activity supervised   assistive device/personal items within reach   clutter free environment maintained   room organization consistent   safety round/check completed  Taken 2/26/2025 2300 by Trena Laughlin RN  Safety Promotion/Fall Prevention:   activity supervised   assistive device/personal items within reach   clutter free environment maintained   room organization consistent   safety round/check completed  Taken 2/26/2025 2100 by Trena Laughlin RN  Safety Promotion/Fall Prevention:   activity supervised   assistive device/personal items within reach   clutter free environment maintained   room organization consistent   safety round/check completed  Taken 2/26/2025 1900 by Trena Laughlin RN  Safety  Promotion/Fall Prevention:   activity supervised   assistive device/personal items within reach   clutter free environment maintained   room organization consistent   safety round/check completed     Problem: Breathing Pattern Ineffective  Goal: Effective Breathing Pattern  Outcome: Progressing     Problem: Skin Injury Risk Increased  Goal: Skin Health and Integrity  Outcome: Progressing  Intervention: Optimize Skin Protection  Recent Flowsheet Documentation  Taken 2/27/2025 0200 by Trena Laughlin RN  Pressure Reduction Techniques:   frequent weight shift encouraged   weight shift assistance provided  Pressure Reduction Devices:   positioning supports utilized   pressure-redistributing mattress utilized  Skin Protection:   incontinence pads utilized   transparent dressing maintained  Taken 2/26/2025 2000 by Trena Laughlin RN  Pressure Reduction Techniques:   frequent weight shift encouraged   weight shift assistance provided  Pressure Reduction Devices:   positioning supports utilized   pressure-redistributing mattress utilized  Skin Protection:   incontinence pads utilized   transparent dressing maintained     Problem: Sepsis/Septic Shock  Goal: Optimal Coping  Outcome: Progressing  Intervention: Support Patient and Family Response  Recent Flowsheet Documentation  Taken 2/27/2025 0200 by Trena Laughlin RN  Family/Support System Care: support provided  Taken 2/26/2025 2000 by Trena Laughlin RN  Family/Support System Care: support provided  Goal: Absence of Bleeding  Outcome: Progressing  Goal: Blood Glucose Level Within Target Range  Outcome: Progressing  Intervention: Optimize Glycemic Control  Recent Flowsheet Documentation  Taken 2/26/2025 2000 by Trena Laughlin RN  Hyperglycemia Management: blood glucose monitored  Hypoglycemia Management: blood glucose monitored  Goal: Absence of Infection Signs and Symptoms  Outcome: Progressing  Intervention: Initiate Sepsis Management  Recent Flowsheet  Documentation  Taken 2/27/2025 0300 by Trena Laughlin, GUY  Infection Prevention:   rest/sleep promoted   single patient room provided  Taken 2/27/2025 0100 by Trena Laughlin RN  Infection Prevention:   rest/sleep promoted   single patient room provided  Taken 2/26/2025 2300 by Trena Laughlin RN  Infection Prevention:   rest/sleep promoted   single patient room provided  Taken 2/26/2025 2100 by Trena Laughlin RN  Infection Prevention:   rest/sleep promoted   single patient room provided  Taken 2/26/2025 1900 by Trena Laughlin RN  Infection Prevention:   rest/sleep promoted   single patient room provided  Goal: Optimal Nutrition Delivery  Outcome: Progressing

## 2025-02-27 NOTE — CONSULTS
Assessment:  Diagnosis: acute hypoxic respiratory failure    Allergies   Allergen Reactions    Ampicillin Diarrhea    Clonidine Other (See Comments)     Patches cause chemical burns       Order Date/Time: 2/27/2025 1123  Indications: difficult access; multi-meds  LABS:  Lab Results   Component Value Date    INR 1.19 (H) 02/26/2025    PROTIME 15.2 (H) 02/26/2025     Lab Results   Component Value Date    PTT 23.7 12/26/2024     Lab Results   Component Value Date    WBC 17.35 (H) 02/27/2025    HGB 12.6 02/27/2025    HCT 38.7 02/27/2025    MCV 89.6 02/27/2025     02/27/2025     Lab Results   Component Value Date    BUN 13 02/27/2025     Lab Results   Component Value Date    CREATININE 0.84 02/27/2025     Lab Results   Component Value Date    EGFRIFNONA 66 02/12/2021     Labs Reviewed: all labs reviewed    Contraindications for PICC/Midline:  No contraindications noted    US PIV requested per consult    Recommendations:  AccuCath Ace Intravascular Catheter; 18 g; 2.25 in   Lower Right Forearm    Introcan Safety IV Catheter 20 g; 1.75 in  Lower Right Forearm    Procedure Time Out:  Time out Time: 1635  Correct Patient Identity: Yes  Correct Surgical Side and Site Are Marked: Yes  Agreement on Procedure to be done: Yes  Antibiotic Given: N/A  RN: FREDERICK He RN    AccuCath Ace Intravascular Catheter placed with ultrasound guidance and verified by blood return. Minimal blood loss noted. Catheter flushes easily. Blood return noted.     Introcan Safety IV Catheter placed with ultrasound guidance and verified by blood return. Minimal blood loss noted. Catheter flushes easily. Blood return noted.     Patient nurse, Gabbi TOVAR, made aware that both US PIV catheters are in the R forearm and ready for use.    Shelby He RN

## 2025-02-27 NOTE — THERAPY EVALUATION
"Acute Care - Speech Language Pathology   Swallow Initial Evaluation [facility]  CLINICAL DYSPHAGIA ASSESSMENT     Patient Name: Gregoria Pendleton  : 1945  MRN: [id number]  Today's Date: 2025     Admit Date: 2025  Gregoria Pendleton  was seen at bedside this am on PCU-[id number] to assess safety/efficacy of swallowing fnx, determine safest/least restrictive diet tolerance. She has a PMH significant for  pulmonary fibrosis, Hx CVA, GERD, dysphagia, thyroid disease, HTN, HLD, hx CVA approximately 2 years ago, and dysphagia w/ esophageal dilation required.     Per EMR review, \"She and spouse report she has been increasingly more short of breath for about the past week. She is having increased cough but denies much sputum production. She does wear 2L supplemental O2 at baseline. She has not had any fevers, chills. No known sick contacts. She does complain of rhinorrhea. No abdominal pain. She complains of constipation.   She has history of dysphagia- needs to have esophagus stretched periodically. For the past few weeks she has had more difficulty swallowing. Does not eat a modified diet at baseline, has occasional difficulty swallowing pills. Last night while eating dinner she \"strangled\" but after clearing her O2 sats normalized at home. She went on to bed and upon waking this morning they found her O2 saturation to be 33% on home pulse ox. Her  noted her to have some hemoptysis this morning as well and she did have one episode of vomiting. She denies recent epistaxis\".     Upon arrival to the ED, vital signs were temp 97.4, heart rate 107, respirations 35, SpO2 44% on nonrebreather, /78.  ABG obtained on 100% FiO2 nonrebreather showed pH normal, pCO2 normal, pO2 77.1.  Her initial HS troponin was 28 with repeat at 33, proBNP is 3228.  White blood cell count is elevated at 21.42 with D-dimer 1.73, lactate 3.4 and Pro-Brooks normal.  CT PE protocol negative for PE-did note diffuse bilateral airspace " pneumonia-dense consolidation concerning for alveolar hemorrhage.     She is unfamiliar to SLP department of [facility] prior to this consult. No SLP notes are available for review from outside facilities at this time per EMR review. She is currently NPO w/ sips permitted w/ meds.     Social History     Socioeconomic History    Marital status:    Tobacco Use    Smoking status: Never     Passive exposure: Never    Smokeless tobacco: Never   Vaping Use    Vaping status: Never Used   Substance and Sexual Activity    Alcohol use: Never    Drug use: Never    Sexual activity: Defer      Imaging:  Radiology Results (last 21 days)  Procedure Component Value Units Date/Time   CT Abdomen Pelvis Without Contrast [[id number]] Review Not Required   Order Status: Sent Resulted: 02/26/25 1948    Updated: 02/26/25 2001    CT Angiogram Chest Pulmonary Embolism [[id number]] Josh as Reviewed   Order Status: Completed Collected: 02/26/25 1007    Updated: 02/26/25 1010   Narrative:     EXAM:    CT Angiography Chest With Intravenous Contrast     EXAM DATE:    2/26/2025 10:07 AM     CLINICAL HISTORY:    elevated d dimer     TECHNIQUE:    Axial computed tomographic angiography images of the chest with  intravenous contrast.  This CT exam was performed using one or more of  the following dose reduction techniques:  automated exposure control,  adjustment of the mA and/or kV according to patient size, and/or use of  iterative reconstruction technique.    MIP reconstructed images were created and reviewed.     COMPARISON:    No relevant prior studies available.     FINDINGS:    Pulmonary arteries:  Unremarkable as visualized.  No pulmonary  embolism.    Aorta:  No acute findings.  No thoracic aortic aneurysm.    Lungs and pleural spaces:  Diffuse bilateral airspace pneumonia and  given dense consolidation consider alveolar hemorrhage.  No significant  effusion.    Heart:  Unremarkable as visualized.  No cardiomegaly.  No  significant  pericardial effusion.  No evidence of RV dysfunction.    Bones/joints:  No acute fracture.  No dislocation.    Soft tissues:  Unremarkable as visualized.    Lymph nodes:  Unremarkable as visualized.  No enlarged lymph nodes.      Impression:     1.  Diffuse bilateral airspace pneumonia and given dense consolidation  consider alveolar hemorrhage.  2.  No pulmonary embolism.     This report was finalized on 2/26/2025 10:08 AM by Dr. Theodore Bautista MD.       XR Chest AP [[id number]] Josh as Reviewed   Order Status: Completed Collected: 02/26/25 0942    Updated: 02/26/25 0944   Narrative:     EXAM:    XR Chest, 1 View     EXAM DATE:    2/26/2025 9:42 AM     CLINICAL HISTORY:    hypoxemia     TECHNIQUE:    Frontal view of the chest.     COMPARISON:    2/1/2025     FINDINGS:    Lungs and pleural spaces:  Patchy bilateral airspace disease.  No  consolidation.  No pneumothorax.    Heart:  Unremarkable as visualized.  No cardiomegaly.    Mediastinum:  Unremarkable as visualized.  Normal mediastinal contour.    Bones/joints:  Unremarkable as visualized.  No acute fracture.      Impression:       Patchy bilateral airspace disease.     This report was finalized on 2/26/2025 9:42 AM by Dr. Theodore Bautista MD.        Labs:   Latest Reference Range & Units 02/26/25 09:01 02/27/25 00:58   WBC 3.40 - 10.80 10*3/mm3 21.42 (H) 17.35 (H)   RBC 3.77 - 5.28 10*6/mm3 4.49 4.32   Hemoglobin 12.0 - 15.9 g/dL 13.3 12.6   Hematocrit 34.0 - 46.6 % 40.9 38.7   Platelets 140 - 450 10*3/mm3 346 328   RDW 12.3 - 15.4 % 14.6 14.6   MCV 79.0 - 97.0 fL 91.1 89.6   MCH 26.6 - 33.0 pg 29.6 29.2   MCHC 31.5 - 35.7 g/dL 32.5 32.6   MPV 6.0 - 12.0 fL 10.1 9.8   RDW-SD 37.0 - 54.0 fl 48.8 47.5   (H): Data is abnormally high  Diet Orders (active) (From admission, onward)       Start     Ordered    02/27/25 1058  NPO Diet NPO Type: Ice Chips, Other (see comments)  Diet Effective Now        Comments: Ice chips and/or sips thin water provided per pt  request.   Medications via non-oral route.    02/27/25 1057                  Prior to SLP entry, RN reports frequent coughing with any attempts at po medications.     Pt and family member report pt having difficulty swallowing for several years, however this increased following CVA two years ago. She and spouse deny SLP evaluation prior to this.     Ms Pendleton was observed on HHFNC at 60 L/min at 60% w/o complications across this assessment.     She was positioned upright and centered in bed to accept multiple po presentations of ice chips and thin liquids via spoon, cup, and straw.  She required assistance to self provide po trials.     Facial/oral structures were symmetrical upon observation. Lingual protrusion revealed no deviation. Oral mucosa were moist, pink, and clean. Secretions were clear, thin, and well controlled. OROM/RICARDO was generally weak overall to imitate oral postures. Gag is not assessed. Volitional cough was intact w/ mildly weak intensity, congestive in quality, non-productive. Voice was adequate in intensity, clear in quality w/ intelligible speech.    Upon po presentations, adequate bolus anticipation and acceptance w/ good labial seal for bolus clearance via spoon bowl, cup rim stability and suction via straw. Bolus formation, manipulation and control were mildly prolonged w/ primarily rotary mastication pattern. A-p transit was delayed w/o significant oral residue appreciated. No overt s/s aspiration before the swallow.      Pharyngeal swallow was delayed in initiation w/ weak hyolaryngeal elevation per palpation. Overt s/s aspiration were demonstrated intermittently w/ thin liquids via straw presentation. Silent aspiration is unable to be definitively ruled out via this subjective assessment.    Visit Dx:     ICD-10-CM ICD-9-CM   1. Pulmonary fibrosis  J84.10 515   2. Aspiration pneumonia of both lungs, unspecified aspiration pneumonia type, unspecified part of lung  J69.0 507.0     Patient  Active Problem List   Diagnosis    Pulmonary fibrosis    Essential hypertension    Cryptogenic stroke    Esophageal dysphagia    Gastroesophageal reflux disease    Acute hypoxic respiratory failure     Past Medical History:   Diagnosis Date    Arthritis     Disease of thyroid gland     Elevated cholesterol     GERD (gastroesophageal reflux disease)     Hyperlipidemia     Hypertension     Migraine     Pulmonary fibrosis     Stroke      Past Surgical History:   Procedure Laterality Date    CYSTOSCOPY W/ LASER LITHOTRIPSY  01/15/2024    ENDOSCOPY N/A 12/12/2024    Procedure: ESOPHAGOGASTRODUODENOSCOPY WITH BIOPSY;  Surgeon: Jaky Goddard MD;  Location: [facility];  Service: Gastroenterology;  Laterality: N/A;  dilated to 20mm    HYSTERECTOMY  age 38    TUBAL ABDOMINAL LIGATION Bilateral      Impression:     Ms Pendleton presented w/ a generally weak overall oropharyngeal swallow w/ overt s/s concerning for aspiration demonstrated intermittently w/ thin water. Silent aspiration is unable to be definitively ruled out via this subjective assessment.     Given findings from this assessment, leukocytosis, hx of dysphagia, hx CVA, recent choking event, and current chest imaging, she is felt to most benefit from instrumental MBS to r/o aspiration prior to po diet recommendations.     Current [facility] requirements, prevent pt from being able to participate in MBS at this time. Per this, she is recommended to continue NPO w/ ice chips and/or sips thin water provided upon pt request w/ medications administered via non-oral route.     SLP Recommendation and Plan     1. NPO.   2. Ice chips and/or sips thin water provided per pt request.    3. Medicatoins via non-oral route.    4. Upright and centered for all po intake  5. JACK precautions.  6. Oral care protocol.    SLP to f/u for improvement in pt status to allow participation in instrumental dysphagia evaluation.     D/w patient results and recommendations w/ verbal  agreement.    D/w RN results and recommendations w/ verbal agreement.    Thank you for allowing me to participate in the care of your patient-  Danni Fischer M.S., CCC-SLP        EDUCATION  The patient has been educated in the following areas:   Dysphagia (Swallowing Impairment) Oral Care/Hydration NPO rationale.        Time Calculation:       Danni Fischer MS CCC-SLP  2/27/2025

## 2025-02-28 NOTE — PROGRESS NOTES
[facility] HOSPITALIST PROGRESS NOTE    Subjective     History:   Gregoria Pendleton is a 79 y.o. female admitted on 2/26/2025 secondary to Acute hypoxic respiratory failure     Procedures: None    CC: Follow up resp failure     Patient seen and examined with GUY Seo. Awake and alert. Reports HA. States she slept better last PM. Continues to report dyspnea but appears slightly improved. No further episodes of hemoptysis reported. No further episodes of dysuria reported. No reported vomiting. Remains on heated HFNC. No acute events reported.     History taken from: patient, chart, and RN.      Objective     Vital Signs  Temp:  [97.5 °F (36.4 °C)-98.2 °F (36.8 °C)] 97.5 °F (36.4 °C)  Heart Rate:  [54-71] 62  Resp:  [16-35] 34  BP: ()/(40-82) 164/69    Intake/Output Summary (Last 24 hours) at 2/28/2025 1416  Last data filed at 2/28/2025 0400  Gross per 24 hour   Intake 717.36 ml   Output 550 ml   Net 167.36 ml         Physical Exam:  General:    Awake, alert, in no acute distress, ill appearing   Heart:      Normal S1 and S2. Regular rate and rhythm. No significant murmur, rubs or gallops appreciated.   Lungs:     Respirations regular, even and unlabored. Diminished breath sounds throughout.    Abdomen:   Soft and nontender. No guarding, rebound tenderness or  organomegaly noted. Bowel sounds present x 4.   Extremities:  No clubbing, cyanosis or edema noted. Moves UE and LE equally B/L.     Results Review:    Results from last 7 days   Lab Units 02/28/25  0128 02/27/25  0058 02/26/25  0901   WBC 10*3/mm3 16.37* 17.35* 21.42*   HEMOGLOBIN g/dL 11.0* 12.6 13.3   PLATELETS 10*3/mm3 320 328 346     Results from last 7 days   Lab Units 02/28/25  0128 02/27/25  0058 02/26/25  0901   SODIUM mmol/L 144 141 141   POTASSIUM mmol/L 3.3* 3.3* 4.2   CHLORIDE mmol/L 105 102 102   CO2 mmol/L 25.3 23.2 26.5   BUN mg/dL 40* 13 11   CREATININE mg/dL 1.26* 0.84 0.79   CALCIUM mg/dL 9.2 9.1 9.3   GLUCOSE mg/dL 169*  182* 184*     Results from last 7 days   Lab Units 02/27/25  0058 02/26/25  0901   BILIRUBIN mg/dL 0.5 0.5   ALK PHOS U/L 110 120*   AST (SGOT) U/L 38* 54*   ALT (SGPT) U/L 21 23     Results from last 7 days   Lab Units 02/28/25  0128 02/27/25  0058   MAGNESIUM mg/dL 2.3 2.1     Results from last 7 days   Lab Units 02/26/25  1843   INR  1.19*     Results from last 7 days   Lab Units 02/26/25  0956 02/26/25  0901   HSTROP T ng/L 33* 28*       Imaging Results (Last 24 Hours)       ** No results found for the last 24 hours. **              Medications:  amLODIPine, 5 mg, Oral, Q24H   And  [Held by provider] lisinopril, 40 mg, Oral, Q24H  aspirin, 81 mg, Oral, Daily  atenolol, 25 mg, Oral, Daily  atorvastatin, 10 mg, Oral, Daily  azithromycin, 500 mg, Intravenous, Q24H  cefTRIAXone, 2,000 mg, Intravenous, Q24H  cetirizine, 10 mg, Oral, Daily  cholecalciferol, 50,000 Units, Oral, Weekly  cloNIDine, 0.2 mg, Oral, Q8H  donepezil, 10 mg, Oral, Nightly  DULoxetine, 60 mg, Oral, Daily  estradiol, 1 mg, Oral, Daily  FLUoxetine, 10 mg, Oral, Daily  insulin lispro, 2-7 Units, Subcutaneous, 4x Daily AC & at Bedtime  isosorbide mononitrate, 30 mg, Oral, Daily  levothyroxine, 100 mcg, Oral, QAM AC  lubiprostone, 8 mcg, Oral, BID  melatonin, 10 mg, Oral, Nightly  [START ON 3/1/2025] methylPREDNISolone sodium succinate, 500 mg, Intravenous, Daily  nystatin, 1 Application, Topical, BID  pantoprazole, 40 mg, Oral, Q AM  potassium chloride, 40 mEq, Oral, Once  rOPINIRole, 0.5 mg, Oral, Daily  sodium chloride, 10 mL, Intravenous, Q12H  sodium chloride, 10 mL, Intravenous, Q12H  sodium chloride, 10 mL, Intravenous, Q12H      sodium chloride, 100 mL/hr, Last Rate: 100 mL/hr (02/28/25 1357)            Assessment & Plan   Acute on chronic hypoxic respiratory failure: Likely 2/2 exacerbation of pulmonary fibrosis with concern for DAH. Concern for possible pneumonia on CT with no evidence of PE. Cont high dose steroids. Intermittent diuresis  as tolerated to assist with lung compliance. Currently on azithromycin and Rocephin empirically. Currently on heated HFNC. Cont to monitor closely. Pulm input appreciated.     Essential HTN: BP has been elevated with home medications resumed on admission with PRN regimen added as well. Episode of hypotension on 2/27 but improved. Lisinopril and spironolactone held as Cr has risen today. Cont to monitor.     Hyperglycemia: Steroids likely contributing. HgbA1c 6.1. Cont SSI with Accuchecks.     OMDI: Cr has risen today. Hold additional diuresis. Home lisinopril and spironolactone held. IVF's started per pulm. Monitor UOP and repeat labs in the AM.     Mild hypokalemia: Replace. Mg is >2. Cont to monitor.     Concern for dysphagia: NPO with sips with meds pending additional SLP eval. Unable to go for MBS at present 2/2 O2 requirements. If remains NPO, will plan to place NG to start TF's.     HLD: Cont statin.     Hypothyroidism: Cont levothyroxine.     GERD: Cont PPI.     DVT PPX: SCD's     Discussed with Dr. Trevino.     Disposition Pending clinical course. May require several days of hospitalization in the setting of increased O2 requirements.     Lito Branch, DO  02/28/25  14:16 EST

## 2025-02-28 NOTE — THERAPY EVALUATION
Acute Care - Physical Therapy Initial Evaluation   [facility]     Patient Name: Gregoria Pendleton  : 1945  MRN: [id number]  Today's Date: 2025   Onset of Illness/Injury or Date of Surgery: 25  Visit Dx:     ICD-10-CM ICD-9-CM   1. Pulmonary fibrosis  J84.10 515   2. Aspiration pneumonia of both lungs, unspecified aspiration pneumonia type, unspecified part of lung  J69.0 507.0     Patient Active Problem List   Diagnosis    Pulmonary fibrosis    Essential hypertension    Cryptogenic stroke    Esophageal dysphagia    Gastroesophageal reflux disease    Acute hypoxic respiratory failure     Past Medical History:   Diagnosis Date    Arthritis     Disease of thyroid gland     Elevated cholesterol     GERD (gastroesophageal reflux disease)     Hyperlipidemia     Hypertension     Migraine     Pulmonary fibrosis     Stroke      Past Surgical History:   Procedure Laterality Date    CYSTOSCOPY W/ LASER LITHOTRIPSY  01/15/2024    ENDOSCOPY N/A 2024    Procedure: ESOPHAGOGASTRODUODENOSCOPY WITH BIOPSY;  Surgeon: Jaky Goddard MD;  Location: [facility];  Service: Gastroenterology;  Laterality: N/A;  dilated to 20mm    HYSTERECTOMY  age 38    TUBAL ABDOMINAL LIGATION Bilateral      PT Assessment (Last 12 Hours)       PT Evaluation and Treatment       Row Name 25 1125          Physical Therapy Time and Intention    Subjective Information complains of;weakness;fatigue;dyspnea  -CT     Document Type evaluation  -CT     Mode of Treatment individual therapy;physical therapy  -CT     Patient Effort good  -CT     Symptoms Noted During/After Treatment fatigue  -CT     Comment Pt reports she lives at home with ex- and is independent PLOF. Pt is Min/Mod A bed mobility at time of eval. No out of bed attempted d/t oxygen requirements.  -CT       Row Name 25 1125          General Information    Patient Profile Reviewed yes  -CT     Onset of Illness/Injury or Date of Surgery 25  -CT      Referring Physician Sarina  -CT     Patient Observations alert;cooperative;agree to therapy  -CT     Prior Level of Function independent:  -CT     Equipment Currently Used at Home cane, straight  -CT     Existing Precautions/Restrictions fall;oxygen therapy device and L/min  -CT     Equipment Issued to Patient gait belt  -CT     Risks Reviewed patient:;LOB;nausea/vomiting;dizziness;increased discomfort;change in vital signs;increased drainage;lines disloged  -CT     Benefits Reviewed patient:;improve function;increase independence;increase strength;increase balance;decrease pain;decrease risk of DVT;improve skin integrity;increase knowledge  -CT     Barriers to Rehab medically complex  -CT       Row Name 02/28/25 1125          Living Environment    Current Living Arrangements home  -CT     People in Home significant other  -CT       Row Name 02/28/25 1125          Home Use of Assistive/Adaptive Equipment    Equipment Currently Used at Home oxygen;rollator;commode;wheelchair;cane, straight  -CT       Row Name 02/28/25 1125          Cognition    Affect/Mental Status (Cognition) WFL  -CT     Orientation Status (Cognition) oriented x 4  -CT     Follows Commands (Cognition) WFL  -CT       Row Name 02/28/25 1125          Range of Motion Comprehensive    Comment, General Range of Motion BLE grossly WFL  -CT       Row Name 02/28/25 1125          Strength Comprehensive (MMT)    Comment, General Manual Muscle Testing (MMT) Assessment BLE grossly 3/5 ( no resistance given )  -CT       Row Name 02/28/25 1125          Bed Mobility    Bed Mobility bed mobility (all) activities;supine-sit;sit-supine  -CT     Supine-Sit [place] (Bed Mobility) minimum assist (75% patient effort)  -CT     Sit-Supine [place] (Bed Mobility) moderate assist (50% patient effort)  -CT     Bed Mobility, Safety Issues decreased use of arms for pushing/pulling;decreased use of legs for bridging/pushing  -CT       Row Name 02/28/25 1125           Transfers    Comment, (Transfers) no out of bed attempted this date  -CT       Row Name 02/28/25 1125          Balance    Balance Assessment sitting static balance  -CT     Static Sitting Balance standby assist  -CT     Position, Sitting Balance sitting edge of bed  -CT       Row Name 02/28/25 1125          Coping    Observed Emotional State cooperative  -CT       Row Name 02/28/25 1125          Plan of Care Review    Plan of Care Reviewed With patient;spouse  -CT       Row Name 02/28/25 1125          Positioning and Restraints    Pre-Treatment Position in bed  -CT     Post Treatment Position bed  -CT     In Bed fowlers;call light within reach;encouraged to call for assist;exit alarm on;side rails up x3  -CT       Row Name 02/28/25 1125          Therapy Assessment/Plan (PT)    Patient/Family Therapy Goals Statement (PT) Pt goals are to return to PLOF  -CT     Functional Level at Time of Evaluation (PT) Min/Mod A bed mobility  -CT     PT Diagnosis (PT) decreased functional mobility  -CT     Rehab Potential (PT) good  -CT     Criteria for Skilled Interventions Met (PT) yes;skilled treatment is necessary  -CT     Therapy Frequency (PT) 2 times/wk  1-5 times/wk  -CT     Predicted Duration of Therapy Intervention (PT) length of stay  -CT       Row Name 02/28/25 1125          Therapy Plan Review/Discharge Plan (PT)    Therapy Plan Review (PT) evaluation/treatment results reviewed;care plan/treatment goals reviewed;risks/benefits reviewed;current/potential barriers reviewed;participants voiced agreement with care plan;participants included;patient  -CT       Row Name 02/28/25 1125          Physical Therapy Goals    Bed Mobility Goal Selection (PT) bed mobility, PT goal 1  -CT     Transfer Goal Selection (PT) transfer, PT goal 1  -CT       Row Name 02/28/25 1125          Bed Mobility Goal 1 (PT)    Activity/Assistive Device (Bed Mobility Goal 1, PT) bed mobility activities, all  -CT     [place] Level/Cues Needed (Bed  Mobility Goal 1, PT) standby assist  -CT     Time Frame (Bed Mobility Goal 1, PT) by discharge  -CT       Row Name 02/28/25 1125          Transfer Goal 1 (PT)    Activity/Assistive Device (Transfer Goal 1, PT) sit-to-stand/stand-to-sit;bed-to-chair/chair-to-bed  -CT     [place] Level/Cues Needed (Transfer Goal 1, PT) minimum assist (75% or more patient effort);moderate assist (50-74% patient effort)  -CT     Time Frame (Transfer Goal 1, PT) by discharge  -CT               User Key  (r) = Recorded By, (t) = Taken By, (c) = Cosigned By      Initials Name Provider Type    CT Alona Kellogg, PT Physical Therapist                    Physical Therapy Education        No education to display                  PT Recommendation and Plan  Anticipated Discharge Disposition (PT):  (tbd based on pt progress)  Planned Therapy Interventions (PT): balance training, bed mobility training, gait training, home exercise program, manual therapy techniques, motor coordination training, neuromuscular re-education, patient/family education, postural re-education, strengthening, transfer training  Therapy Frequency (PT): 2 times/wk (1-5 times/wk)  Plan of Care Reviewed With: patient, spouse       Time Calculation:    PT Charges       Row Name 02/28/25 1342             Time Calculation    PT Received On 02/28/25  -CT      PT Goal Re-Cert Due Date 03/14/25  -CT                User Key  (r) = Recorded By, (t) = Taken By, (c) = Cosigned By      Initials Name Provider Type    CT Alona Kellogg, MALCOLM Physical Therapist                  Therapy Charges for Today       Code Description Service Date Service Provider Modifiers Qty    93237241257 HC PT EVAL MOD COMPLEXITY 4 2/28/2025 Alona Kellogg, PT GP 1            PT G-Codes  AM-PAC 6 Clicks Score (PT): 14    Alona Kellogg PT  2/28/2025

## 2025-02-28 NOTE — PROGRESS NOTES
Referring Provider: Dr. Avila  Reason for Consultation: Abnormal CT chest, ILD and DAH      Chief complaint -shortness of breath and coughing up blood      Sub-overnight events reviewed  All the labs medications ins and outs and vitals reviewed.  Patient resting in bed comfortably.  FiO2 requirements remain stable.  Not coughing up any blood.    Review of Systems   Still coughing up but is clear phlegm now..    Otherwise negative    History  Past Medical History:   Diagnosis Date    Arthritis     Disease of thyroid gland     Elevated cholesterol     GERD (gastroesophageal reflux disease)     Hyperlipidemia     Hypertension     Migraine     Pulmonary fibrosis     Stroke    ,   Past Surgical History:   Procedure Laterality Date    CYSTOSCOPY W/ LASER LITHOTRIPSY  01/15/2024    ENDOSCOPY N/A 12/12/2024    Procedure: ESOPHAGOGASTRODUODENOSCOPY WITH BIOPSY;  Surgeon: Jaky Goddard MD;  Location: [facility] OR;  Service: Gastroenterology;  Laterality: N/A;  dilated to 20mm    HYSTERECTOMY  age 38    TUBAL ABDOMINAL LIGATION Bilateral    ,   Family History   Problem Relation Age of Onset    Heart failure Father     Heart disease Sister     Heart attack Sister     Breast cancer Neg Hx    ,   Social History     Tobacco Use    Smoking status: Never     Passive exposure: Never    Smokeless tobacco: Never   Vaping Use    Vaping status: Never Used   Substance Use Topics    Alcohol use: Never    Drug use: Never   ,   Medications Prior to Admission   Medication Sig Dispense Refill Last Dose/Taking    amLODIPine-benazepril (LOTREL) 5-40 MG per capsule Take 1 capsule by mouth Daily.   2/25/2025    aspirin 81 MG EC tablet Take 1 tablet by mouth Daily.   2/25/2025 Morning    atenolol (TENORMIN) 25 MG tablet Take 1 tablet by mouth Daily.   2/25/2025    cetirizine (zyrTEC) 10 MG tablet Take 1 tablet by mouth Daily.   2/25/2025    cloNIDine (CATAPRES) 0.2 MG tablet Take 1 tablet by mouth 3 times a day.   2/25/2025     donepezil (ARICEPT) 10 MG tablet Take 1 tablet by mouth Every Night.   2/25/2025    DULoxetine (CYMBALTA) 60 MG capsule Take 1 capsule by mouth Daily.   2/25/2025    Emgality 120 MG/ML auto-injector pen Inject 1 mL under the skin into the appropriate area as directed Every 30 (Thirty) Days.   Past Week    estradiol (ESTRACE) 1 MG tablet Take 1 tablet by mouth Daily.   2/25/2025    FLUoxetine (PROzac) 10 MG capsule Take 1 capsule by mouth Daily.   2/25/2025    isosorbide mononitrate (IMDUR) 30 MG 24 hr tablet Take 1 tablet by mouth Daily. 30 tablet 11 2/25/2025    levothyroxine (SYNTHROID, LEVOTHROID) 100 MCG tablet Take 1 tablet by mouth Daily.   2/25/2025    Linzess 145 MCG capsule capsule Take 1 capsule by mouth Every Morning Before Breakfast.   2/25/2025    lovastatin (MEVACOR) 20 MG tablet Take 1 tablet by mouth Every Evening.   2/25/2025    nystatin (MYCOSTATIN) 137345 UNIT/GM ointment Apply 1 Application topically to the appropriate area as directed 2 (Two) Times a Day.   2/25/2025    omega-3 acid ethyl esters (LOVAZA) 1 g capsule Take 2 capsules by mouth 2 (Two) Times a Day.   2/25/2025    omeprazole (priLOSEC) 40 MG capsule Take 1 capsule by mouth 2 (Two) Times a Day. 60 capsule 3 2/25/2025    rOPINIRole (REQUIP) 0.5 MG tablet Take 1 tablet by mouth Daily.   2/25/2025    spironolactone (ALDACTONE) 50 MG tablet Take 1 tablet by mouth 2 (Two) Times a Day.   2/25/2025    Tirzepatide 10 MG/0.5ML solution auto-injector Inject 10 mg under the skin into the appropriate area as directed 1 (One) Time Per Week.   Past Week    vitamin D (ERGOCALCIFEROL) 1.25 MG (27874 UT) capsule capsule Take 1 capsule by mouth 1 (One) Time Per Week.   Past Week    albuterol sulfate  (90 Base) MCG/ACT inhaler Inhale 2 puffs Every 4 (Four) Hours As Needed for Wheezing or Shortness of Air.   Unknown    clonazePAM (KlonoPIN) 0.5 MG tablet Take 1 tablet by mouth 2 (Two) Times a Day As Needed for Anxiety.   Unknown     ipratropium-albuterol (DUO-NEB) 0.5-2.5 mg/3 ml nebulizer Take 3 mL by nebulization Every 4 (Four) Hours As Needed for Wheezing. 360 mL 0 Unknown   , Scheduled Meds:  amLODIPine, 5 mg, Oral, Q24H   And  lisinopril, 40 mg, Oral, Q24H  aspirin, 81 mg, Oral, Daily  atenolol, 25 mg, Oral, Daily  atorvastatin, 10 mg, Oral, Daily  azithromycin, 500 mg, Intravenous, Q24H  cefTRIAXone, 2,000 mg, Intravenous, Q24H  cetirizine, 10 mg, Oral, Daily  cholecalciferol, 50,000 Units, Oral, Weekly  cloNIDine, 0.2 mg, Oral, Q8H  donepezil, 10 mg, Oral, Nightly  DULoxetine, 60 mg, Oral, Daily  estradiol, 1 mg, Oral, Daily  FLUoxetine, 10 mg, Oral, Daily  insulin lispro, 2-7 Units, Subcutaneous, 4x Daily AC & at Bedtime  isosorbide mononitrate, 30 mg, Oral, Daily  levothyroxine, 100 mcg, Oral, QAM AC  lubiprostone, 8 mcg, Oral, BID  melatonin, 10 mg, Oral, Nightly  nystatin, 1 Application, Topical, BID  pantoprazole, 40 mg, Oral, Q AM  potassium chloride, 40 mEq, Oral, Once  rOPINIRole, 0.5 mg, Oral, Daily  sodium chloride, 10 mL, Intravenous, Q12H  sodium chloride, 10 mL, Intravenous, Q12H  sodium chloride, 10 mL, Intravenous, Q12H  spironolactone, 50 mg, Oral, BID    , Continuous Infusions:    and Allergies:  Ampicillin and Clonidine    Objective     Vital Signs   Temp:  [97.5 °F (36.4 °C)-98.2 °F (36.8 °C)] 97.5 °F (36.4 °C)  Heart Rate:  [54-71] 61  Resp:  [16-35] 34  BP: ()/(40-82) 155/75    Physical Exam: No major changes             General-acutely ill in appearance, not in any acute distress  On high flow nasal cannula oxygen  HEENT- pupils equally reactive to light, normal in size, no scleral icterus    Neck-supple    Respiratory-respirations normal-on auscultation no wheezing no crackles, on high flow nasal cannula oxygen    Cardiovascular-  Normal S1 and S2. No S3, S4 or murmurs. No JVD, no carotid bruit and no edema, pulses normal bilaterally     GI-nontender nondistended bowel sounds  positive    CNS-nonfocal    Musculoskeletal -no edema  Extremities- no obvious deformity noticed     Psychiatric-mood good, good eye contact, alert awake oriented  Skin- no visible rash                                                                   Results Review:    LABS:    Lab Results   Component Value Date    GLUCOSE 169 (H) 02/28/2025    BUN 40 (H) 02/28/2025    CREATININE 1.26 (H) 02/28/2025    EGFRIFNONA 66 02/12/2021    BCR 31.7 (H) 02/28/2025    CO2 25.3 02/28/2025    CALCIUM 9.2 02/28/2025    ALBUMIN 3.3 (L) 02/27/2025    AST 38 (H) 02/27/2025    ALT 21 02/27/2025    WBC 16.37 (H) 02/28/2025    HGB 11.0 (L) 02/28/2025    HCT 34.4 02/28/2025    MCV 92.2 02/28/2025     02/28/2025     02/28/2025    K 3.3 (L) 02/28/2025     02/28/2025    ANIONGAP 13.7 02/28/2025       Lab Results   Component Value Date    INR 1.19 (H) 02/26/2025    INR 0.95 12/26/2024    PROTIME 15.2 (H) 02/26/2025    PROTIME 10.3 12/26/2024       Results from last 7 days   Lab Units 02/26/25  1843   INR  1.19*          I reviewed the patient's new clinical results.  I reviewed the patient's new imaging results and agree with the interpretation.      Microbiology Results (last 10 days)       Procedure Component Value - Date/Time    Respiratory Panel PCR w/COVID-19(SARS-CoV-2) KORY/LESIA/TAWNY/PAD/COR/ENID In-House, NP Swab in UTM/VTM, 2 HR TAT - Swab, Nasopharynx [[id number]]  (Normal) Collected: 02/26/25 1321    Lab Status: Final result Specimen: Swab from Nasopharynx Updated: 02/26/25 1424     ADENOVIRUS, PCR Not Detected     Coronavirus 229E Not Detected     Coronavirus HKU1 Not Detected     Coronavirus NL63 Not Detected     Coronavirus OC43 Not Detected     COVID19 Not Detected     Human Metapneumovirus Not Detected     Human Rhinovirus/Enterovirus Not Detected     Influenza A PCR Not Detected     Influenza B PCR Not Detected     Parainfluenza Virus 1 Not Detected     Parainfluenza Virus 2 Not Detected     Parainfluenza  Virus 3 Not Detected     Parainfluenza Virus 4 Not Detected     RSV, PCR Not Detected     Bordetella pertussis pcr Not Detected     Bordetella parapertussis PCR Not Detected     Chlamydophila pneumoniae PCR Not Detected     Mycoplasma pneumo by PCR Not Detected    Narrative:      In the setting of a positive respiratory panel with a viral infection PLUS a negative procalcitonin without other underlying concern for bacterial infection, consider observing off antibiotics or discontinuation of antibiotics and continue supportive care. If the respiratory panel is positive for atypical bacterial infection (Bordetella pertussis, Chlamydophila pneumoniae, or Mycoplasma pneumoniae), consider antibiotic de-escalation to target atypical bacterial infection.    MRSA Screen, PCR (Inpatient) - Swab, Nares [[id number]]  (Normal) Collected: 02/26/25 1321    Lab Status: Final result Specimen: Swab from Nares Updated: 02/26/25 1445     MRSA PCR No MRSA Detected    Narrative:      The negative predictive value of this diagnostic test is high and should only be used to consider de-escalating anti-MRSA therapy. A positive result may indicate colonization with MRSA and must be correlated clinically.    Blood Culture - Blood, Arm, Right [[id number]]  (Normal) Collected: 02/26/25 0941    Lab Status: Preliminary result Specimen: Blood from Arm, Right Updated: 02/28/25 1000     Blood Culture No growth at 2 days    Blood Culture - Blood, Arm, Left [[id number]]  (Normal) Collected: 02/26/25 0941    Lab Status: Preliminary result Specimen: Blood from Arm, Left Updated: 02/28/25 1000     Blood Culture No growth at 2 days            Latest Reference Range & Units 02/26/25 08:39   pH, Arterial 7.350 - 7.450 pH units 7.450   pCO2, Arterial 35.0 - 45.0 mm Hg 40.2   pO2, Arterial 83.0 - 108.0 mm Hg 77.1 (L)   HCO3, Arterial 20.0 - 26.0 mmol/L 27.9 (H)   Base Excess 0.0 - 2.0 mmol/L 3.6 (H)   O2 Saturation, Arterial 94.0 - 99.0 % 96.2   CO2 Content 22 -  33 mmol/L 29.1   A-a DO2 0.0 - 300.0 mmHg 561.3 (H)   Carboxyhemoglobin 0 - 5 % 1.7   Methemoglobin 0.00 - 3.00 % 0.20   Oxyhemoglobin 94 - 99 % 94.4   Hematocrit, Blood Gas 38.0 - 51.0 % 39.1   Hemoglobin, Blood Gas 13.5 - 17.5 g/dL 12.8 (L)   Site  Right Brachial   Imer's Test  N/A   Modality  NRB   FIO2 % 100   Ventilator Mode  NA   Barometric Pressure for Blood Gas mmHg 726   (L): Data is abnormally low  (H): Data is abnormally high  This result has an attachment that is not available.  Pulmonary Function Testing Report    Gregoria Pendleton 78 y.o. underwent pulmonary function testing today at the  [facility].    The patient underwent spirometry, lung volumes by body plethysmography,  and diffusion capacity testing testing.    All tests were appropriately administered via ATS/ERS criteria. Testing is  acceptable and repeatable.    Spirometry:  Proportionate reduction in FEV1 and FVC with a normal ratio suggestive of  a restrictive process.  There is no significant positive bronchodilator  response.    Lung Volumes:  Reduced TLC consistent with simple restriction.    Diffusion Capacity:  Diffusion capacity uncorrected for Hb is severely reduced. A reduced DLCO  with a low VA and low/normal KCO is a pattern that may suggest loss of  alveolar capillary structure with loss of lung volume in conditions such  as emphysema and ILD.  IVC was < 90% of VC per ATS guidelines.  Trend:  Compared to previous study dated 4/26/24, there has been no significant  change in FEV1 and FVC.  All Measurements     Exam End: 06/06/24 11:24    Specimen Collected: 06/06/24 07:33 Last Resulted: 06/06/24 17:02   Received From: [facility]  Result Received: 07/29/24 09:09      Assessment & Plan       Neurology-alert awake oriented no active issues going on    Respiratory-acute on chronic hypoxic respiratory failure-likely due to flareup of her interstitial lung disease.  Currently having diffuse alveolar hemorrhage.  Continue  diuretics to improve lung compliance and diffusion capacity.  Will replace potassium.  Received steroids in the ER.  Increase the dose of steroids to 1 g IV Solu-Medrol daily for next 3 days then will slowly taper over next 2 to 3 weeks.  Will change Solu-Medrol to 500 mg for next 3 days from tomorrow.    High flow settings reviewed and discussed with patient.  Remains on 60 L of flow and 63% of FiO2    Increasing creatinine.  Will hold lisinopril.  I discontinued spironolactone.    Chest x-ray-latest reviewed   Repeat chest x-ray ordered.    ABG-latest reviewed    If respiratory status gets worse can start on BiPAP with settings 20 x 10  Respiratory rate 20  Adjust FiO2 to maintain saturation 92 to 95%        Cardiology- hemodynamically -stable.  Continue to monitor HR- rate and rhythm, BP     Mukowzlnen-BYE-awdb start on on fluids.    Hold lisinopril and discontinued spironolactone  I/O-reviewed    GI-if improves can start on a diet    Hematology- CBC  Hb  platelet  WBC    ID  Culture-reviewed    Procalitonin Results:      Lab 02/26/25  0901   PROCALCITONIN 0.19        Endrocrinology- Maintain Blood sugar 140 -180      Electrolytes-   Mag and phos       DVT prophylaxis-  Continue      Case discussed with patient's  at bedside.        Echo-  Results for orders placed during the hospital encounter of 09/29/23    Adult Transthoracic Echo Complete w/ Color, Spectral and Contrast if necessary per protocol    Interpretation Summary    Normal left ventricular cavity size and wall thickness noted. All left ventricular wall segments contract normally.    Left ventricular ejection fraction appears to be 61 - 65%.    The aortic valve is structurally normal with no stenosis present. Trace aortic valve regurgitation is present.    The mitral valve is structurally normal with no significant stenosis present. Mild mitral valve regurgitation is present.    . There is no evidence of pericardial effusion. .  Currently  patient is critically ill due to hypoxic respiratory failure requiring very high settings of high flow nasal cannula oxygen flareup of the ILD and diffuse alveolar hemorrhage.  Patient is very high risk for respiratory failure will monitor closely and might need intubation.          Acute hypoxic respiratory failure          Maikel Trevino MD  02/28/25  12:45 EST

## 2025-02-28 NOTE — PLAN OF CARE
Goal Outcome Evaluation:  Plan of Care Reviewed With: patient, spouse        Progress: no change  Outcome Evaluation: Pt is alert and oriented. VSS on heated HFNC. NSR. Adequate UOP. Pt remains NPO, NG placed for nutrition.  at bedside. Care ongoing.            Problem: Adult Inpatient Plan of Care  Goal: Plan of Care Review  Outcome: Met  Flowsheets (Taken 2/28/2025 1611)  Progress: no change  Outcome Evaluation: Pt is alert and oriented. VSS on heated HFNC. NSR. Adequate UOP. Pt remains NPO, NG placed for nutrition.  at bedside. Care ongoing.  Plan of Care Reviewed With:   patient   spouse     Problem: Adult Inpatient Plan of Care  Goal: Patient-Specific Goal (Individualized)  Outcome: Met

## 2025-02-28 NOTE — PLAN OF CARE
Goal Outcome Evaluation:              Outcome Evaluation: Patient alert and oriented. Blood pressure improved. HHF in place. Call light within reach. Plan of care ongoing.

## 2025-02-28 NOTE — THERAPY EVALUATION
Patient Name: Gregoria Pendleton  : 1945    MRN: [id number]                              Today's Date: 2025       Admit Date: 2025    Visit Dx: debility    ICD-10-CM ICD-9-CM   1. Pulmonary fibrosis  J84.10 515   2. Aspiration pneumonia of both lungs, unspecified aspiration pneumonia type, unspecified part of lung  J69.0 507.0     Patient Active Problem List   Diagnosis    Pulmonary fibrosis    Essential hypertension    Cryptogenic stroke    Esophageal dysphagia    Gastroesophageal reflux disease    Acute hypoxic respiratory failure     Past Medical History:   Diagnosis Date    Arthritis     Disease of thyroid gland     Elevated cholesterol     GERD (gastroesophageal reflux disease)     Hyperlipidemia     Hypertension     Migraine     Pulmonary fibrosis     Stroke      Past Surgical History:   Procedure Laterality Date    CYSTOSCOPY W/ LASER LITHOTRIPSY  01/15/2024    ENDOSCOPY N/A 2024    Procedure: ESOPHAGOGASTRODUODENOSCOPY WITH BIOPSY;  Surgeon: Jaky Goddard MD;  Location: [facility];  Service: Gastroenterology;  Laterality: N/A;  dilated to 20mm    HYSTERECTOMY  age 38    TUBAL ABDOMINAL LIGATION Bilateral       General Information       Row Name 25 1342          OT Time and Intention    Subjective Information complains of;weakness;fatigue;dyspnea  -LM     Document Type evaluation  -LM     Mode of Treatment occupational therapy  -LM     Patient Effort good  -LM     Symptoms Noted During/After Treatment fatigue;shortness of breath  -LM       Row Name 25 1348          General Information    Patient Profile Reviewed yes  -LM     Prior Level of Function independent:;min assist:;ADL's;transfer;all household mobility  O2, vern, bsc, sh chair  -LM     Existing Precautions/Restrictions fall;oxygen therapy device and L/min  -LM     Barriers to Rehab medically complex  -LM       Row Name 25 1343          Living Environment    People in Home other (see comments)  ex  spouse  -       Row Name 02/28/25 1342          Cognition    Orientation Status (Cognition) oriented x 3  -LM       Row Name 02/28/25 1342          Safety Issues/Impairments Affecting Functional Mobility    Impairments Affecting Function (Mobility) balance;coordination;endurance/activity tolerance;strength  -LM               User Key  (r) = Recorded By, (t) = Taken By, (c) = Cosigned By      Initials Name Provider Type    LM Ruth Curtis, OT Occupational Therapist                     Mobility/ADL's       Row Name 02/28/25 1343          Transfers    Comment, (Transfers) sat eob only  -LM       Row Name 02/28/25 1343          Activities of Daily Living    BADL Assessment/Intervention bathing;upper body dressing;grooming;lower body dressing;feeding;toileting  -       Row Name 02/28/25 1343          Bathing Assessment/Intervention    [place] Level (Bathing) dependent (less than 25% patient effort)  -LM       Row Name 02/28/25 1343          Upper Body Dressing Assessment/Training    [place] Level (Upper Body Dressing) dependent (less than 25% patient effort);maximum assist (25% patient effort)  -LM       Row Name 02/28/25 1343          Grooming Assessment/Training    [place] Level (Grooming) maximum assist (25% patient effort)  -LM       Row Name 02/28/25 1343          Lower Body Dressing Assessment/Training    [place] Level (Lower Body Dressing) dependent (less than 25% patient effort)  -       Row Name 02/28/25 1343          Self-Feeding Assessment/Training    [place] Level (Feeding) set up  -       Row Name 02/28/25 1343          Toileting Assessment/Training    [place] Level (Toileting) dependent (less than 25% patient effort)  -LM               User Key  (r) = Recorded By, (t) = Taken By, (c) = Cosigned By      Initials Name Provider Type    LM Ruth Curtis, OT Occupational Therapist                   Obj/Interventions       Row Name 02/28/25 1343          Sensory Assessment  (Somatosensory)    Sensory Assessment (Somatosensory) sensation intact  -LM       Row Name 02/28/25 1343          Vision Assessment/Intervention    Visual Impairment/Limitations WFL  -LM       Row Name 02/28/25 1343          Range of Motion Comprehensive    General Range of Motion no range of motion deficits identified  -LM       [facility] Name 02/28/25 1343          Strength Comprehensive (MMT)    General Manual Muscle Testing (MMT) Assessment upper extremity strength deficits identified  -LM     Comment, General Manual Muscle Testing (MMT) Assessment 3/5  -LM       Row Name 02/28/25 1343          Motor Skills    Motor Skills functional endurance  -LM     Functional Endurance P+  -LM               User Key  (r) = Recorded By, (t) = Taken By, (c) = Cosigned By      Initials Name Provider Type    LM Ruth Curtis, OT Occupational Therapist                   Goals/Plan       [facility] Name 02/28/25 1345          Transfer Goal 1 (OT)    Activity/Assistive Device (Transfer Goal 1, OT) transfers, all;walker, rolling  -LM     [place] Level/Cues Needed (Transfer Goal 1, OT) minimum assist (75% or more patient effort);moderate assist (50-74% patient effort)  -LM     Time Frame (Transfer Goal 1, OT) by discharge  -LM       [facility] Name 02/28/25 1345          Grooming Goal 1 (OT)    [place] (Grooming Goal 1, OT) standby assist;set-up required  -LM     Time Frame (Grooming Goal 1, OT) by discharge  -LM       Row Name 02/28/25 1345          Therapy Assessment/Plan (OT)    Planned Therapy Interventions (OT) activity tolerance training;adaptive equipment training;BADL retraining;transfer/mobility retraining;strengthening exercise;ROM/therapeutic exercise;patient/caregiver education/training  -LM               User Key  (r) = Recorded By, (t) = Taken By, (c) = Cosigned By      Initials Name Provider Type    LM Ruth Curtis, OT Occupational Therapist                   Clinical Impression       Row Name 02/28/25 1349          Plan of  Care Review    Plan of Care Reviewed With patient;other (see comments)  ex spouse  -LM       Row Name 02/28/25 1344          Therapy Assessment/Plan (OT)    Patient/Family Therapy Goal Statement (OT) return to plof  -LM     Rehab Potential (OT) good  -LM     Criteria for Skilled Therapeutic Interventions Met (OT) yes;meets criteria;skilled treatment is necessary  -LM     Therapy Frequency (OT) other (see comments)  prn and/or to monitor fxl progress  -LM       Row Name 02/28/25 1344          Therapy Plan Review/Discharge Plan (OT)    Anticipated Discharge Disposition (OT) --  TBD  -LM       Row Name 02/28/25 1344          Positioning and Restraints    Post Treatment Position bed  -LM     In Bed notified nsg;with family/caregiver;call light within reach;encouraged to call for assist;exit alarm on;supine  -LM               User Key  (r) = Recorded By, (t) = Taken By, (c) = Cosigned By      Initials Name Provider Type    LM Ruth Curtis, OT Occupational Therapist                   Outcome Measures       Row Name 02/28/25 0820 02/28/25 0230       How much help from another person do you currently need...    Turning from your back to your side while in flat bed without using bedrails? 3  -BS 3  -KP    Moving from lying on back to sitting on the side of a flat bed without bedrails? 3  -BS 3  -KP    Moving to and from a bed to a chair (including a wheelchair)? 2  -BS 2  -KP    Standing up from a chair using your arms (e.g., wheelchair, bedside chair)? 2  -BS 2  -KP    Climbing 3-5 steps with a railing? 2  -BS 2  -KP    To walk in hospital room? 2  -BS 2  -KP    AM-PAC 6 Clicks Score (PT) 14  -BS 14  -KP              User Key  (r) = Recorded By, (t) = Taken By, (c) = Cosigned By      Initials Name Provider Type    Vero Wood, RN Registered Nurse    Chinyere Kemp, RN Registered Nurse                    Occupational Therapy Education        No education to display                  OT Recommendation and  Plan  Planned Therapy Interventions (OT): activity tolerance training, adaptive equipment training, BADL retraining, transfer/mobility retraining, strengthening exercise, ROM/therapeutic exercise, patient/caregiver education/training  Therapy Frequency (OT): other (see comments) (prn and/or to monitor fxl progress)  Plan of Care Review  Plan of Care Reviewed With: patient, other (see comments) (ex spouse)     Time Calculation:          Therapy Charges for Today       Code Description Service Date Service Provider Modifiers Qty    [id number]  OT EVAL MOD COMPLEXITY 4 2/28/2025 Ruth Curtis, OT GO 1                 Ruth Curtis OT  2/28/2025

## 2025-02-28 NOTE — THERAPY EVALUATION
"Acute Care - Speech Language Pathology   Swallow Initial Evaluation [facility]  CLINICAL DYSPHAGIA ASSESSMENT     Patient Name: Gregoria Pendleton  : 1945  MRN: [id number]  Today's Date: 2025Onset of Illness/Injury or Date of Surgery: 25 Referring Physician: Sarina  Admit Date: 2025  Gregoria Pendleton  was seen at bedside this am on PCU-213B to assess safety/efficacy of swallowing fnx, determine safest/least restrictive diet tolerance. She has a PMH significant for  pulmonary fibrosis, Hx CVA, GERD, dysphagia, thyroid disease, HTN, HLD, hx CVA approximately 2 years ago, and dysphagia w/ esophageal dilation required.     Per EMR review, \"She and spouse report she has been increasingly more short of breath for about the past week. She is having increased cough but denies much sputum production. She does wear 2L supplemental O2 at baseline. She has not had any fevers, chills. No known sick contacts. She does complain of rhinorrhea. No abdominal pain. She complains of constipation.   She has history of dysphagia- needs to have esophagus stretched periodically. For the past few weeks she has had more difficulty swallowing. Does not eat a modified diet at baseline, has occasional difficulty swallowing pills. Last night while eating dinner she \"strangled\" but after clearing her O2 sats normalized at home. She went on to bed and upon waking this morning they found her O2 saturation to be 33% on home pulse ox. Her  noted her to have some hemoptysis this morning as well and she did have one episode of vomiting. She denies recent epistaxis\".     Upon arrival to the ED, vital signs were temp 97.4, heart rate 107, respirations 35, SpO2 44% on nonrebreather, /78.  ABG obtained on 100% FiO2 nonrebreather showed pH normal, pCO2 normal, pO2 77.1.  Her initial HS troponin was 28 with repeat at 33, proBNP is 3228.  White blood cell count is elevated at 21.42 with D-dimer 1.73, lactate 3.4 and Pro-Brooks " "normal.  CT PE protocol negative for PE-did note diffuse bilateral airspace pneumonia-dense consolidation concerning for alveolar hemorrhage.     She was unfamiliar to SLP department of [facility] prior to this consult. No SLP notes were available for review from outside facilities at this time per EMR review. She is currently NPO w/ sips permitted w/ meds.     She is s/p clinical dysphagia assessment on 2/27 at which time she was evidenced w/ the following: \"...a generally weak overall oropharyngeal swallow w/ overt s/s concerning for aspiration demonstrated intermittently w/ thin water. Silent aspiration is unable to be definitively ruled out via this subjective assessment. Given findings from this assessment, leukocytosis, hx of dysphagia, hx CVA, recent choking event, and current chest imaging, she is felt to most benefit from instrumental MBS to r/o aspiration prior to po diet recommendations\".     She has continued NPO w/ ice chips/water provided. MBS was unable to be performed 2/27 per O2 requirements.     This date, Ms Pendleton continues on HHFNC.     Social History     Socioeconomic History    Marital status:    Tobacco Use    Smoking status: Never     Passive exposure: Never    Smokeless tobacco: Never   Vaping Use    Vaping status: Never Used   Substance and Sexual Activity    Alcohol use: Never    Drug use: Never    Sexual activity: Defer      Imaging:  Radiology Results (last 21 days)  Procedure Component Value Units Date/Time   CT Abdomen Pelvis Without Contrast [[id number]] Review Not Required   Order Status: Sent Resulted: 02/26/25 1948    Updated: 02/26/25 2001    CT Angiogram Chest Pulmonary Embolism [[id number]] Josh as Reviewed   Order Status: Completed Collected: 02/26/25 1007    Updated: 02/26/25 1010   Narrative:     EXAM:    CT Angiography Chest With Intravenous Contrast     EXAM DATE:    2/26/2025 10:07 AM     CLINICAL HISTORY:    elevated d dimer     TECHNIQUE:    Axial computed tomographic " angiography images of the chest with  intravenous contrast.  This CT exam was performed using one or more of  the following dose reduction techniques:  automated exposure control,  adjustment of the mA and/or kV according to patient size, and/or use of  iterative reconstruction technique.    MIP reconstructed images were created and reviewed.     COMPARISON:    No relevant prior studies available.     FINDINGS:    Pulmonary arteries:  Unremarkable as visualized.  No pulmonary  embolism.    Aorta:  No acute findings.  No thoracic aortic aneurysm.    Lungs and pleural spaces:  Diffuse bilateral airspace pneumonia and  given dense consolidation consider alveolar hemorrhage.  No significant  effusion.    Heart:  Unremarkable as visualized.  No cardiomegaly.  No significant  pericardial effusion.  No evidence of RV dysfunction.    Bones/joints:  No acute fracture.  No dislocation.    Soft tissues:  Unremarkable as visualized.    Lymph nodes:  Unremarkable as visualized.  No enlarged lymph nodes.      Impression:     1.  Diffuse bilateral airspace pneumonia and given dense consolidation  consider alveolar hemorrhage.  2.  No pulmonary embolism.     This report was finalized on 2/26/2025 10:08 AM by Dr. Theodore Bautista MD.       XR Chest AP [[id number]] Josh as Reviewed   Order Status: Completed Collected: 02/26/25 0942    Updated: 02/26/25 0944   Narrative:     EXAM:    XR Chest, 1 View     EXAM DATE:    2/26/2025 9:42 AM     CLINICAL HISTORY:    hypoxemia     TECHNIQUE:    Frontal view of the chest.     COMPARISON:    2/1/2025     FINDINGS:    Lungs and pleural spaces:  Patchy bilateral airspace disease.  No  consolidation.  No pneumothorax.    Heart:  Unremarkable as visualized.  No cardiomegaly.    Mediastinum:  Unremarkable as visualized.  Normal mediastinal contour.    Bones/joints:  Unremarkable as visualized.  No acute fracture.      Impression:       Patchy bilateral airspace disease.     This report was finalized on  2/26/2025 9:42 AM by Dr. Theodore Bautista MD.        Labs:   Latest Reference Range & Units 02/26/25 09:01 02/27/25 00:58 02/28/25 01:28   WBC 3.40 - 10.80 10*3/mm3 21.42 (H) 17.35 (H) 16.37 (H)   RBC 3.77 - 5.28 10*6/mm3 4.49 4.32 3.73 (L)   Hemoglobin 12.0 - 15.9 g/dL 13.3 12.6 11.0 (L)   Hematocrit 34.0 - 46.6 % 40.9 38.7 34.4   Platelets 140 - 450 10*3/mm3 346 328 320   RDW 12.3 - 15.4 % 14.6 14.6 14.8   MCV 79.0 - 97.0 fL 91.1 89.6 92.2   MCH 26.6 - 33.0 pg 29.6 29.2 29.5   MCHC 31.5 - 35.7 g/dL 32.5 32.6 32.0   MPV 6.0 - 12.0 fL 10.1 9.8 10.6   RDW-SD 37.0 - 54.0 fl 48.8 47.5 49.9   (H): Data is abnormally high  (L): Data is abnormally low  Diet Orders (active) (From admission, onward)       Start     Ordered    02/27/25 1058  NPO Diet NPO Type: Ice Chips, Other (see comments)  Diet Effective Now        Comments: Ice chips and/or sips thin water provided per pt request.   Medications via non-oral route.    02/27/25 1057                  Pt and family member report pt having difficulty swallowing for several years, however this increased following CVA two years ago. She and spouse deny SLP evaluation prior to this.     Ms Pendleton was observed on HHFNC at 60 L/min at 60% w/o complications across this assessment. She is increasingly conversant this date and reports feeling better overall.     She was positioned upright and centered in bed to accept multiple po presentations of ice chips and thin liquids via spoon, cup, and straw.  She was noted in improvement in ability to self provide po trials.     Upon po presentations, adequate bolus anticipation and acceptance w/ good labial seal for bolus clearance via spoon bowl, cup rim stability and suction via straw. Bolus formation, manipulation and control were mildly prolonged w/ primarily rotary mastication pattern. A-p transit was delayed w/o significant oral residue appreciated. No overt s/s aspiration before the swallow.      Pharyngeal swallow was delayed in  initiation w/ weak hyolaryngeal elevation per palpation. Overt s/s aspiration were demonstrated intermittently w/ thin liquids via cup presentation. Silent aspiration is unable to be definitively ruled out via this subjective assessment.    Visit Dx:     ICD-10-CM ICD-9-CM   1. Pulmonary fibrosis  J84.10 515   2. Aspiration pneumonia of both lungs, unspecified aspiration pneumonia type, unspecified part of lung  J69.0 507.0     Patient Active Problem List   Diagnosis    Pulmonary fibrosis    Essential hypertension    Cryptogenic stroke    Esophageal dysphagia    Gastroesophageal reflux disease    Acute hypoxic respiratory failure     Past Medical History:   Diagnosis Date    Arthritis     Disease of thyroid gland     Elevated cholesterol     GERD (gastroesophageal reflux disease)     Hyperlipidemia     Hypertension     Migraine     Pulmonary fibrosis     Stroke      Past Surgical History:   Procedure Laterality Date    CYSTOSCOPY W/ LASER LITHOTRIPSY  01/15/2024    ENDOSCOPY N/A 12/12/2024    Procedure: ESOPHAGOGASTRODUODENOSCOPY WITH BIOPSY;  Surgeon: Jaky Goddard MD;  Location: [facility];  Service: Gastroenterology;  Laterality: N/A;  dilated to 20mm    HYSTERECTOMY  age 38    TUBAL ABDOMINAL LIGATION Bilateral      Impression:     Ms Pendleton presented w/ a generally weak overall oropharyngeal swallow w/ overt s/s concerning for aspiration demonstrated intermittently w/ thin water. Silent aspiration is unable to be definitively ruled out via this subjective assessment.     Given findings from this assessment, leukocytosis, hx of dysphagia, hx CVA, recent choking event, and current chest imaging, she continues to be felt to most benefit from instrumental MBS to r/o aspiration prior to po diet recommendations.     Current [facility] requirements, prevent pt from being able to participate in MBS at this time. Per this, she is recommended to continue NPO w/ ice chips and/or sips thin water provided upon pt  request w/ medications administered via non-oral route.     SLP Recommendation and Plan     1. NPO.   2. Ice chips and/or sips thin water provided per pt request.    3. Medicatoins via non-oral route.    4. Upright and centered for all po intake  5. JACK precautions.  6. Oral care protocol.  7. Consider candidacy for temporary non-oral route for nutrition/hydration.     SLP to f/u for improvement in pt status to allow participation in instrumental dysphagia evaluation.     D/w patient results and recommendations w/ verbal agreement.    D/w RN results and recommendations w/ verbal agreement.    Thank you for allowing me to participate in the care of your patient-  Danni Fischer M.S., CCC-SLP        EDUCATION  The patient has been educated in the following areas:   Dysphagia (Swallowing Impairment) Oral Care/Hydration NPO rationale.        Time Calculation:       Danni Fischer MS CCC-SLP  2/28/2025

## 2025-02-28 NOTE — CASE MANAGEMENT/SOCIAL WORK
Discharge Planning Assessment  [facility]     Patient Name: Gregoria Pendleton  MRN: [id number]  Today's Date: 2/28/2025    Admit Date: 2/26/2025     Discharge Plan       Row Name 02/28/25 1205       Plan    Plan Pt remains on heated; high flow at 60 liters. Pt lives at [address] [place] [address] ([facility]) with ex-spouse Jarocho and plans to return back home at discharge. Pt does not utilize  services at this time. Pt PCP Marisa Arias. Pt has rollator, (s) cane, BSC, wheelchair via family and home o2 at 2 liters via susy-rite. Pt has no POA or living will. Pt voiced she utilizes her rollator and cane when ambulating at home. Pt voiced her ex-spouse assist with her care at home. Pt family to provide transportation at discharge. SS to follow and assist with discharge planning.               LILIAM PrajapatiW

## 2025-03-01 NOTE — PROGRESS NOTES
Referring Provider: Dr. Avila  Reason for Consultation: Abnormal CT chest, ILD and DAH      Chief complaint -shortness of breath and coughing up blood      Sub-overnight events reviewed    All the labs medications ins and outs and vitals reviewed.  Patient resting in bed comfortably.  FiO2 requirements went up.  Will get a procalcitonin level tomorrow morning.  Creatinine slightly better.  Repeat chest x-ray reviewed and shows slight increase in the left lower lobe opacities.    I requirement does not improve consider getting a CT chest tomorrow morning.  Will repeat INR tomorrow morning  Review of Systems   Still coughing up but is clear phlegm now..    Otherwise negative    History  Past Medical History:   Diagnosis Date    Arthritis     Disease of thyroid gland     Elevated cholesterol     GERD (gastroesophageal reflux disease)     Hyperlipidemia     Hypertension     Migraine     Pulmonary fibrosis     Stroke    ,   Past Surgical History:   Procedure Laterality Date    CYSTOSCOPY W/ LASER LITHOTRIPSY  01/15/2024    ENDOSCOPY N/A 12/12/2024    Procedure: ESOPHAGOGASTRODUODENOSCOPY WITH BIOPSY;  Surgeon: Jaky Goddard MD;  Location: [facility];  Service: Gastroenterology;  Laterality: N/A;  dilated to 20mm    HYSTERECTOMY  age 38    TUBAL ABDOMINAL LIGATION Bilateral    ,   Family History   Problem Relation Age of Onset    Heart failure Father     Heart disease Sister     Heart attack Sister     Breast cancer Neg Hx    ,   Social History     Tobacco Use    Smoking status: Never     Passive exposure: Never    Smokeless tobacco: Never   Vaping Use    Vaping status: Never Used   Substance Use Topics    Alcohol use: Never    Drug use: Never   ,   Medications Prior to Admission   Medication Sig Dispense Refill Last Dose/Taking    amLODIPine-benazepril (LOTREL) 5-40 MG per capsule Take 1 capsule by mouth Daily.   2/25/2025    aspirin 81 MG EC tablet Take 1 tablet by mouth Daily.   2/25/2025 Morning     atenolol (TENORMIN) 25 MG tablet Take 1 tablet by mouth Daily.   2/25/2025    cetirizine (zyrTEC) 10 MG tablet Take 1 tablet by mouth Daily.   2/25/2025    cloNIDine (CATAPRES) 0.2 MG tablet Take 1 tablet by mouth 3 times a day.   2/25/2025    donepezil (ARICEPT) 10 MG tablet Take 1 tablet by mouth Every Night.   2/25/2025    DULoxetine (CYMBALTA) 60 MG capsule Take 1 capsule by mouth Daily.   2/25/2025    Emgality 120 MG/ML auto-injector pen Inject 1 mL under the skin into the appropriate area as directed Every 30 (Thirty) Days.   Past Week    estradiol (ESTRACE) 1 MG tablet Take 1 tablet by mouth Daily.   2/25/2025    FLUoxetine (PROzac) 10 MG capsule Take 1 capsule by mouth Daily.   2/25/2025    isosorbide mononitrate (IMDUR) 30 MG 24 hr tablet Take 1 tablet by mouth Daily. 30 tablet 11 2/25/2025    levothyroxine (SYNTHROID, LEVOTHROID) 100 MCG tablet Take 1 tablet by mouth Daily.   2/25/2025    Linzess 145 MCG capsule capsule Take 1 capsule by mouth Every Morning Before Breakfast.   2/25/2025    lovastatin (MEVACOR) 20 MG tablet Take 1 tablet by mouth Every Evening.   2/25/2025    nystatin (MYCOSTATIN) 991721 UNIT/GM ointment Apply 1 Application topically to the appropriate area as directed 2 (Two) Times a Day.   2/25/2025    omega-3 acid ethyl esters (LOVAZA) 1 g capsule Take 2 capsules by mouth 2 (Two) Times a Day.   2/25/2025    omeprazole (priLOSEC) 40 MG capsule Take 1 capsule by mouth 2 (Two) Times a Day. 60 capsule 3 2/25/2025    rOPINIRole (REQUIP) 0.5 MG tablet Take 1 tablet by mouth Daily.   2/25/2025    spironolactone (ALDACTONE) 50 MG tablet Take 1 tablet by mouth 2 (Two) Times a Day.   2/25/2025    Tirzepatide 10 MG/0.5ML solution auto-injector Inject 10 mg under the skin into the appropriate area as directed 1 (One) Time Per Week.   Past Week    vitamin D (ERGOCALCIFEROL) 1.25 MG (45875 UT) capsule capsule Take 1 capsule by mouth 1 (One) Time Per Week.   Past Week    albuterol sulfate  (90  Base) MCG/ACT inhaler Inhale 2 puffs Every 4 (Four) Hours As Needed for Wheezing or Shortness of Air.   Unknown    clonazePAM (KlonoPIN) 0.5 MG tablet Take 1 tablet by mouth 2 (Two) Times a Day As Needed for Anxiety.   Unknown    ipratropium-albuterol (DUO-NEB) 0.5-2.5 mg/3 ml nebulizer Take 3 mL by nebulization Every 4 (Four) Hours As Needed for Wheezing. 360 mL 0 Unknown   , Scheduled Meds:  amLODIPine, 5 mg, Oral, Q24H   And  [Held by provider] lisinopril, 40 mg, Oral, Q24H  aspirin, 81 mg, Oral, Daily  atenolol, 25 mg, Oral, Daily  atorvastatin, 10 mg, Oral, Daily  cefTRIAXone, 2,000 mg, Intravenous, Q24H  cetirizine, 10 mg, Oral, Daily  cholecalciferol, 50,000 Units, Oral, Weekly  cloNIDine, 0.2 mg, Oral, Q8H  donepezil, 10 mg, Oral, Nightly  DULoxetine, 60 mg, Oral, Daily  estradiol, 1 mg, Oral, Daily  FLUoxetine, 10 mg, Oral, Daily  insulin lispro, 2-7 Units, Subcutaneous, 4x Daily AC & at Bedtime  isosorbide mononitrate, 30 mg, Oral, Daily  levothyroxine, 100 mcg, Oral, QAM AC  lubiprostone, 8 mcg, Oral, BID  melatonin, 10 mg, Oral, Nightly  methylPREDNISolone sodium succinate, 500 mg, Intravenous, Daily  nystatin, 1 Application, Topical, BID  pantoprazole, 40 mg, Oral, Q AM  potassium chloride, 40 mEq, Oral, Once  rOPINIRole, 0.5 mg, Oral, Daily  sodium chloride, 10 mL, Intravenous, Q12H  sodium chloride, 10 mL, Intravenous, Q12H  sodium chloride, 10 mL, Intravenous, Q12H    , Continuous Infusions:    and Allergies:  Ampicillin and Clonidine    Objective     Vital Signs   Temp:  [97.2 °F (36.2 °C)-98.1 °F (36.7 °C)] 97.6 °F (36.4 °C)  Heart Rate:  [49-77] (P) 64  Resp:  [15-32] (P) 16  BP: ()/(56-78) (P) 161/68    Physical Exam: No major changes             General-acutely ill in appearance, not in any acute distress  On high flow nasal cannula oxygen  HEENT-atraumatic  Neck-supple    Respiratory-respirations normal-on auscultation no wheezing no crackles, on high flow nasal cannula  oxygen    Cardiovascular-NSR  GI-grossly normal    CNS-nonfocal    Musculoskeletal -no edema  Extremities- no obvious deformity noticed     Psychiatric-alert awake oriented  Skin- no visible rash                                                                   Results Review:    LABS:    Lab Results   Component Value Date    GLUCOSE 183 (H) 03/01/2025    BUN 56 (H) 03/01/2025    CREATININE 1.19 (H) 03/01/2025    EGFRIFNONA 66 02/12/2021    BCR 47.1 (H) 03/01/2025    CO2 25.8 03/01/2025    CALCIUM 8.9 03/01/2025    ALBUMIN 3.1 (L) 03/01/2025    AST 23 03/01/2025    ALT 16 03/01/2025    WBC 11.60 (H) 03/01/2025    HGB 10.4 (L) 03/01/2025    HCT 33.4 (L) 03/01/2025    MCV 93.0 03/01/2025     03/01/2025     03/01/2025    K 3.8 03/01/2025     03/01/2025    ANIONGAP 11.2 03/01/2025       Lab Results   Component Value Date    INR 1.19 (H) 02/26/2025    INR 0.95 12/26/2024    PROTIME 15.2 (H) 02/26/2025    PROTIME 10.3 12/26/2024       Results from last 7 days   Lab Units 02/26/25  1843   INR  1.19*          I reviewed the patient's new clinical results.  I reviewed the patient's new imaging results and agree with the interpretation.      Microbiology Results (last 10 days)       Procedure Component Value - Date/Time    Respiratory Panel PCR w/COVID-19(SARS-CoV-2) KORY/LESIA/TAWNY/PAD/COR/ENID In-House, NP Swab in UTM/VTM, 2 HR TAT - Swab, Nasopharynx [[id number]]  (Normal) Collected: 02/26/25 1321    Lab Status: Final result Specimen: Swab from Nasopharynx Updated: 02/26/25 1424     ADENOVIRUS, PCR Not Detected     Coronavirus 229E Not Detected     Coronavirus HKU1 Not Detected     Coronavirus NL63 Not Detected     Coronavirus OC43 Not Detected     COVID19 Not Detected     Human Metapneumovirus Not Detected     Human Rhinovirus/Enterovirus Not Detected     Influenza A PCR Not Detected     Influenza B PCR Not Detected     Parainfluenza Virus 1 Not Detected     Parainfluenza Virus 2 Not Detected      Parainfluenza Virus 3 Not Detected     Parainfluenza Virus 4 Not Detected     RSV, PCR Not Detected     Bordetella pertussis pcr Not Detected     Bordetella parapertussis PCR Not Detected     Chlamydophila pneumoniae PCR Not Detected     Mycoplasma pneumo by PCR Not Detected    Narrative:      In the setting of a positive respiratory panel with a viral infection PLUS a negative procalcitonin without other underlying concern for bacterial infection, consider observing off antibiotics or discontinuation of antibiotics and continue supportive care. If the respiratory panel is positive for atypical bacterial infection (Bordetella pertussis, Chlamydophila pneumoniae, or Mycoplasma pneumoniae), consider antibiotic de-escalation to target atypical bacterial infection.    MRSA Screen, PCR (Inpatient) - Swab, Nares [[id number]]  (Normal) Collected: 02/26/25 1321    Lab Status: Final result Specimen: Swab from Nares Updated: 02/26/25 1445     MRSA PCR No MRSA Detected    Narrative:      The negative predictive value of this diagnostic test is high and should only be used to consider de-escalating anti-MRSA therapy. A positive result may indicate colonization with MRSA and must be correlated clinically.    Blood Culture - Blood, Arm, Right [[id number]]  (Normal) Collected: 02/26/25 0941    Lab Status: Preliminary result Specimen: Blood from Arm, Right Updated: 03/01/25 1000     Blood Culture No growth at 3 days    Blood Culture - Blood, Arm, Left [[id number]]  (Normal) Collected: 02/26/25 0941    Lab Status: Preliminary result Specimen: Blood from Arm, Left Updated: 03/01/25 1000     Blood Culture No growth at 3 days            Latest Reference Range & Units 02/26/25 08:39   pH, Arterial 7.350 - 7.450 pH units 7.450   pCO2, Arterial 35.0 - 45.0 mm Hg 40.2   pO2, Arterial 83.0 - 108.0 mm Hg 77.1 (L)   HCO3, Arterial 20.0 - 26.0 mmol/L 27.9 (H)   Base Excess 0.0 - 2.0 mmol/L 3.6 (H)   O2 Saturation, Arterial 94.0 - 99.0 % 96.2    CO2 Content 22 - 33 mmol/L 29.1   A-a DO2 0.0 - 300.0 mmHg 561.3 (H)   Carboxyhemoglobin 0 - 5 % 1.7   Methemoglobin 0.00 - 3.00 % 0.20   Oxyhemoglobin 94 - 99 % 94.4   Hematocrit, Blood Gas 38.0 - 51.0 % 39.1   Hemoglobin, Blood Gas 13.5 - 17.5 g/dL 12.8 (L)   Site  Right Brachial   Imer's Test  N/A   Modality  NRB   FIO2 % 100   Ventilator Mode  NA   Barometric Pressure for Blood Gas mmHg 726   (L): Data is abnormally low  (H): Data is abnormally high  This result has an attachment that is not available.  Pulmonary Function Testing Report    Gregoria Pendleton 78 y.o. underwent pulmonary function testing today at the  [facility].    The patient underwent spirometry, lung volumes by body plethysmography,  and diffusion capacity testing testing.    All tests were appropriately administered via ATS/ERS criteria. Testing is  acceptable and repeatable.    Spirometry:  Proportionate reduction in FEV1 and FVC with a normal ratio suggestive of  a restrictive process.  There is no significant positive bronchodilator  response.    Lung Volumes:  Reduced TLC consistent with simple restriction.    Diffusion Capacity:  Diffusion capacity uncorrected for Hb is severely reduced. A reduced DLCO  with a low VA and low/normal KCO is a pattern that may suggest loss of  alveolar capillary structure with loss of lung volume in conditions such  as emphysema and ILD.  IVC was < 90% of VC per ATS guidelines.  Trend:  Compared to previous study dated 4/26/24, there has been no significant  change in FEV1 and FVC.  All Measurements     Exam End: 06/06/24 11:24    Specimen Collected: 06/06/24 07:33 Last Resulted: 06/06/24 17:02   Received From: [facility]  Result Received: 07/29/24 09:09      Assessment & Plan       Neurology-alert awake oriented no active issues going on    Respiratory-acute on chronic hypoxic respiratory failure-likely due to flareup of her interstitial lung disease.  Currently having diffuse alveolar  hemorrhage.    Continue Solu-Medrol 500 mg IV for another 3 days.  FiO2 requirement today morning went up.  Chest x-ray reviewed.  If FiO2 requirement does not change consider adding antifungal agents.  Will get procalcitonin level tomorrow morning.  Patient might have aspirated.  Changed antibiotics to Zosyn.  Increasing creatinine.  Will hold lisinopril.  I discontinued spironolactone.    Chest x-ray-latest reviewed   Repeat chest x-ray ordered.    ABG-latest reviewed    If respiratory status gets worse can start on BiPAP with settings 20 x 10  Respiratory rate 20  Adjust FiO2 to maintain saturation 92 to 95%        Cardiology- hemodynamically -stable.  Continue to monitor HR- rate and rhythm, BP     Nephrology-OMID-creatinine better.  Continue to monitor.  Hold lisinopril and discontinued spironolactone  I/O-reviewed    GI-if improves can start on a diet    Hematology- CBC  Hb  platelet  WBC    ID  Culture-reviewed    Procalitonin Results:      Lab 02/26/25  0901   PROCALCITONIN 0.19        Endrocrinology- Maintain Blood sugar 140 -180      Electrolytes-   Mag and phos       DVT prophylaxis-  Continue      Case discussed with patient's  at bedside.        Echo-  Results for orders placed during the hospital encounter of 09/29/23    Adult Transthoracic Echo Complete w/ Color, Spectral and Contrast if necessary per protocol    Interpretation Summary    Normal left ventricular cavity size and wall thickness noted. All left ventricular wall segments contract normally.    Left ventricular ejection fraction appears to be 61 - 65%.    The aortic valve is structurally normal with no stenosis present. Trace aortic valve regurgitation is present.    The mitral valve is structurally normal with no significant stenosis present. Mild mitral valve regurgitation is present.    . There is no evidence of pericardial effusion. .  Currently patient is critically ill due to hypoxic respiratory failure requiring very high  settings of high flow nasal cannula oxygen flareup of the ILD and diffuse alveolar hemorrhage.  Patient is very high risk for respiratory failure will monitor closely and might need intubation.      This was an audio and video enabled telemedicine encounter.   consent for telemedicine is obtained by the hospital at the time of admission from patient or nok and is on chart     I am in [place], [place] and patient is in [facility]            Acute hypoxic respiratory failure          Maikel Trevino MD  03/01/25  11:31 EST

## 2025-03-01 NOTE — PLAN OF CARE
Goal Outcome Evaluation:        Outcome Evaluation: (P) Pt remains on heated high flow. NG tube replaced. Tube feed infusing. Care ongoing.

## 2025-03-01 NOTE — PROGRESS NOTES
[facility] HOSPITALIST PROGRESS NOTE    Subjective     History:   Gregoria Pendleton is a 79 y.o. female admitted on 2/26/2025 secondary to Acute hypoxic respiratory failure     Procedures: None    CC: Follow up resp failure     Patient seen and examined with GUY Bruner. Awake and alert with her  present at bedside. Continues to report dyspnea. Episode of scant blood-tinged sputum production this AM. No reported vomiting. Remains on heated HFNC. No acute events reported.     History taken from: patient, chart, and RN.      Objective     Vital Signs  Temp:  [97.2 °F (36.2 °C)-98.6 °F (37 °C)] 98.6 °F (37 °C)  Heart Rate:  [49-77] (P) 58  Resp:  [12-32] (P) 22  BP: ()/(56-78) (P) 174/75    Intake/Output Summary (Last 24 hours) at 3/1/2025 1418  Last data filed at 3/1/2025 1226  Gross per 24 hour   Intake 772.17 ml   Output 0 ml   Net 772.17 ml         Physical Exam: Unchanged from previous.   General:    Awake, alert, in no acute distress, ill appearing   Heart:      Normal S1 and S2. Regular rate and rhythm. No significant murmur, rubs or gallops appreciated.   Lungs:     Respirations regular, even and unlabored. Diminished breath sounds throughout.    Abdomen:   Soft and nontender. No guarding, rebound tenderness or  organomegaly noted. Bowel sounds present x 4.   Extremities:  No clubbing, cyanosis or edema noted. Moves UE and LE equally B/L.     Results Review:    Results from last 7 days   Lab Units 03/01/25 0101 02/28/25 0128 02/27/25 0058 02/26/25  0901   WBC 10*3/mm3 11.60* 16.37* 17.35* 21.42*   HEMOGLOBIN g/dL 10.4* 11.0* 12.6 13.3   PLATELETS 10*3/mm3 298 320 328 346     Results from last 7 days   Lab Units 03/01/25 0101 02/28/25 0128 02/27/25  0058 02/26/25  0901   SODIUM mmol/L 143 144 141 141   POTASSIUM mmol/L 3.8 3.3* 3.3* 4.2   CHLORIDE mmol/L 106 105 102 102   CO2 mmol/L 25.8 25.3 23.2 26.5   BUN mg/dL 56* 40* 13 11   CREATININE mg/dL 1.19* 1.26* 0.84 0.79   CALCIUM  mg/dL 8.9 9.2 9.1 9.3   GLUCOSE mg/dL 183* 169* 182* 184*     Results from last 7 days   Lab Units 03/01/25  0101 02/27/25  0058 02/26/25  0901   BILIRUBIN mg/dL 0.2 0.5 0.5   ALK PHOS U/L 90 110 120*   AST (SGOT) U/L 23 38* 54*   ALT (SGPT) U/L 16 21 23     Results from last 7 days   Lab Units 03/01/25  0101 02/28/25  0128 02/27/25  0058   MAGNESIUM mg/dL 2.6* 2.3 2.1     Results from last 7 days   Lab Units 02/26/25  1843   INR  1.19*     Results from last 7 days   Lab Units 02/26/25  0956 02/26/25  0901   HSTROP T ng/L 33* 28*       Imaging Results (Last 24 Hours)       Procedure Component Value Units Date/Time    XR Chest 1 View [[id number]] Collected: 03/01/25 1209     Updated: 03/01/25 1213    Narrative:       VERIFICATION OBSERVER NAME: Hermes Becker MD.     TECHNIQUE, ADDITIONAL HISTORY, FINDINGS: Single frontal view of the  chest was obtained.   Comparison study date : Same day. Time : 10:45 AM.       Placement of Dobbhoff catheter with the distal end in the stomach.  Cardiac enlargement.  Diffuse interstitial changes similar to prior.                 [id number], Zip code [id number].           This report was finalized on 3/1/2025 12:11 PM by Dr. Hermes Becker MD.       XR Chest 1 View [[id number]] Collected: 03/01/25 0752     Updated: 03/01/25 0755    Narrative:      PROCEDURE: Portable chest x-ray examination performed on March 1, 2025.  Single view.     HISTORY: NG tube placement. Confirm position.     COMPARISON: None.     FINDINGS:     Enlarged heart size  Enteric drain in place with tip to the stomach.  Hypoinflated lungs.  Patchy airspace opacities in the upper and lower lobes consistent with  multifocal pneumonia.  No pleural effusion or pneumothorax  No fracture or foreign body.  No free air in the upper abdomen.       Impression:         Multifocal pneumonia.  Enlarged heart size  Hypoinflated lungs  Enteric drain in place with tip to the stomach.     This report was finalized on 3/1/2025 7:53 AM by Skyler  MD Emanuel.       XR Chest 1 View [[id number]] Collected: 02/28/25 1745     Updated: 02/28/25 1749    Narrative:      Comparison: February 28, 2025 3:11 PM.     Nasogastric tube tip projects at anatomic region of the distal stomach  with side port projecting at the stomach. Low lung volumes limits  evaluation. Bilateral, diffuse airspace disease is noted. No  pneumothorax or pleural effusion identified.       Impression:      Impression:  1. Status post nasogastric tube placement.     This report was finalized on 2/28/2025 5:47 PM by Ryan Soares DO.       XR Chest 1 View [[id number]] Collected: 02/28/25 1545     Updated: 02/28/25 1547    Narrative:      EXAM:    XR Chest, 1 View     EXAM DATE:    2/28/2025 3:45 PM     CLINICAL HISTORY:    Hypoxia; J84.10-Pulmonary fibrosis, unspecified; J69.0-Pneumonitis due  to inhalation of food and vomit     TECHNIQUE:    Frontal view of the chest.     COMPARISON:    2/26/2025     FINDINGS:    Lungs and pleural spaces:  Again patchy bilateral airspace disease  grossly stable.  No consolidation.  No pneumothorax.    Heart:  Unremarkable as visualized.  No cardiomegaly.    Mediastinum:  Unremarkable as visualized.  Normal mediastinal contour.    Bones/joints:  Unremarkable as visualized.  No acute fracture.       Impression:        Again patchy bilateral airspace disease grossly stable.     This report was finalized on 2/28/2025 3:45 PM by Dr. Theodore Bautista MD.                 Medications:  amLODIPine, 5 mg, Oral, Q24H   And  [Held by provider] lisinopril, 40 mg, Oral, Q24H  aspirin, 81 mg, Oral, Daily  atenolol, 25 mg, Oral, Daily  atorvastatin, 10 mg, Oral, Daily  cefTRIAXone, 2,000 mg, Intravenous, Q24H  cetirizine, 10 mg, Oral, Daily  cholecalciferol, 50,000 Units, Oral, Weekly  cloNIDine, 0.2 mg, Oral, Q8H  donepezil, 10 mg, Oral, Nightly  DULoxetine, 60 mg, Oral, Daily  estradiol, 1 mg, Oral, Daily  FLUoxetine, 10 mg, Oral, Daily  insulin lispro, 2-7 Units, Subcutaneous, 4x  Daily AC & at Bedtime  isosorbide mononitrate, 30 mg, Oral, Daily  levothyroxine, 100 mcg, Oral, QAM AC  lubiprostone, 8 mcg, Oral, BID  melatonin, 10 mg, Oral, Nightly  methylPREDNISolone sodium succinate, 500 mg, Intravenous, Daily  nystatin, 1 Application, Topical, BID  pantoprazole, 40 mg, Oral, Q AM  potassium chloride, 40 mEq, Oral, Once  rOPINIRole, 0.5 mg, Oral, Daily  sodium chloride, 10 mL, Intravenous, Q12H  sodium chloride, 10 mL, Intravenous, Q12H  sodium chloride, 10 mL, Intravenous, Q12H                 Assessment & Plan   Acute on chronic hypoxic respiratory failure: Likely 2/2 exacerbation of pulmonary fibrosis with concern for DAH. Concern for possible pneumonia on CT with no evidence of PE. Cont high dose steroids. Intermittent diuresis as tolerated to assist with lung compliance. Completed course of azithromycin. Cont Rocephin empirically. Currently on heated HFNC. Cont to monitor closely. Pulm input appreciated.     Essential HTN: BP has been elevated with home medications resumed on admission with PRN regimen added as well. Episode of hypotension on 2/27 but improved. Lisinopril and spironolactone held as Cr began trending upward. Cont to monitor.     Hyperglycemia: Steroids likely contributing. HgbA1c 6.1. Cont SSI with Accuchecks.     OMID: Cr slightly improved today. Hold additional diuresis. Home lisinopril and spironolactone held. S/P IVF's started per pulm. Monitor UOP and repeat labs in the AM.     Mild hypokalemia: K+ improved with supplementation. Mg is >2. Cont to monitor.     Concern for dysphagia: NPO with sips with meds pending additional SLP eval. Unable to go for MBS at present 2/2 O2 requirements. NG placed with initiation of TF's.      HLD: Cont statin.     Hypothyroidism: Cont levothyroxine.     GERD: Cont PPI.     DVT PPX: SCD's      Disposition Pending clinical course. Will likely require several days of hospitalization in the setting of increased O2 requirements.      Lito Branch,   03/01/25  14:18 EST

## 2025-03-02 NOTE — PLAN OF CARE
Goal Outcome Evaluation:              Outcome Evaluation: Heated High flow in place. NG tube pulled some. Waiting on x-ray to result. HTN at times. Plan of care ongoing

## 2025-03-02 NOTE — NURSING NOTE
Patient removed NG tube. She states that she just got confused and wanted it out. She did allow staff to replace it and STAT chest xray was ordered for confirmation of placement before restarting tube feeds. Results pending at this time. Dr. Branch made aware.

## 2025-03-02 NOTE — PROGRESS NOTES
[facility] HOSPITALIST PROGRESS NOTE    Subjective     History:   Gregoria Pendleton is a 79 y.o. female admitted on 2/26/2025 secondary to Acute hypoxic respiratory failure     Procedures: None    CC: Follow up resp failure     Patient seen and examined with GUY Bruner. Awake and alert with her  present at bedside. Continues to report dyspnea but feels slightly improved today. No reported hemoptysis. No reported vomiting. Remains on heated HFNC. No acute events reported.     History taken from: patient, chart, and RN.      Objective     Vital Signs  Temp:  [97.5 °F (36.4 °C)-98.4 °F (36.9 °C)] 98 °F (36.7 °C)  Heart Rate:  [51-67] (P) 58  Resp:  [13-36] (P) 23  BP: (139-199)/() (P) 158/72    Intake/Output Summary (Last 24 hours) at 3/2/2025 1417  Last data filed at 3/2/2025 1412  Gross per 24 hour   Intake 612 ml   Output 900 ml   Net -288 ml         Physical Exam: Unchanged from previous.   General:    Awake, alert, in no acute distress, ill appearing   Heart:      Normal S1 and S2. Regular rate and rhythm. No significant murmur, rubs or gallops appreciated.   Lungs:     Respirations regular, even and unlabored. Diminished breath sounds throughout.    Abdomen:   Soft and nontender. No guarding, rebound tenderness or  organomegaly noted. Bowel sounds present x 4.   Extremities:  No clubbing, cyanosis or edema noted. Moves UE and LE equally B/L.     Results Review:    Results from last 7 days   Lab Units 03/02/25  0210 03/01/25 0101 02/28/25 0128 02/27/25  0058 02/26/25  0901   WBC 10*3/mm3 11.60* 11.60* 16.37* 17.35* 21.42*   HEMOGLOBIN g/dL 11.5* 10.4* 11.0* 12.6 13.3   PLATELETS 10*3/mm3 322 298 320 328 346     Results from last 7 days   Lab Units 03/02/25  0210 03/01/25  0101 02/28/25  0128 02/27/25  0058 02/26/25  0901   SODIUM mmol/L 145 143 144 141 141   POTASSIUM mmol/L 4.0 3.8 3.3* 3.3* 4.2   CHLORIDE mmol/L 109* 106 105 102 102   CO2 mmol/L 26.6 25.8 25.3 23.2 26.5   BUN mg/dL  51* 56* 40* 13 11   CREATININE mg/dL 0.92 1.19* 1.26* 0.84 0.79   CALCIUM mg/dL 9.3 8.9 9.2 9.1 9.3   GLUCOSE mg/dL 159* 183* 169* 182* 184*     Results from last 7 days   Lab Units 03/01/25  0101 02/27/25  0058 02/26/25  0901   BILIRUBIN mg/dL 0.2 0.5 0.5   ALK PHOS U/L 90 110 120*   AST (SGOT) U/L 23 38* 54*   ALT (SGPT) U/L 16 21 23     Results from last 7 days   Lab Units 03/01/25  0101 02/28/25  0128 02/27/25  0058   MAGNESIUM mg/dL 2.6* 2.3 2.1     Results from last 7 days   Lab Units 02/26/25  1843   INR  1.19*     Results from last 7 days   Lab Units 02/26/25  0956 02/26/25  0901   HSTROP T ng/L 33* 28*       Imaging Results (Last 24 Hours)       Procedure Component Value Units Date/Time    XR Chest 1 View [[id number]] Collected: 03/02/25 0617     Updated: 03/02/25 0620    Narrative:      PROCEDURE: Portable chest x-ray examination performed on March 1, 2025.  Single view.     HISTORY: NG tube placement.     COMPARISON: None.     FINDINGS:     Enlarged heart size.  Feeding tube in place with tip in the gastric lumen.  Coarsened bronchovascular pattern to the lungs with features of  multifocal pneumonia.  Mild hypoinflated lungs  No pleural effusion or pneumothorax.       Impression:         Enlarged heart size  Multifocal pneumonia.  Hypoinflated lungs  Feeding tube in place with tip in the gastric lumen.        This report was finalized on 3/2/2025 6:18 AM by Skyler Castellanos MD.                 Medications:  amLODIPine, 5 mg, Oral, Q24H   And  lisinopril, 40 mg, Oral, Q24H  aspirin, 81 mg, Oral, Daily  atenolol, 25 mg, Oral, Daily  atorvastatin, 10 mg, Oral, Daily  cetirizine, 10 mg, Oral, Daily  cholecalciferol, 50,000 Units, Oral, Weekly  cloNIDine, 0.2 mg, Oral, Q8H  donepezil, 10 mg, Oral, Nightly  DULoxetine, 60 mg, Oral, Daily  estradiol, 1 mg, Oral, Daily  FLUoxetine, 10 mg, Oral, Daily  insulin lispro, 2-7 Units, Subcutaneous, 4x Daily AC & at Bedtime  isosorbide mononitrate, 30 mg, Oral,  Daily  levothyroxine, 100 mcg, Oral, QAM AC  lubiprostone, 8 mcg, Oral, BID  melatonin, 10 mg, Oral, Nightly  methylPREDNISolone sodium succinate, 500 mg, Intravenous, Daily  nystatin, 1 Application, Topical, BID  pantoprazole, 40 mg, Oral, Q AM  piperacillin-tazobactam, 3.375 g, Intravenous, Q8H  potassium chloride, 40 mEq, Oral, Once  rOPINIRole, 0.5 mg, Oral, Daily  sodium chloride, 10 mL, Intravenous, Q12H  sodium chloride, 10 mL, Intravenous, Q12H  sodium chloride, 10 mL, Intravenous, Q12H                 Assessment & Plan   Acute on chronic hypoxic respiratory failure: Likely 2/2 exacerbation of pulmonary fibrosis with concern for DAH. Concern for possible pneumonia on CT with no evidence of PE. Cont high dose steroids. Intermittent diuresis as tolerated to assist with lung compliance. Completed course of azithromycin. Rocephin escalated to Zosyn per pulm. Currently on heated HFNC. Cont to monitor closely. Pulm input appreciated.     Essential HTN: BP has been elevated with home medications resumed on admission with PRN regimen added as well. Episode of hypotension on 2/27 but improved. Lisinopril and spironolactone held as Cr began trending upward. Cr improved and will resume lisinopril today. Cont to monitor.     Hyperglycemia: Steroids likely contributing. HgbA1c 6.1. Cont SSI with Accuchecks.     OMID: Cr improved today. Restart lisinopril. Cont to hold spironolactone. S/P IVF's per pulm. Monitor UOP and repeat labs in the AM.     Mild hypokalemia: K+ improved with supplementation and stable today. Mg is >2. Cont to monitor.     Concern for dysphagia: NPO with sips with meds pending additional SLP eval. Unable to go for MBS at present 2/2 O2 requirements. NG placed with initiation of TF's.      HLD: Cont statin.     Hypothyroidism: Cont levothyroxine.     GERD: Cont PPI.     DVT PPX: SCD's      Disposition Pending clinical course. Will likely require several days of hospitalization in the setting of  increased O2 requirements.     Lito Branch,   03/02/25  14:17 EST

## 2025-03-02 NOTE — PLAN OF CARE
Goal Outcome Evaluation:              Pt remains on heated high flow. Tube feed rate increased to 30 ml/hr. Care ongoing.

## 2025-03-03 NOTE — PLAN OF CARE
Problem: Skin Injury Risk Increased  Goal: Skin Health and Integrity  Outcome: Progressing     Problem: Fall Injury Risk  Goal: Absence of Fall and Fall-Related Injury  Outcome: Progressing  Intervention: Identify and Manage Contributors  Recent Flowsheet Documentation  Taken 3/3/2025 0300 by Rose Cohen RN  Medication Review/Management: medications reviewed  Taken 3/3/2025 0100 by Rose Cohen RN  Medication Review/Management: medications reviewed  Taken 3/2/2025 2300 by Rose Cohen RN  Medication Review/Management: medications reviewed  Taken 3/2/2025 2100 by Rose Cohen RN  Medication Review/Management: medications reviewed  Taken 3/2/2025 1900 by Rose Cohen RN  Medication Review/Management: medications reviewed  Intervention: Promote Injury-Free Environment  Recent Flowsheet Documentation  Taken 3/3/2025 0300 by Rose Cohen RN  Safety Promotion/Fall Prevention: safety round/check completed  Taken 3/3/2025 0100 by Rose Cohen RN  Safety Promotion/Fall Prevention: safety round/check completed  Taken 3/2/2025 2300 by Rose Cohen RN  Safety Promotion/Fall Prevention: safety round/check completed  Taken 3/2/2025 2100 by Rose Cohen RN  Safety Promotion/Fall Prevention: safety round/check completed  Taken 3/2/2025 1900 by Rose Cohen RN  Safety Promotion/Fall Prevention: safety round/check completed     Problem: Sepsis/Septic Shock  Goal: Optimal Coping  Outcome: Progressing  Intervention: Support Patient and Family Response  Recent Flowsheet Documentation  Taken 3/3/2025 0200 by Rose Cohen RN  Family/Support System Care: support provided  Taken 3/2/2025 2000 by Rose Cohen RN  Supportive Measures: active listening utilized  Family/Support System Care: support provided  Goal: Absence of Bleeding  Outcome: Progressing  Goal: Blood Glucose Level Within Target Range  Outcome: Progressing  Goal: Absence of Infection Signs and Symptoms  Outcome: Progressing  Intervention: Initiate Sepsis  Management  Recent Flowsheet Documentation  Taken 3/3/2025 0300 by Rose Cohen RN  Infection Prevention: single patient room provided  Taken 3/3/2025 0100 by Rose Cohen RN  Infection Prevention: single patient room provided  Taken 3/2/2025 2300 by Rose Cohen RN  Infection Prevention: single patient room provided  Taken 3/2/2025 2100 by Rose Cohen RN  Infection Prevention: single patient room provided  Taken 3/2/2025 1900 by Rose Cohen RN  Infection Prevention: single patient room provided  Goal: Optimal Nutrition Delivery  Outcome: Progressing     Problem: Adult Inpatient Plan of Care  Goal: Plan of Care Review  Outcome: Progressing  Flowsheets (Taken 3/3/2025 0311)  Progress: no change  Plan of Care Reviewed With: patient  Goal: Patient-Specific Goal (Individualized)  Outcome: Progressing  Goal: Absence of Hospital-Acquired Illness or Injury  Outcome: Progressing  Intervention: Identify and Manage Fall Risk  Recent Flowsheet Documentation  Taken 3/3/2025 0300 by Rose Cohen RN  Safety Promotion/Fall Prevention: safety round/check completed  Taken 3/3/2025 0100 by Rose Cohen RN  Safety Promotion/Fall Prevention: safety round/check completed  Taken 3/2/2025 2300 by Rose Cohen RN  Safety Promotion/Fall Prevention: safety round/check completed  Taken 3/2/2025 2100 by Rose Cohen RN  Safety Promotion/Fall Prevention: safety round/check completed  Taken 3/2/2025 1900 by Rose Cohen RN  Safety Promotion/Fall Prevention: safety round/check completed  Intervention: Prevent and Manage VTE (Venous Thromboembolism) Risk  Recent Flowsheet Documentation  Taken 3/3/2025 0200 by Rose Cohen RN  VTE Prevention/Management:   bilateral   SCDs (sequential compression devices) on  Taken 3/2/2025 2000 by Rose Cohen RN  VTE Prevention/Management:   bilateral   SCDs (sequential compression devices) on  Intervention: Prevent Infection  Recent Flowsheet Documentation  Taken 3/3/2025 0300 by Rose Cohen  RN  Infection Prevention: single patient room provided  Taken 3/3/2025 0100 by Rose Cohen RN  Infection Prevention: single patient room provided  Taken 3/2/2025 2300 by Rose Cohen RN  Infection Prevention: single patient room provided  Taken 3/2/2025 2100 by Rose Cohen RN  Infection Prevention: single patient room provided  Taken 3/2/2025 1900 by Rose Cohen RN  Infection Prevention: single patient room provided  Goal: Optimal Comfort and Wellbeing  Outcome: Progressing  Intervention: Provide Person-Centered Care  Recent Flowsheet Documentation  Taken 3/3/2025 0200 by Rose Cohen RN  Trust Relationship/Rapport:   care explained   choices provided   thoughts/feelings acknowledged  Taken 3/2/2025 2000 by Rose Cohen RN  Trust Relationship/Rapport:   care explained   choices provided   thoughts/feelings acknowledged  Goal: Readiness for Transition of Care  Outcome: Progressing  Goal: Plan of Care Review  Outcome: Progressing  Flowsheets (Taken 3/3/2025 0311)  Progress: no change  Plan of Care Reviewed With: patient  Goal: Patient-Specific Goal (Individualized)  Outcome: Progressing  Goal: Absence of Hospital-Acquired Illness or Injury  Outcome: Progressing  Intervention: Identify and Manage Fall Risk  Recent Flowsheet Documentation  Taken 3/3/2025 0300 by Rose Cohen RN  Safety Promotion/Fall Prevention: safety round/check completed  Taken 3/3/2025 0100 by Rose Cohen RN  Safety Promotion/Fall Prevention: safety round/check completed  Taken 3/2/2025 2300 by Rose Cohen RN  Safety Promotion/Fall Prevention: safety round/check completed  Taken 3/2/2025 2100 by Rose Cohen RN  Safety Promotion/Fall Prevention: safety round/check completed  Taken 3/2/2025 1900 by Rose Cohen RN  Safety Promotion/Fall Prevention: safety round/check completed  Intervention: Prevent and Manage VTE (Venous Thromboembolism) Risk  Recent Flowsheet Documentation  Taken 3/3/2025 0200 by Rose Cohen RN  VTE  Prevention/Management:   bilateral   SCDs (sequential compression devices) on  Taken 3/2/2025 2000 by Rose Cohen RN  VTE Prevention/Management:   bilateral   SCDs (sequential compression devices) on  Intervention: Prevent Infection  Recent Flowsheet Documentation  Taken 3/3/2025 0300 by Rose Cohen RN  Infection Prevention: single patient room provided  Taken 3/3/2025 0100 by Rose Cohen RN  Infection Prevention: single patient room provided  Taken 3/2/2025 2300 by Rose Cohen RN  Infection Prevention: single patient room provided  Taken 3/2/2025 2100 by Rose Cohen RN  Infection Prevention: single patient room provided  Taken 3/2/2025 1900 by Rose Cohen RN  Infection Prevention: single patient room provided  Goal: Optimal Comfort and Wellbeing  Outcome: Progressing  Intervention: Provide Person-Centered Care  Recent Flowsheet Documentation  Taken 3/3/2025 0200 by Rose Cohen RN  Trust Relationship/Rapport:   care explained   choices provided   thoughts/feelings acknowledged  Taken 3/2/2025 2000 by Rose Cohen RN  Trust Relationship/Rapport:   care explained   choices provided   thoughts/feelings acknowledged  Goal: Readiness for Transition of Care  Outcome: Progressing   Goal Outcome Evaluation:  Plan of Care Reviewed With: patient        Progress: no change

## 2025-03-03 NOTE — CASE MANAGEMENT/SOCIAL WORK
Discharge Planning Assessment  [facility]     Patient Name: Gregoria Pendleton  MRN: [id number]  Today's Date: 3/3/2025    Admit Date: 2/26/2025     Discharge Plan       Row Name 03/03/25 1136       Plan    Plan Pt remains on heated; high flow at 60 liters. Pt not medically stable at this time. Pt lives at [address] [place] [address]) with ex-spouse Jarocho and plans to return back home at discharge. Pt does not utilize  services at this time. Pt has rollator, (s) cane, BSC, wheelchair via family and home o2 at 2 liters via susy-rite. Pt has no POA or living will. Pt voiced she utilizes her rollator and cane when ambulating at home. Pt voiced her ex-spouse assist with her care at home. SS to follow and assist with discharge planning.               LILIAM PrajapatiW

## 2025-03-03 NOTE — PLAN OF CARE
Goal Outcome Evaluation:  Plan of Care Reviewed With: patient, spouse        Progress: no change  Outcome Evaluation: Patient still on heated high flow,60L 73%, swallow eval completed, meds with applesauce, HTN at times,Dr Lorenzana discussed ongoing plan of care, family at bedside.

## 2025-03-03 NOTE — PROGRESS NOTES
Progress Note Pulmonary      Subjective complaining of shortness of breath.  Required high flow oxygen at 60 L/min and 62%.     at bedside and he reported that she has been sick since she went camping in November 2024.        Interval History:     As described    Review of Systems:    Denies chest pain, orthopnea, PND, leg edema, nausea, vomiting, diarrhea, hemoptysis, hematemesis, melena, bright red blood per rectum    Vital Signs  Temp:  [97.7 °F (36.5 °C)-99.4 °F (37.4 °C)] 97.7 °F (36.5 °C)  Heart Rate:  [49-74] 60  Resp:  [9-31] 25  BP: (135-201)/(61-85) 178/78  Body mass index is 29.72 kg/m².    Intake/Output Summary (Last 24 hours) at 3/3/2025 1339  Last data filed at 3/3/2025 1200  Gross per 24 hour   Intake 1424.03 ml   Output 1025 ml   Net 399.03 ml     I/O this shift:  In: 64 [IV Piggyback:64]  Out: -     Physical Exam:  General- normal in appearance, not in any acute distress    HEENT- pupils equally reactive to light, normal in size, no scleral icterus    Neck- supple    No JVD, no carotid bruit    Respiratory-bilateral air entry, basal rales, no wheezing.    Cardiovascular-  Normal S1 and S2. No S3, S4 or murmurs.    GI-nontender nondistended bowel sounds positive    CNS-alert oriented x3, grossly nonfocal    Extremities- pulses normal bilaterally , no clubbing and edema        Results Review:      Results from last 7 days   Lab Units 03/03/25  0114 03/02/25  0210 03/01/25  0101   WBC 10*3/mm3 13.11* 11.60* 11.60*   HEMOGLOBIN g/dL 11.2* 11.5* 10.4*   PLATELETS 10*3/mm3 328 322 298     Results from last 7 days   Lab Units 03/03/25  0114 03/02/25  0210 03/01/25  0101 02/28/25  0128 02/27/25  0058   SODIUM mmol/L 142 145 143 144 141   POTASSIUM mmol/L 3.9 4.0 3.8 3.3* 3.3*   CHLORIDE mmol/L 105 109* 106 105 102   CO2 mmol/L 28.4 26.6 25.8 25.3 23.2   BUN mg/dL 50* 51* 56* 40* 13   CREATININE mg/dL 0.95 0.92 1.19* 1.26* 0.84   CALCIUM mg/dL 9.0 9.3 8.9 9.2 9.1   GLUCOSE mg/dL 248* 159* 183* 169*  182*   MAGNESIUM mg/dL  --   --  2.6* 2.3 2.1     Lab Results   Component Value Date    INR 1.19 (H) 02/26/2025    INR 0.95 12/26/2024    PROTIME 15.2 (H) 02/26/2025    PROTIME 10.3 12/26/2024     Results from last 7 days   Lab Units 03/03/25  0114 03/01/25  0101 02/27/25  0058   ALK PHOS U/L 100 90 110   BILIRUBIN mg/dL 0.4 0.2 0.5   ALT (SGPT) U/L 28 16 21   AST (SGOT) U/L 32 23 38*     Results from last 7 days   Lab Units 02/26/25  0839   PH, ARTERIAL pH units 7.450   PO2 ART mm Hg 77.1*   PCO2, ARTERIAL mm Hg 40.2   HCO3 ART mmol/L 27.9*     Imaging Results (Last 24 Hours)       Procedure Component Value Units Date/Time    XR Chest 1 View [[id number]] Collected: 03/03/25 0215     Updated: 03/03/25 0218    Narrative:      PROCEDURE: Chest x-ray examination performed on March 2, 2025. Single  view.     HISTORY: Feeding tube placement. Confirm position.     COMPARISON: None.     FINDINGS:     Enlarged heart size  Central pulmonary vascular congestion.  Edema.  Confluent and patchy airspace opacities in the lungs consistent with  multifocal pneumonia.  Mild elevated right hemidiaphragm.  Feeding tube in place with tip in the gastric lumen.  No free air in the upper abdomen.       Impression:         Enlarged heart size  Central pulmonary vascular congestion.  Multifocal pneumonia.  Feeding tube in place with tip in the gastric lumen.        This report was finalized on 3/3/2025 2:16 AM by Skyler Castellanos MD.                    amLODIPine, 5 mg, Oral, Q24H   And  lisinopril, 40 mg, Oral, Q24H  aspirin, 81 mg, Oral, Daily  atenolol, 25 mg, Oral, Daily  atorvastatin, 10 mg, Oral, Daily  cetirizine, 10 mg, Oral, Daily  cholecalciferol, 50,000 Units, Oral, Weekly  cloNIDine, 0.2 mg, Oral, Q8H  donepezil, 10 mg, Oral, Nightly  DULoxetine, 60 mg, Oral, Daily  estradiol, 1 mg, Oral, Daily  FLUoxetine, 10 mg, Oral, Daily  insulin lispro, 2-7 Units, Subcutaneous, 4x Daily AC & at Bedtime  isosorbide mononitrate, 30 mg, Oral,  Daily  levothyroxine, 100 mcg, Oral, QAM AC  lubiprostone, 8 mcg, Oral, BID  melatonin, 10 mg, Oral, Nightly  nystatin, 1 Application, Topical, BID  pantoprazole, 40 mg, Oral, Q AM  piperacillin-tazobactam, 3.375 g, Intravenous, Q8H  potassium chloride, 40 mEq, Oral, Once  rOPINIRole, 0.5 mg, Oral, Daily  sodium chloride, 10 mL, Intravenous, Q12H  sodium chloride, 10 mL, Intravenous, Q12H  sodium chloride, 10 mL, Intravenous, Q12H           Medication Review:     Assessment & Plan     #1: Acute on chronic hypoxic respiratory failure-likely due to flareup of her interstitial lung disease.   diffuse alveolar hemorrhage.     Continue Solu-Medrol 500 mg IV for another 3 days.  Patient does have history of going to camping and got sick.  I am going to send Lyme titer as well as tick related screening.  Add doxycycline.  Pro-Brooks is within normal range.    Continue Zosyn.    If respiratory status gets worse can start on BiPAP with settings 20 x 10  Respiratory rate 20  Adjust FiO2 to maintain saturation 92 to 95%           Cardiology- hemodynamically -stable.  Continue to monitor HR- rate and rhythm, BP      Nephrology-OMID-creatinine better.  Continue to monitor.  Hold lisinopril and discontinued spironolactone  I/O-reviewed     GI-if improves can start on a diet     Hematology- CBC  Hb  platelet  WBC        Endrocrinology- Maintain Blood sugar 140 -180        Electrolytes-   Mag and phos         DVT prophylaxis-  Continue     I have personally reviewed x-rays, labs, medication list.    Total time spent 35      Tacho Blackwell MD  03/03/25  13:39 EST

## 2025-03-03 NOTE — MBS/VFSS/FEES
"Acute Care - Speech Language Pathology   Swallow Initial Evaluation  [facility]  MODIFIED BARIUM SWALLOW STUDY     Patient Name: Gregoria Pendleton  : 1945  MRN: [id number]  Today's Date: 3/3/2025  Onset of Illness/Injury or Date of Surgery: 25     Referring Physician: Sarina      Admit Date: 2025    Gregoria Pendleton  presented to the radiology suite this pm from [address] to participate in an instrumental MBS to objectively evaluate safety/efficacy of swallowing fnx, determine safest/least restrictive diet. She has a PMH significant for  pulmonary fibrosis, Hx CVA, GERD, dysphagia, thyroid disease, HTN, HLD, hx CVA approximately 2 years ago, and dysphagia w/ esophageal dilation required.      Per EMR review, \"She and spouse report she has been increasingly more short of breath for about the past week. She is having increased cough but denies much sputum production. She does wear 2L supplemental O2 at baseline. She has not had any fevers, chills. No known sick contacts. She does complain of rhinorrhea. No abdominal pain. She complains of constipation.   She has history of dysphagia- needs to have esophagus stretched periodically. For the past few weeks she has had more difficulty swallowing. Does not eat a modified diet at baseline, has occasional difficulty swallowing pills. Last night while eating dinner she \"strangled\" but after clearing her O2 sats normalized at home. She went on to bed and upon waking this morning they found her O2 saturation to be 33% on home pulse ox. Her  noted her to have some hemoptysis this morning as well and she did have one episode of vomiting. She denies recent epistaxis\".      Upon arrival to the ED, vital signs were temp 97.4, heart rate 107, respirations 35, SpO2 44% on nonrebreather, /78.  ABG obtained on 100% FiO2 nonrebreather showed pH normal, pCO2 normal, pO2 77.1.  Her initial HS troponin was 28 with repeat at 33, proBNP is 3228.  White blood cell " "count is elevated at 21.42 with D-dimer 1.73, lactate 3.4 and Pro-Brooks normal.  CT PE protocol negative for PE-did note diffuse bilateral airspace pneumonia-dense consolidation concerning for alveolar hemorrhage.      She was unfamiliar to SLP department of [facility] prior to this consult. No SLP notes were available for review from outside facilities at this time per EMR review. She is currently NPO w/ sips permitted w/ meds.      She is s/p clinical dysphagia assessment on 2/27 at which time she was evidenced w/ the following: \"...a generally weak overall oropharyngeal swallow w/ overt s/s concerning for aspiration demonstrated intermittently w/ thin water. Silent aspiration is unable to be definitively ruled out via this subjective assessment. Given findings from this assessment, leukocytosis, hx of dysphagia, hx CVA, recent choking event, and current chest imaging, she is felt to most benefit from instrumental MBS to r/o aspiration prior to po diet recommendations\".     She has continued NPO w/ ice chips/water provided. MBS was unable to be performed 2/27 or 2/28 per O2 requirements.         Social History     Socioeconomic History    Marital status:    Tobacco Use    Smoking status: Never     Passive exposure: Never    Smokeless tobacco: Never   Vaping Use    Vaping status: Never Used   Substance and Sexual Activity    Alcohol use: Never    Drug use: Never    Sexual activity: Defer      Imaging:   XR Chest 1 View [[id number]] Josh as Reviewed   Order Status: Completed Collected: 03/03/25 0215    Updated: 03/03/25 0218   Narrative:     PROCEDURE: Chest x-ray examination performed on March 2, 2025. Single  view.     HISTORY: Feeding tube placement. Confirm position.     COMPARISON: None.     FINDINGS:     Enlarged heart size  Central pulmonary vascular congestion.  Edema.  Confluent and patchy airspace opacities in the lungs consistent with  multifocal pneumonia.  Mild elevated right hemidiaphragm.  Feeding tube " in place with tip in the gastric lumen.  No free air in the upper abdomen.      Impression:        Enlarged heart size  Central pulmonary vascular congestion.  Multifocal pneumonia.  Feeding tube in place with tip in the gastric lumen.        This report was finalized on 3/3/2025 2:16 AM by Skyler Castellanos MD.       XR Chest 1 View [[id number]] Josh as Reviewed   Order Status: Completed Collected: 03/02/25 0617    Updated: 03/02/25 0620   Narrative:     PROCEDURE: Portable chest x-ray examination performed on March 1, 2025.  Single view.     HISTORY: NG tube placement.     COMPARISON: None.     FINDINGS:     Enlarged heart size.  Feeding tube in place with tip in the gastric lumen.  Coarsened bronchovascular pattern to the lungs with features of  multifocal pneumonia.  Mild hypoinflated lungs  No pleural effusion or pneumothorax.      Impression:        Enlarged heart size  Multifocal pneumonia.  Hypoinflated lungs  Feeding tube in place with tip in the gastric lumen.        This report was finalized on 3/2/2025 6:18 AM by Skyler Castellanos MD.       XR Chest 1 View [[id number]] Josh as Reviewed   Order Status: Completed Collected: 03/01/25 1209    Updated: 03/01/25 1213   Narrative:      VERIFICATION OBSERVER NAME: Hermes Becker MD.     TECHNIQUE, ADDITIONAL HISTORY, FINDINGS: Single frontal view of the  chest was obtained.  Comparison study date : Same day. Time : 10:45 AM.       Placement of Dobbhoff catheter with the distal end in the stomach.  Cardiac enlargement.  Diffuse interstitial changes similar to prior.                 [employer] [id number], Zip code [id number].           This report was finalized on 3/1/2025 12:11 PM by Dr. Hermes Becker MD.       XR Chest 1 View [[id number]] Josh as Reviewed   Order Status: Completed Collected: 03/01/25 0752    Updated: 03/01/25 0755   Narrative:     PROCEDURE: Portable chest x-ray examination performed on March 1, 2025.  Single view.     HISTORY: NG tube placement. Confirm position.      COMPARISON: None.     FINDINGS:     Enlarged heart size  Enteric drain in place with tip to the stomach.  Hypoinflated lungs.  Patchy airspace opacities in the upper and lower lobes consistent with  multifocal pneumonia.  No pleural effusion or pneumothorax  No fracture or foreign body.  No free air in the upper abdomen.      Impression:        Multifocal pneumonia.  Enlarged heart size  Hypoinflated lungs  Enteric drain in place with tip to the stomach.     This report was finalized on 3/1/2025 7:53 AM by Skyler Castellanos MD.       XR Chest 1 View [[id number]] Josh as Reviewed   Order Status: Completed Collected: 02/28/25 1745    Updated: 02/28/25 1749   Narrative:     Comparison: February 28, 2025 3:11 PM.     Nasogastric tube tip projects at anatomic region of the distal stomach  with side port projecting at the stomach. Low lung volumes limits  evaluation. Bilateral, diffuse airspace disease is noted. No  pneumothorax or pleural effusion identified.      Impression:     Impression:  1. Status post nasogastric tube placement.     This report was finalized on 2/28/2025 5:47 PM by Ryan Soares DO.       XR Chest 1 View [[id number]] Josh as Reviewed   Order Status: Completed Collected: 02/28/25 1545    Updated: 02/28/25 1547   Narrative:     EXAM:    XR Chest, 1 View     EXAM DATE:    2/28/2025 3:45 PM     CLINICAL HISTORY:    Hypoxia; J84.10-Pulmonary fibrosis, unspecified; J69.0-Pneumonitis due  to inhalation of food and vomit     TECHNIQUE:    Frontal view of the chest.     COMPARISON:    2/26/2025     FINDINGS:    Lungs and pleural spaces:  Again patchy bilateral airspace disease  grossly stable.  No consolidation.  No pneumothorax.    Heart:  Unremarkable as visualized.  No cardiomegaly.    Mediastinum:  Unremarkable as visualized.  Normal mediastinal contour.    Bones/joints:  Unremarkable as visualized.  No acute fracture.      Impression:       Again patchy bilateral airspace disease grossly stable.      This report was finalized on 2/28/2025 3:45 PM by Dr. Theodore Bautista MD.     Labs:   Latest Reference Range & Units 02/28/25 01:28 03/01/25 01:01 03/02/25 02:10 03/03/25 01:14   WBC 3.40 - 10.80 10*3/mm3 16.37 (H) 11.60 (H) 11.60 (H) 13.11 (H)   RBC 3.77 - 5.28 10*6/mm3 3.73 (L) 3.59 (L) 3.96 3.85   Hemoglobin 12.0 - 15.9 g/dL 11.0 (L) 10.4 (L) 11.5 (L) 11.2 (L)   Hematocrit 34.0 - 46.6 % 34.4 33.4 (L) 36.5 36.2   Platelets 140 - 450 10*3/mm3 320 298 322 328   RDW 12.3 - 15.4 % 14.8 14.4 13.9 14.0   MCV 79.0 - 97.0 fL 92.2 93.0 92.2 94.0   MCH 26.6 - 33.0 pg 29.5 29.0 29.0 29.1   MCHC 31.5 - 35.7 g/dL 32.0 31.1 (L) 31.5 30.9 (L)   MPV 6.0 - 12.0 fL 10.6 10.2 10.5 10.4   RDW-SD 37.0 - 54.0 fl 49.9 49.7 47.1 47.8   (H): Data is abnormally high  (L): Data is abnormally low  Diet Orders (active) (From admission, onward)       Start     Ordered    03/03/25 1456  Diet: Regular/House; Feeding Assistance - Nursing; Texture: Pureed (NDD 1); Fluid Consistency: Thin (IDDSI 0)  Diet Effective Now        Comments: Assist w/ feeding.   Defer po intake when not fully a/a   Upright for all po intake.    03/03/25 1457    03/01/25 0925  Tube Feeding: Formula: Other; : Diabetasource; Feeding Type: Continuous; Start at: 20 mL/hr; Then Advance By: 10 mL/hr; Every: 6 hours; To Goal Rate of: 50 mL/hr; Water Flush: 20 mL; Every: 1 hour; Water Bolus: None  Diet Effective Now         03/01/25 0924                  Ms Pendleton is observed on non-rebreather face mask w/o complications noted. RN accompanies her for this evaluation.     Ms Pendleton reports apprehension of this evaluation and is noted w/ increased work of breathing as well as significantly limited endurance and fatigue across this evaluation.     Risks and benefits of the procedure were explained w/ patient verbalizing understanding/agreement to participate. Proceed per protocol.     She was positioned upright and centered in a soft strap supportive chair to accept multiple po  presentations of puree, honey thick, nectar thick, and thin liquids via spoon and straw, along w/ whole placebo pill in puree. She did not self provide po trials.      All views are from the lateral plane.     Facial/oral structures were symmetrical upon observation w/o lingual deviation upon protrusion. Oral mucosa were moist, pink and clean. Secretions were clear, thin, and well controlled. OROM/RICARDO was wfl to imitate oral postures. Gag was not assessed. Volitional cough was adequate in intensity, clear in quality, nonproductive. Vocal quality was weak in intensity, clear in quality w/ intelligible speech. She was a/a and cooperative to participate, oriented to person, place, and time, follows simple directives, and participates in simple conversational exchanges.     Upon po presentations, adequate bolus anticipation w/ good labial seal for bolus clearance via spoon bowl and suction via straw.  Bolus formation, manipulation, and control were mildly prolonged. A-p transit was generally timely w/o significant oral residue. Tongue base retraction and linguavelar seal were adequate w/o premature spillage. No laryngeal penetration or aspiration evidenced before the swallow.     Pharyngeal swallow was timely w/ adequate hyolaryngeal elevation and epiglottic inversion. Pharyngeal contraction was adequate w/o significant residue. No laryngeal penetration or aspiration evidenced during or after the swallow.     Full esophageal sweep reveals no mucosal abnormalities. Motility is decreased w/o retrograde flow noted.     She was evidenced w/ cough response following all po presentations. No aspiration was evidenced.      Visit Dx:     ICD-10-CM ICD-9-CM   1. Pulmonary fibrosis  J84.10 515   2. Aspiration pneumonia of both lungs, unspecified aspiration pneumonia type, unspecified part of lung  J69.0 507.0     Patient Active Problem List   Diagnosis    Pulmonary fibrosis    Essential hypertension    Cryptogenic stroke     Esophageal dysphagia    Gastroesophageal reflux disease    Acute hypoxic respiratory failure     Past Medical History:   Diagnosis Date    Arthritis     Disease of thyroid gland     Elevated cholesterol     GERD (gastroesophageal reflux disease)     Hyperlipidemia     Hypertension     Migraine     Pulmonary fibrosis     Stroke      Past Surgical History:   Procedure Laterality Date    CYSTOSCOPY W/ LASER LITHOTRIPSY  01/15/2024    ENDOSCOPY N/A 12/12/2024    Procedure: ESOPHAGOGASTRODUODENOSCOPY WITH BIOPSY;  Surgeon: Jaky Goddard MD;  Location: [facility];  Service: Gastroenterology;  Laterality: N/A;  dilated to 20mm    HYSTERECTOMY  age 38    TUBAL ABDOMINAL LIGATION Bilateral      Impression:     Ms Pendleton presented w/ profoundly limited endurance and increased fatigue across this evaluation. She additionally was noted w/ increased work of breathing across po presentations and cough response elicited following all po presentations. Oropharyngeal swallow was negatively impacted by these, however no aspiration was evidenced across this evaluation. Per limited endurance and fatigue, solid textures were deferred. She additionally was evidenced w/ decrease esophageal motility.     Per this evaluation, Ms Pendleton is felt to most benefit from po diet initiation of a modified po diet of puree textures and thin liquids w/ medications presented whole in puree/thins, single pill presentations. She is recommended for fully upright positioning, fully a/a for all po intake, and feeding assistance to be provided per limited endurance/fatigue.    SLP Recommendation and Plan     1. Puree textures, thin liquids.   2. Medications whole in puree/thins. Single pill presentations.   3. JACK precautions.   4. Oral care protocol.   5. Fully upright for all po intake.   6. Fully a/a for all po intake.   7. Feeding assistance for all po intake    SLP to f/u for diet safety/tolerance/acceptance.     D/w patient results and  recommendations w/ verbal understanding and agreement.     D/w RN results and recommendations w/ verbal understanding and agreement.     Thank you for allowing me to participate in the care of your patient-  Danni Fischer M.S., CCC-SLP        EDUCATION  The patient has been educated in the following areas:   Dysphagia (Swallowing Impairment) Oral Care/Hydration Modified Diet Instruction.        Time Calculation:    Time Calculation- SLP       Row Name 03/03/25 1459             Time Calculation- SLP    SLP - Next Appointment 03/04/25  -                User Key  (r) = Recorded By, (t) = Taken By, (c) = Cosigned By      Initials Name Provider Type    Danni Chávez MS CCC-SLP Speech and Language Pathologist                    Therapy Charges for Today       Code Description Service Date Service Provider Modifiers Qty    [id number] HC ST MOTION FLUORO EVAL SWALLOW 8 3/3/2025 Danni Fischer MS CCC-SLP GN 1                 Danni Fischer MS CCC-NARCISA  3/3/2025

## 2025-03-04 NOTE — PROGRESS NOTES
Adult Nutrition  Assessment/PES    Patient Name:  Gregoria Pendleton  YOB: 1945  MRN: [id number]  Admit Date:  2/26/2025    Assessment Date:  3/3/2025    Comments:  follow up po    TF stopped today per RN    S/p SLP eval for po    Noted resp status. Expect limited po.    Will add boost glucose 3 per day to provide an easy source of nutrition.    Will cont to follow and monitor.      Reason for Assessment       Row Name 03/03/25 2109          Reason for Assessment    Reason For Assessment follow-up protocol  [place], [place]     Diagnosis pulmonary disease  acute on chronic resp failure, possible ILD, HHF 60 L                    Nutrition/Diet History       Row Name 03/03/25 2110          Nutrition/Diet History    Typical Intake (Food/Fluid/EN/PN) pt passed swallow eval today, po only per RN tonight                    Labs/Tests/Procedures/Meds       Row Name 03/03/25 2111          Labs/Procedures/Meds    Lab Results Reviewed reviewed     Lab Results Comments glu [id number]        Diagnostic Tests/Procedures    Diagnostic Test/Procedure Reviewed reviewed        Medications    Pertinent Medications Reviewed reviewed     Pertinent Medications Comments kcl, melatonin, humalog, vitamin D                        Nutrition Prescription Ordered       Row Name 03/03/25 2111          Nutrition Prescription PO    Current PO Diet Pureed     Fluid Consistency Thin                    Evaluation of Received Nutrient/Fluid Intake       Row Name 03/03/25 2112          Fluid Intake Evaluation    Oral Fluid (mL) 200        PO Evaluation    Number of Days PO Intake Evaluated Insufficient Data        EN Evaluation    TF Changes Discontinued                       Problem/Interventions:   Problem 1       Row Name 03/03/25 2112          Nutrition Diagnoses Problem 1    Problem 1 Other (comment)  Increased nutrient needs related to increased demand for healing as evidenced by resp failure                           Intervention Goal       Row Name 03/03/25 2113          Intervention Goal    General Meet nutritional needs for age/condition     PO Establish PO;PO intake (%)     PO Intake % 50 %                    Nutrition Intervention       Row Name 03/03/25 2114          Nutrition Intervention    RD/Tech Action Encourage intake;Follow Tx progress;Recommend/ordered     Recommended/Ordered Supplement                    Nutrition Prescription       Row Name 03/03/25 2114          Nutrition Prescription PO    PO Prescription Begin/change supplement     Supplement Boost GLucose     Supplement Frequency 3 times a day                    Education/Evaluation       Row Name 03/03/25 2114          Education    Education Education not appropriate at this time        Monitor/Evaluation    Monitor Per protocol;I&O;PO intake;Supplement intake;Pertinent labs;Weight                     Electronically signed by:  Tiny Shin RD  03/03/25 21:15 EST

## 2025-03-04 NOTE — PROGRESS NOTES
[facility] HOSPITALIST PROGRESS NOTE     Patient Identification:  Name:  Gregoria Pendleton  Age:  79 y.o.  Sex:  female  :  1945  MRN:  [id number]  Visit Number:  [id number]  Primary Care Provider:  Marisa Arias APRN    Length of stay:  5    Chief complaint: Shortness of breath    Subjective:    Patient seen and examined at bedside with patient's family present.  Patient continues to report shortness of breath which is unchanged in comparison to yesterday.  Patient did have a swallow evaluation performed today which she did pass.  Unfortunately, shortly thereafter she became very tachypneic with increased work of breathing.  Did have lengthy conversation with patient regarding suspected underlying interstitial lung disease/pulmonary fibrosis.  Did discuss using continued high-dose steroid therapy and did discuss the need to begin conversations regarding the what the next steps are if recurrent ILD exacerbation cannot be slow down/reversed.  ----------------------------------------------------------------------------------------------------------------------  Current Hospital Meds:  amLODIPine, 5 mg, Oral, Q24H   And  lisinopril, 40 mg, Oral, Q24H  aspirin, 81 mg, Oral, Daily  atenolol, 25 mg, Oral, Daily  atorvastatin, 10 mg, Oral, Daily  cetirizine, 10 mg, Oral, Daily  cholecalciferol, 50,000 Units, Oral, Weekly  cloNIDine, 0.2 mg, Oral, Q8H  donepezil, 10 mg, Oral, Nightly  doxycycline, 100 mg, Intravenous, Q12H  DULoxetine, 60 mg, Oral, Daily  estradiol, 1 mg, Oral, Daily  FLUoxetine, 10 mg, Oral, Daily  insulin lispro, 2-7 Units, Subcutaneous, 4x Daily AC & at Bedtime  isosorbide mononitrate, 30 mg, Oral, Daily  levothyroxine, 100 mcg, Oral, QAM AC  lubiprostone, 8 mcg, Oral, BID  melatonin, 10 mg, Oral, Nightly  nystatin, 1 Application, Topical, BID  pantoprazole, 40 mg, Oral, Q AM  piperacillin-tazobactam, 3.375 g, Intravenous, Q8H  potassium chloride, 40 mEq, Oral,  Once  rOPINIRole, 0.5 mg, Oral, Daily  sodium chloride, 10 mL, Intravenous, Q12H  sodium chloride, 10 mL, Intravenous, Q12H  sodium chloride, 10 mL, Intravenous, Q12H         ----------------------------------------------------------------------------------------------------------------------  Vital Signs:  Temp:  [97.7 °F (36.5 °C)-98.6 °F (37 °C)] 98.2 °F (36.8 °C)  Heart Rate:  [] 55  Resp:  [10-45] 19  BP: (134-201)/(63-87) 152/68      03/01/25  0400 03/02/25  0400 03/03/25  0400   Weight: 74.5 kg (164 lb 3.9 oz) 73.9 kg (162 lb 14.7 oz) 73.7 kg (162 lb 7.7 oz)     Body mass index is 29.72 kg/m².    Intake/Output Summary (Last 24 hours) at 3/3/2025 2004  Last data filed at 3/3/2025 1700  Gross per 24 hour   Intake 730.03 ml   Output 675 ml   Net 55.03 ml     Diet: Regular/House; Feeding Assistance - Nursing; Texture: Pureed (NDD 1); Fluid Consistency: Thin (IDDSI 0)  ----------------------------------------------------------------------------------------------------------------------  Physical exam:  Constitutional: Acutely ill-appearing  female in mild to moderate distress.     HENT:  Head:  Normocephalic and atraumatic.  Mouth:  Moist mucous membranes.    Eyes:  Conjunctivae and EOM are normal.  Pupils are equal, round, and reactive to light.  No scleral icterus.    Neck:  Neck supple. No thyromegaly.  No JVD present.    Cardiovascular:  Regular rate and rhythm with no murmurs, rubs, clicks or gallops appreciated.  Pulmonary/Chest: Tachypneic with respiratory rate in the 30s with decreased air entry and diffuse crackles on inspiration.  Abdominal:  Soft. Nontender. Nondistended  Bowel sounds are normal in all four quadrants. No organomegally appreciated.   Musculoskeletal:  No edema, no tenderness, and no deformity.  No red or swollen joints anywhere.    Neurological:  Alert, Cranial nerves II-XII intact with no focal deficits.  No facial droop.  No slurred speech.   Skin:  Warm and dry to  palpation with no rashes or lesions appreciated.  Peripheral vascular:  2+ radial and pedal pulses in bilateral upper and lower extremities.  Psychiatric:  Alert and oriented x3, demonstrates appropriate judgment and insight.  ---------------------------------------------------------------------------------------  ----------------------------------------------------------------------------------------------------------------------  Results from last 7 days   Lab Units 02/26/25  0956 02/26/25  0901   HSTROP T ng/L 33* 28*     Results from last 7 days   Lab Units 03/03/25  0114 03/02/25  0210 03/01/25  0101 02/27/25  0058 02/26/25  1843 02/26/25  1245 02/26/25  0941   LACTATE mmol/L  --   --   --   --   --  1.0 3.4*   WBC 10*3/mm3 13.11* 11.60* 11.60*   < >  --   --   --    HEMOGLOBIN g/dL 11.2* 11.5* 10.4*   < >  --   --   --    HEMATOCRIT % 36.2 36.5 33.4*   < >  --   --   --    MCV fL 94.0 92.2 93.0   < >  --   --   --    MCHC g/dL 30.9* 31.5 31.1*   < >  --   --   --    PLATELETS 10*3/mm3 328 322 298   < >  --   --   --    INR   --   --   --   --  1.19*  --   --     < > = values in this interval not displayed.     Results from last 7 days   Lab Units 02/26/25  0839   PH, ARTERIAL pH units 7.450   PO2 ART mm Hg 77.1*   PCO2, ARTERIAL mm Hg 40.2   HCO3 ART mmol/L 27.9*     Results from last 7 days   Lab Units 03/03/25  0114 03/02/25  0210 03/01/25  0101 02/28/25  0128 02/27/25  0058   SODIUM mmol/L 142 145 143 144 141   POTASSIUM mmol/L 3.9 4.0 3.8 3.3* 3.3*   MAGNESIUM mg/dL  --   --  2.6* 2.3 2.1   CHLORIDE mmol/L 105 109* 106 105 102   CO2 mmol/L 28.4 26.6 25.8 25.3 23.2   BUN mg/dL 50* 51* 56* 40* 13   CREATININE mg/dL 0.95 0.92 1.19* 1.26* 0.84   CALCIUM mg/dL 9.0 9.3 8.9 9.2 9.1   GLUCOSE mg/dL 248* 159* 183* 169* 182*   ALBUMIN g/dL 3.4*  --  3.1*  --  3.3*   BILIRUBIN mg/dL 0.4  --  0.2  --  0.5   ALK PHOS U/L 100  --  90  --  110   AST (SGOT) U/L 32  --  23  --  38*   ALT (SGPT) U/L 28  --  16  --  21  "  Estimated Creatinine Clearance: 46.6 mL/min (by C-G formula based on SCr of 0.95 mg/dL).    No results found for: \"AMMONIA\"      Blood Culture   Date Value Ref Range Status   02/26/2025 No growth at 5 days  Final   02/26/2025 No growth at 5 days  Final     No results found for: \"URINECX\"  No results found for: \"WOUNDCX\"  No results found for: \"STOOLCX\"    I have personally looked at the labs and they are summarized above.  ----------------------------------------------------------------------------------------------------------------------  Imaging Results (Last 24 Hours)       Procedure Component Value Units Date/Time    FL Video Swallow Single Contrast [[id number]] Collected: 03/03/25 1501     Updated: 03/03/25 1503    Narrative:      FL VIDEO SWALLOW SINGLE-CONTRAST-     CLINICAL INDICATION: Aspiration r/o; J84.10-Pulmonary fibrosis,  unspecified; J69.0-Pneumonitis due to inhalation of food and vomit        TECHNIQUE:   Speech therapy service was present. The patient was examined in the  sitting lateral position and was given several consistencies of barium.     FINDINGS: There was no penetration or aspiration during the study.  Patient protected her airway adequately.        FLUOROSCOPY TIME: 0.8 minutes     Images: Cine loop was acquired       Impression:      No evidence of aspiration.     For additional information please see the report provided by the speech  therapy service     This report was finalized on 3/3/2025 3:01 PM by Dr. Shawn Cohen MD.       XR Chest 1 View [[id number]] Collected: 03/03/25 0215     Updated: 03/03/25 0218    Narrative:      PROCEDURE: Chest x-ray examination performed on March 2, 2025. Single  view.     HISTORY: Feeding tube placement. Confirm position.     COMPARISON: None.     FINDINGS:     Enlarged heart size  Central pulmonary vascular congestion.  Edema.  Confluent and patchy airspace opacities in the lungs consistent with  multifocal pneumonia.  Mild elevated right " hemidiaphragm.  Feeding tube in place with tip in the gastric lumen.  No free air in the upper abdomen.       Impression:         Enlarged heart size  Central pulmonary vascular congestion.  Multifocal pneumonia.  Feeding tube in place with tip in the gastric lumen.        This report was finalized on 3/3/2025 2:16 AM by Skyler Castellanos MD.             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  Continue Solu-Medrol 500 mg IV daily x 3 days.  Appreciate pulmonology recommendations, will continue Zosyn and add doxycycline.    2.  Essential hypertension -currently well-controlled    3.  Acute kidney injury -resolved    4.  Possible dysphagia -patient did have reevaluation by SLP today, appreciate recommendations.  Will advance diet to puréed textures with thin liquids    5.  Hyperlipidemia -statin    6.  Hypothyroidism -Synthroid    Disposition patient remains critically ill with high risk of requiring intubation.  However, did have goals of care conversation with patient where she has requested to transition to DNR/DNI.  Does wish to continue high-dose steroids and monitor for hopeful improvement.    Moises Lorenzana,    03/03/25   20:04 EST

## 2025-03-04 NOTE — THERAPY RE-EVALUATION
"Acute Care - Speech Language Pathology   Swallow Re-Assessment [facility]  CLINICAL DYSPHAGIA ASSESSMENT     Patient Name: Gregoria Pendleton  : 1945  MRN: [id number]  Today's Date: 3/4/2025  Onset of Illness/Injury or Date of Surgery: 25     Referring Physician: Sarina      Admit Date: 2025  Gregoria Pendleton  was seen at bedside this am on [id number] to assess safety/efficacy of swallowing fnx, determine safest/least restrictive diet tolerance. She has a PMH significant for  pulmonary fibrosis, Hx CVA, GERD, dysphagia, thyroid disease, HTN, HLD, hx CVA approximately 2 years ago, and dysphagia w/ esophageal dilation required.      Per EMR review, \"She and spouse report she has been increasingly more short of breath for about the past week. She is having increased cough but denies much sputum production. She does wear 2L supplemental O2 at baseline. She has not had any fevers, chills. No known sick contacts. She does complain of rhinorrhea. No abdominal pain. She complains of constipation.   She has history of dysphagia- needs to have esophagus stretched periodically. For the past few weeks she has had more difficulty swallowing. Does not eat a modified diet at baseline, has occasional difficulty swallowing pills. Last night while eating dinner she \"strangled\" but after clearing her O2 sats normalized at home. She went on to bed and upon waking this morning they found her O2 saturation to be 33% on home pulse ox. Her  noted her to have some hemoptysis this morning as well and she did have one episode of vomiting. She denies recent epistaxis\".      Upon arrival to the ED, vital signs were temp 97.4, heart rate 107, respirations 35, SpO2 44% on nonrebreather, /78.  ABG obtained on 100% FiO2 nonrebreather showed pH normal, pCO2 normal, pO2 77.1.  Her initial HS troponin was 28 with repeat at 33, proBNP is 3228.  White blood cell count is elevated at 21.42 with D-dimer 1.73, lactate 3.4 and " "Pro-Brooks normal.  CT PE protocol negative for PE-did note diffuse bilateral airspace pneumonia-dense consolidation concerning for alveolar hemorrhage.      She was unfamiliar to SLP department of [facility] prior to this consult. No SLP notes were available for review from outside facilities at this time per EMR review. She is currently NPO w/ sips permitted w/ meds.      She is s/p clinical dysphagia assessment on 2/27 at which time she was evidenced w/ the following: \"...a generally weak overall oropharyngeal swallow w/ overt s/s concerning for aspiration demonstrated intermittently w/ thin water. Silent aspiration is unable to be definitively ruled out via this subjective assessment. Given findings from this assessment, leukocytosis, hx of dysphagia, hx CVA, recent choking event, and current chest imaging, she is felt to most benefit from instrumental MBS to r/o aspiration prior to po diet recommendations\".      She has continued NPO w/ ice chips/water provided. MBS was unable to be performed 2/27 or 2/28 per O2 requirements.     She is s/p MBS on 3/3 at which time she was evidenced w/ the following: \"...profoundly limited endurance and increased fatigue across this evaluation. She additionally was noted w/ increased work of breathing across po presentations and cough response elicited following all po presentations. Oropharyngeal swallow was negatively impacted by these, however no aspiration was evidenced across this evaluation. Per limited endurance and fatigue, solid textures were deferred. She additionally was evidenced w/ decrease esophageal motility\". She was recommended for a modified po diet of puree textures and thin liquids w/ medications whole in puree/thins, single pill presentations, fully upright positioning for po intake, fully a/a for po intake, and feeding assistance per limited endurance/fatigue.     She is seen this date for diet safety/tolerance.    Social History     Socioeconomic History    Marital " status:    Tobacco Use    Smoking status: Never     Passive exposure: Never    Smokeless tobacco: Never   Vaping Use    Vaping status: Never Used   Substance and Sexual Activity    Alcohol use: Never    Drug use: Never    Sexual activity: Defer      Labs:   Latest Reference Range & Units 03/01/25 01:01 03/02/25 02:10 03/03/25 01:14 03/04/25 02:15   WBC 3.40 - 10.80 10*3/mm3 11.60 (H) 11.60 (H) 13.11 (H) 17.40 (H)   RBC 3.77 - 5.28 10*6/mm3 3.59 (L) 3.96 3.85 3.89   Hemoglobin 12.0 - 15.9 g/dL 10.4 (L) 11.5 (L) 11.2 (L) 11.2 (L)   Hematocrit 34.0 - 46.6 % 33.4 (L) 36.5 36.2 35.6   Platelets 140 - 450 10*3/mm3 298 322 328 314   RDW 12.3 - 15.4 % 14.4 13.9 14.0 14.4   MCV 79.0 - 97.0 fL 93.0 92.2 94.0 91.5   MCH 26.6 - 33.0 pg 29.0 29.0 29.1 28.8   MCHC 31.5 - 35.7 g/dL 31.1 (L) 31.5 30.9 (L) 31.5   MPV 6.0 - 12.0 fL 10.2 10.5 10.4 10.3   RDW-SD 37.0 - 54.0 fl 49.7 47.1 47.8 48.1   (H): Data is abnormally high  (L): Data is abnormally low  Diet Orders (active) (From admission, onward)       Start     Ordered    03/04/25 0800  Dietary Nutrition Supplements Other (See Comment); Boost Glucose  Daily With Breakfast, Lunch & Dinner       03/03/25 2117    03/03/25 1456  Diet: Regular/House; Feeding Assistance - Nursing; Texture: Pureed (NDD 1); Fluid Consistency: Thin (IDDSI 0)  Diet Effective Now        Comments: Assist w/ feeding.   Defer po intake when not fully a/a   Upright for all po intake.    03/03/25 1457                    Ms Storms was observed on HHFNC at 60L and 100% FiO2. She reports fatigue.     She requests assistance w/ orange jello and sips thin water this date via straw. SLP provides all presentations.     Upon po presentations, adequate bolus anticipation and acceptance. Labial seal was adequate to achieve suction via straw.  Bolus formation, manipulation and control prolonged w/ jello presentations. No overt s/s aspiration before the swallow.      Pharyngeal swallow was timely w/ adequate  hyolaryngeal elevation per palpation. No overt s/s aspiration evidenced across this evaluation. No silent aspiration suspected. Patient denied odynophagia.    Visit Dx:     ICD-10-CM ICD-9-CM   1. Pulmonary fibrosis  J84.10 515   2. Aspiration pneumonia of both lungs, unspecified aspiration pneumonia type, unspecified part of lung  J69.0 507.0     Patient Active Problem List   Diagnosis    Pulmonary fibrosis    Essential hypertension    Cryptogenic stroke    Esophageal dysphagia    Gastroesophageal reflux disease    Acute hypoxic respiratory failure     Past Medical History:   Diagnosis Date    Arthritis     Disease of thyroid gland     Elevated cholesterol     GERD (gastroesophageal reflux disease)     Hyperlipidemia     Hypertension     Migraine     Pulmonary fibrosis     Stroke      Past Surgical History:   Procedure Laterality Date    CYSTOSCOPY W/ LASER LITHOTRIPSY  01/15/2024    ENDOSCOPY N/A 12/12/2024    Procedure: ESOPHAGOGASTRODUODENOSCOPY WITH BIOPSY;  Surgeon: Jaky Goddard MD;  Location: [facility];  Service: Gastroenterology;  Laterality: N/A;  dilated to 20mm    HYSTERECTOMY  age 38    TUBAL ABDOMINAL LIGATION Bilateral      Impression:     Ms Pendleton presented w/ continued profoundly limited endurance and fatigue negatively impacting oral swallow function. Per this re-assessment, she is felt to most benefit from continued modified po diet of puree textures and thin liquids. Recommend medications whole in puree/thins or crushed PRN. Continue single pill presentations, fully upright positioning, fully a/a for all po intake, and feeding assistance to be provided per limited endurance/fatigue.     SLP Recommendation and Plan     1. Puree textures, thin liquids.   2. Medications whole in puree/thins. Single pill presentations.   3. JACK precautions.   4. Oral care protocol.   5. Fully upright for all po intake.   6. Fully a/a for all po intake.   7. Feeding assistance for all po intake    SLP to  f/u for continued diet safety/tolerance/acceptance.     D/w patient results and recommendations w/ verbal agreement.    D/w RN results and recommendations w/ verbal agreement.    Thank you for allowing me to participate in the care of your patient-  Danni Fischer M.S., CCC-SLP        EDUCATION  The patient has been educated in the following areas:   Dysphagia (Swallowing Impairment) Oral Care/Hydration Modified Diet Instruction.        Time Calculation:       Therapy Charges for Today       Code Description Service Date Service Provider Modifiers Qty    [id number] HC ST MOTION FLUORO EVAL SWALLOW 8 3/3/2025 Dnani Fischer, MS CCC-SLP GN 1                 Danni Fischer MS CCC-SLP  3/4/2025

## 2025-03-04 NOTE — CONSULTS
Palliative Care Initial Consult     Attending Physician: Moises Lorenzana,*  Referring Provider: Moises Lorenzana    assistance with advance directives, assistance with clarification of goals of care, psychosocial support, and comfort measures    Code Status:   Code Status and Medical Interventions: No CPR (Do Not Attempt to Resuscitate); Comfort Measures; Patient and  Jarocho   Ordered at: 03/04/25 0118     Level Of Support Discussed With:    Patient    Health Care Surrogate     Code Status (Patient has no pulse and is not breathing):    No CPR (Do Not Attempt to Resuscitate)     Medical Interventions (Patient has pulse or is breathing):    Comfort Measures     Comments:    Patient and  Jarocho      Advanced Directives: Advance Directive Status: Patient does not have advance directive   Healthcare surrogate: NOK  Jarocho Pendleton    Goals of Care: I was able to have a conversation with Gregoria and Jarocho at bedside about GOC and discussed what comfort measures was/what this entailed. We discussed that we transition our focus from that of treatment, to focusing on Gregoria's comfort. I explained that we would no longer stick for labs, do scans, tests, other procedures and would not run fluids, antibiotics or electrolytes and would have symptom management medications ordered as needed for her comfort. I also discussed with Jarocho that we would titrate her HHF Oxygen cannula down and transition to a bubble that was more comfortable. Jarocho states she has been suffering and he does not want to see her suffer any further, Gregoria also agreed that she just wants to be comfortable. I changed Gregoria to comfort measures and discussed with Paula Denis RN was in room with me. I changed Gregoria to comfort measures and ordered prn medications for comfort.    HPI:  Gregoria Pendleton is a 79 y.o. female admitted on 2/26/2025 due to shortness of breath times one week with increased cough. Gregoria has a medical history of  dysphagia(periodic esophagus stretching)  pulmonary fibrosis, Hx CVA, GERD, thyroid disease, HTN, HLD.   CT PE protocol in ER negative for PE-did note diffuse bilateral airspace pneumonia-dense consolidation concerning for alveolar hemorrhage. Despite continued treatment with Antibiotics, Solu-medrol, breathing treatments, Gregoria oxygen requirements have continued to increase and is today 3/4/25 on HHF at 60L and 100% FiO2. Patients  Jarocho states Gregoria is suffering at the point and has asked for comfort. Palliative care consulted for GOC/comfort measures and support for Gregoria and her family.      ROS: Negative except as above in HPI.     Past Medical History:   Diagnosis Date    Arthritis     Disease of thyroid gland     Elevated cholesterol     GERD (gastroesophageal reflux disease)     Hyperlipidemia     Hypertension     Migraine     Pulmonary fibrosis     Stroke      Past Surgical History:   Procedure Laterality Date    CYSTOSCOPY W/ LASER LITHOTRIPSY  01/15/2024    ENDOSCOPY N/A 12/12/2024    Procedure: ESOPHAGOGASTRODUODENOSCOPY WITH BIOPSY;  Surgeon: Jaky Goddard MD;  Location: [facility];  Service: Gastroenterology;  Laterality: N/A;  dilated to 20mm    HYSTERECTOMY  age 38    TUBAL ABDOMINAL LIGATION Bilateral      Social History     Socioeconomic History    Marital status:    Tobacco Use    Smoking status: Never     Passive exposure: Never    Smokeless tobacco: Never   Vaping Use    Vaping status: Never Used   Substance and Sexual Activity    Alcohol use: Never    Drug use: Never    Sexual activity: Defer     Family History   Problem Relation Age of Onset    Heart failure Father     Heart disease Sister     Heart attack Sister     Breast cancer Neg Hx        Allergies   Allergen Reactions    Ampicillin Diarrhea    Clonidine Other (See Comments)     Patches cause chemical burns       Current Facility-Administered Medications   Medication Dose Route Frequency Provider Last Rate Last  Admin    acetaminophen (TYLENOL) tablet 650 mg  650 mg Oral Q6H PRN SarinaLito oneal, DO   650 mg at 02/28/25 1122    albuterol (PROVENTIL) nebulizer solution 0.083% 2.5 mg/3mL  2.5 mg Nebulization Q4H PRN SarinaLito oneal A, DO   2.5 mg at 03/04/25 0650    sennosides-docusate (PERICOLACE) 8.6-50 MG per tablet 2 tablet  2 tablet Oral BID PRN SarinaLito oneal A, DO        And    polyethylene glycol (MIRALAX) packet 17 g  17 g Oral Daily PRN SarinaLito oneal DO        And    bisacodyl (DULCOLAX) EC tablet 5 mg  5 mg Oral Daily PRN SarinaLito oneal, DO        And    bisacodyl (DULCOLAX) suppository 10 mg  10 mg Rectal Daily PRN SarinaLito oneal DO        calcium carbonate (TUMS) chewable tablet 500 mg (200 mg elemental)  2 tablet Oral BID PRN Lito Branch, DO        cetirizine (zyrTEC) tablet 10 mg  10 mg Oral Daily SarinaLito oneal, DO   10 mg at 03/04/25 0800    clonazePAM (KlonoPIN) tablet 0.5 mg  0.5 mg Oral BID PRN Moises Lorenzana, DO   0.5 mg at 03/04/25 0539    dextrose (D50W) (25 g/50 mL) IV injection 25 g  25 g Intravenous Q15 Min PRN Lito Branch DO        dextrose (GLUTOSE) oral gel 15 g  15 g Oral Q15 Min PRN Lito Branch, DO        DULoxetine (CYMBALTA) DR capsule 60 mg  60 mg Oral Daily SarinaLito oneal, DO   60 mg at 03/04/25 0759    estradiol (ESTRACE) tablet 1 mg  1 mg Oral Daily SarinaSunday onealer A, DO   1 mg at 03/04/25 0801    FLUoxetine (PROzac) capsule 10 mg  10 mg Oral Daily SarinaLito oneal, DO   10 mg at 03/04/25 0801    glucagon HCl (Diagnostic) injection 1 mg  1 mg Intramuscular Q15 Min PRN Lito Branch DO        glycopyrrolate (ROBINUL) injection 0.4 mg  0.4 mg Intravenous Q4H PRN Virgen Burger APRN        guaiFENesin-dextromethorphan (ROBITUSSIN DM) 100-10 MG/5ML syrup 10 mL  10 mL Oral Q4H PRN Bianca Baez DO        Insulin Lispro (humaLOG) injection 2-7 Units  2-7 Units  Subcutaneous 4x Daily AC & at Bedtime Lito Branch DO   3 Units at 03/04/25 1119    ipratropium (ATROVENT) nebulizer solution 0.5 mg  0.5 mg Nebulization Q6H PRN Lito Branch DO   0.5 mg at 03/01/25 1037    levothyroxine (SYNTHROID, LEVOTHROID) tablet 100 mcg  100 mcg Oral QAM AC Lito Branch, DO   100 mcg at 03/04/25 0758    LORazepam (ATIVAN) injection 0.5 mg  0.5 mg Intravenous Q15 Min PRN Virgen Burger APRN        melatonin tablet 10 mg  10 mg Oral Nightly Lito Branch DO   10 mg at 03/03/25 2121    morphine injection 2 mg  2 mg Intravenous Q15 Min PRN Virgen Burger APRN   2 mg at 03/04/25 1349    Or    morphine concentrated solution 5 mg  5 mg Oral Q15 Min PRN Virgen Burger APRN        pantoprazole (PROTONIX) EC tablet 40 mg  40 mg Oral Q AM Lito Branch DO   40 mg at 03/04/25 0515    phenol (CHLORASEPTIC) 1.4 % liquid 1 spray  1 spray Mouth/Throat Q2H PRN Mitchell Choe MD   1 spray at 02/28/25 2138    potassium chloride (KLOR-CON) packet 40 mEq  40 mEq Oral Once Maikel Trevino MD        rOPINIRole (REQUIP) tablet 0.5 mg  0.5 mg Oral Daily Lito Branch DO   0.5 mg at 03/04/25 0758    scopolamine patch 1 mg/72 hr  1 patch Transdermal Q72H Virgen Burger APRN   1 patch at 03/04/25 1350    sodium chloride 0.9 % flush 10 mL  10 mL Intravenous PRN Lazaro Damian MD        sodium chloride 0.9 % flush 10 mL  10 mL Intravenous Q12H Lito Branch DO   10 mL at 03/04/25 0803    sodium chloride 0.9 % flush 10 mL  10 mL Intravenous PRN Lito Branch DO        sodium chloride 0.9 % flush 10 mL  10 mL Intravenous Q12H SarinaLito oneal, DO   10 mL at 03/04/25 0803    sodium chloride 0.9 % flush 10 mL  10 mL Intravenous PRN Lito Branch, DO        sodium chloride 0.9 % flush 10 mL  10 mL Intravenous Q12H SarinaLito oneal, DO   10 mL at 03/04/25 0803    sodium chloride 0.9 % flush 10 mL  10  "mL Intravenous PRN Sarina, Christopher A, DO        sodium chloride 0.9 % infusion 40 mL  40 mL Intravenous PRN Sarina, Christopher A, DO        sodium chloride 0.9 % infusion 40 mL  40 mL Intravenous PRN Sarina, Christopher A, DO        sodium chloride 0.9 % infusion 40 mL  40 mL Intravenous PRN Sarina, Christopher A, DO              acetaminophen    albuterol    senna-docusate sodium **AND** polyethylene glycol **AND** bisacodyl **AND** bisacodyl    calcium carbonate    clonazePAM    dextrose    dextrose    glucagon (human recombinant)    glycopyrrolate    guaiFENesin-dextromethorphan    ipratropium    LORazepam    Morphine **OR** morphine    phenol    sodium chloride    sodium chloride    sodium chloride    sodium chloride    sodium chloride    sodium chloride    sodium chloride    Current medication reviewed for route, type, dose and frequency and are current per MAR.    Palliative Performance Scale Score:     /72 (BP Location: Right arm, Patient Position: Lying)   Pulse 60   Temp 97.9 °F (36.6 °C) (Axillary)   Resp (!) 33   Ht 157.5 cm (62\")   Wt 77.3 kg (170 lb 6.7 oz)   SpO2 90%   BMI 31.17 kg/m²     Intake/Output Summary (Last 24 hours) at 3/4/2025 1358  Last data filed at 3/4/2025 1200  Gross per 24 hour   Intake 1080 ml   Output 1000 ml   Net 80 ml       PE:  General Appearance:    Chronically ill appearing, alert, cooperative, moderate distress.   HEENT:    NC/AT, without obvious abnormality, EOMI, anicteric    Neck:   supple, trachea midline, no JVD   Lungs:     Labored respirations, coarse upper lobes, diminished throughout, tachypneic    Heart:    RRR, normal S1 and S2, no M/R/G   Abdomen:     Soft, NT, ND, NABS    Extremities:   Moves all extremities weakly , no edema   Pulses:   Pulses palpable and equal bilaterally   Skin:   Warm, dry, pale   Neurologic:   Weak, awake and alert to person and place.   Psych:   Restless/flat       Labs:   Results from last 7 days   Lab Units " 03/04/25 0215   WBC 10*3/mm3 17.40*   HEMOGLOBIN g/dL 11.2*   HEMATOCRIT % 35.6   PLATELETS 10*3/mm3 314     Results from last 7 days   Lab Units 03/04/25 0216   SODIUM mmol/L 150*   POTASSIUM mmol/L 3.9   CHLORIDE mmol/L 110*   CO2 mmol/L 30.9*   BUN mg/dL 45*   CREATININE mg/dL 0.83   GLUCOSE mg/dL 179*   CALCIUM mg/dL 9.1     Results from last 7 days   Lab Units 03/04/25 0216 03/03/25  0114   SODIUM mmol/L 150* 142   POTASSIUM mmol/L 3.9 3.9   CHLORIDE mmol/L 110* 105   CO2 mmol/L 30.9* 28.4   BUN mg/dL 45* 50*   CREATININE mg/dL 0.83 0.95   CALCIUM mg/dL 9.1 9.0   BILIRUBIN mg/dL  --  0.4   ALK PHOS U/L  --  100   ALT (SGPT) U/L  --  28   AST (SGOT) U/L  --  32   GLUCOSE mg/dL 179* 248*     Imaging Results (Last 72 Hours)       Procedure Component Value Units Date/Time    FL Video Swallow Single Contrast [[id number]] Collected: 03/03/25 1501     Updated: 03/03/25 1503    Narrative:      FL VIDEO SWALLOW SINGLE-CONTRAST-     CLINICAL INDICATION: Aspiration r/o; J84.10-Pulmonary fibrosis,  unspecified; J69.0-Pneumonitis due to inhalation of food and vomit        TECHNIQUE:   Speech therapy service was present. The patient was examined in the  sitting lateral position and was given several consistencies of barium.     FINDINGS: There was no penetration or aspiration during the study.  Patient protected her airway adequately.        FLUOROSCOPY TIME: 0.8 minutes     Images: Cine loop was acquired       Impression:      No evidence of aspiration.     For additional information please see the report provided by the speech  therapy service     This report was finalized on 3/3/2025 3:01 PM by Dr. Shawn Cohen MD.       XR Chest 1 View [[id number]] Collected: 03/03/25 0215     Updated: 03/03/25 0218    Narrative:      PROCEDURE: Chest x-ray examination performed on March 2, 2025. Single  view.     HISTORY: Feeding tube placement. Confirm position.     COMPARISON: None.     FINDINGS:     Enlarged heart size  Central  pulmonary vascular congestion.  Edema.  Confluent and patchy airspace opacities in the lungs consistent with  multifocal pneumonia.  Mild elevated right hemidiaphragm.  Feeding tube in place with tip in the gastric lumen.  No free air in the upper abdomen.       Impression:         Enlarged heart size  Central pulmonary vascular congestion.  Multifocal pneumonia.  Feeding tube in place with tip in the gastric lumen.        This report was finalized on 3/3/2025 2:16 AM by Skyler Castellanos MD.       XR Chest 1 View [[id number]] Collected: 03/02/25 0617     Updated: 03/02/25 0620    Narrative:      PROCEDURE: Portable chest x-ray examination performed on March 1, 2025.  Single view.     HISTORY: NG tube placement.     COMPARISON: None.     FINDINGS:     Enlarged heart size.  Feeding tube in place with tip in the gastric lumen.  Coarsened bronchovascular pattern to the lungs with features of  multifocal pneumonia.  Mild hypoinflated lungs  No pleural effusion or pneumothorax.       Impression:         Enlarged heart size  Multifocal pneumonia.  Hypoinflated lungs  Feeding tube in place with tip in the gastric lumen.        This report was finalized on 3/2/2025 6:18 AM by Skyler Castellanos MD.               Diagnostics: Reviewed    A: Gregoria Pendleton is a 79 y.o. female admitted on 2/26/2025 due to shortness of breath times one week with increased cough. Gregoria has a medical history of dysphagia(periodic esophagus stretching)  pulmonary fibrosis, Hx CVA, GERD, thyroid disease, HTN, HLD.   CT PE protocol in ER negative for PE-did note diffuse bilateral airspace pneumonia-dense consolidation concerning for alveolar hemorrhage. Despite continued treatment with Antibiotics, Solu-medrol, breathing treatments, Gregoria oxygen requirements have continued to increase and is today 3/4/25 on HHF at 60L and 100% FiO2. Patients  Jarocho states Gregoria is suffering at the point and has asked for comfort. Palliative care consulted for  GOC/comfort measures and support for Gregoria and her family.         P:   I was able to have a conversation with Gregoria and Jarocho at bedside about GOC and discussed what comfort measures was/what this entailed. We discussed that we transition our focus from that of treatment, to focusing on Gregoria's comfort. I explained that we would no longer stick for labs, do scans, tests, other procedures and would not run fluids, antibiotics or electrolytes and would have symptom management medications ordered as needed for her comfort. I also discussed with Jarocho that we would titrate her HHF Oxygen cannula down and transition to a bubble that was more comfortable. Jarocho states she has been suffering and he does not want to see her suffer any further, Gregoria also agreed that she just wants to be comfortable. I changed Gregoria to comfort measures and discussed with Paula Denis RN was in room with me. I changed Gregoria to comfort measures and ordered prn medications for comfort.    We appreciate the consult and the opportunity to participate in Gregoria Pendleton's care. We will continue to follow along. Please do not hesitate to contact us regarding further symptom management or goals of care needs, including after hours or on weekends via our on call provider at [phone].     Time: 55 minutes spent reviewing medical and medication records, assessing and examining patient, discussing with family, answering questions, providing some guidance about a plan and documentation of care, and coordinating care with other healthcare members, with > 50% time spent face to face.     Virgen Burger, APRN    3/4/2025

## 2025-03-04 NOTE — PLAN OF CARE
Problem: Skin Injury Risk Increased  Goal: Skin Health and Integrity  Outcome: Progressing  Intervention: Optimize Skin Protection  Recent Flowsheet Documentation  Taken 3/3/2025 2030 by Rose Cohen RN  Head of Bed (HOB) Positioning: HOB at 30-45 degrees     Problem: Fall Injury Risk  Goal: Absence of Fall and Fall-Related Injury  Outcome: Progressing  Intervention: Identify and Manage Contributors  Recent Flowsheet Documentation  Taken 3/4/2025 0300 by Rose Cohen RN  Medication Review/Management: medications reviewed  Taken 3/4/2025 0100 by Rose Cohen RN  Medication Review/Management: medications reviewed  Taken 3/3/2025 2300 by Rose Cohen RN  Medication Review/Management: medications reviewed  Taken 3/3/2025 1900 by Rose Cohen RN  Medication Review/Management: medications reviewed  Intervention: Promote Injury-Free Environment  Recent Flowsheet Documentation  Taken 3/4/2025 0300 by Rose Cohen RN  Safety Promotion/Fall Prevention: safety round/check completed  Taken 3/4/2025 0100 by Rose Cohen RN  Safety Promotion/Fall Prevention: safety round/check completed  Taken 3/3/2025 2300 by Rose Cohen RN  Safety Promotion/Fall Prevention: safety round/check completed  Taken 3/3/2025 1900 by Rose Cohen RN  Safety Promotion/Fall Prevention: safety round/check completed     Problem: Sepsis/Septic Shock  Goal: Optimal Coping  Outcome: Progressing  Intervention: Support Patient and Family Response  Recent Flowsheet Documentation  Taken 3/4/2025 0128 by Rose Cohen RN  Family/Support System Care: support provided  Taken 3/3/2025 2000 by Rose Cohen RN  Family/Support System Care: support provided  Goal: Absence of Bleeding  Outcome: Progressing  Goal: Blood Glucose Level Within Target Range  Outcome: Progressing  Goal: Absence of Infection Signs and Symptoms  Outcome: Progressing  Intervention: Initiate Sepsis Management  Recent Flowsheet Documentation  Taken 3/4/2025 0300 by Rose Cohen  RN  Infection Prevention: single patient room provided  Taken 3/4/2025 0100 by Rose Cohen RN  Infection Prevention: single patient room provided  Taken 3/3/2025 2300 by Rose Cohen RN  Infection Prevention: single patient room provided  Taken 3/3/2025 1900 by Rose Cohen RN  Infection Prevention: single patient room provided  Goal: Optimal Nutrition Delivery  Outcome: Progressing     Problem: Adult Inpatient Plan of Care  Goal: Plan of Care Review  Outcome: Progressing  Flowsheets (Taken 3/4/2025 0304)  Progress: declining  Plan of Care Reviewed With: patient  Goal: Patient-Specific Goal (Individualized)  Outcome: Progressing  Goal: Absence of Hospital-Acquired Illness or Injury  Outcome: Progressing  Intervention: Identify and Manage Fall Risk  Recent Flowsheet Documentation  Taken 3/4/2025 0300 by Rose Cohen RN  Safety Promotion/Fall Prevention: safety round/check completed  Taken 3/4/2025 0100 by Rose Cohen RN  Safety Promotion/Fall Prevention: safety round/check completed  Taken 3/3/2025 2300 by Rose Cohen RN  Safety Promotion/Fall Prevention: safety round/check completed  Taken 3/3/2025 1900 by Rose Cohen RN  Safety Promotion/Fall Prevention: safety round/check completed  Intervention: Prevent Skin Injury  Recent Flowsheet Documentation  Taken 3/3/2025 2030 by Rose Cohen RN  Body Position:   side-lying   left  Intervention: Prevent and Manage VTE (Venous Thromboembolism) Risk  Recent Flowsheet Documentation  Taken 3/3/2025 2000 by Rose Cohen RN  VTE Prevention/Management:   compression stockings on   SCDs (sequential compression devices) on  Intervention: Prevent Infection  Recent Flowsheet Documentation  Taken 3/4/2025 0300 by Rose Cohen RN  Infection Prevention: single patient room provided  Taken 3/4/2025 0100 by Rose Cohen RN  Infection Prevention: single patient room provided  Taken 3/3/2025 2300 by Rose Cohen RN  Infection Prevention: single patient room provided  Taken  3/3/2025 1900 by Rose Cohen RN  Infection Prevention: single patient room provided  Goal: Optimal Comfort and Wellbeing  Outcome: Progressing  Intervention: Provide Person-Centered Care  Recent Flowsheet Documentation  Taken 3/4/2025 0128 by Rose Cohen RN  Trust Relationship/Rapport:   care explained   choices provided   thoughts/feelings acknowledged  Taken 3/3/2025 2000 by Rose Cohen RN  Trust Relationship/Rapport:   care explained   choices provided   thoughts/feelings acknowledged  Goal: Readiness for Transition of Care  Outcome: Progressing  Goal: Plan of Care Review  Outcome: Progressing  Flowsheets (Taken 3/4/2025 0304)  Progress: declining  Plan of Care Reviewed With: patient  Goal: Patient-Specific Goal (Individualized)  Outcome: Progressing  Goal: Absence of Hospital-Acquired Illness or Injury  Outcome: Progressing  Intervention: Identify and Manage Fall Risk  Recent Flowsheet Documentation  Taken 3/4/2025 0300 by Rose Cohen RN  Safety Promotion/Fall Prevention: safety round/check completed  Taken 3/4/2025 0100 by Rose Cohen RN  Safety Promotion/Fall Prevention: safety round/check completed  Taken 3/3/2025 2300 by Rose Cohen RN  Safety Promotion/Fall Prevention: safety round/check completed  Taken 3/3/2025 1900 by Rose Cohen RN  Safety Promotion/Fall Prevention: safety round/check completed  Intervention: Prevent Skin Injury  Recent Flowsheet Documentation  Taken 3/3/2025 2030 by Rose Cohen RN  Body Position:   side-lying   left  Intervention: Prevent and Manage VTE (Venous Thromboembolism) Risk  Recent Flowsheet Documentation  Taken 3/3/2025 2000 by Rose Cohen RN  VTE Prevention/Management:   compression stockings on   SCDs (sequential compression devices) on  Intervention: Prevent Infection  Recent Flowsheet Documentation  Taken 3/4/2025 0300 by Rose Cohen RN  Infection Prevention: single patient room provided  Taken 3/4/2025 0100 by Rose Cohen RN  Infection Prevention:  single patient room provided  Taken 3/3/2025 2300 by Rose Cohen, RN  Infection Prevention: single patient room provided  Taken 3/3/2025 1900 by Rose Cohen, RN  Infection Prevention: single patient room provided  Goal: Optimal Comfort and Wellbeing  Outcome: Progressing  Intervention: Provide Person-Centered Care  Recent Flowsheet Documentation  Taken 3/4/2025 0128 by Rose Cohen, RN  Trust Relationship/Rapport:   care explained   choices provided   thoughts/feelings acknowledged  Taken 3/3/2025 2000 by Rose Cohen, RN  Trust Relationship/Rapport:   care explained   choices provided   thoughts/feelings acknowledged  Goal: Readiness for Transition of Care  Outcome: Progressing   Goal Outcome Evaluation:  Plan of Care Reviewed With: patient        Progress: declining

## 2025-03-04 NOTE — PROGRESS NOTES
Progress Note Pulmonary      Subjective complaining of shortness of breath.  Required high flow oxygen at 60 L/min and 100% .     at bedside        Interval History:     As described    Review of Systems:    Denies chest pain, orthopnea, PND, leg edema, nausea, vomiting, diarrhea, hemoptysis, hematemesis, melena, bright red blood per rectum    Vital Signs  Temp:  [97.7 °F (36.5 °C)-98.9 °F (37.2 °C)] 98 °F (36.7 °C)  Heart Rate:  [] 57  Resp:  [12-45] 26  BP: (134-199)/(55-87) 160/68  Body mass index is 31.17 kg/m².    Intake/Output Summary (Last 24 hours) at 3/4/2025 1154  Last data filed at 3/4/2025 0800  Gross per 24 hour   Intake 840 ml   Output 1000 ml   Net -160 ml     I/O this shift:  In: 240 [P.O.:240]  Out: -     Physical Exam:  General- normal in appearance, not in any acute distress    HEENT- pupils equally reactive to light, normal in size, no scleral icterus    Neck- supple    No JVD, no carotid bruit    Respiratory-bilateral air entry, basal rales, no wheezing.    Cardiovascular-  Normal S1 and S2. No S3, S4 or murmurs.    GI-nontender nondistended bowel sounds positive    CNS-alert oriented x3, grossly nonfocal    Extremities- pulses normal bilaterally , no clubbing and edema        Results Review:      Results from last 7 days   Lab Units 03/04/25  0215 03/03/25  0114 03/02/25  0210   WBC 10*3/mm3 17.40* 13.11* 11.60*   HEMOGLOBIN g/dL 11.2* 11.2* 11.5*   PLATELETS 10*3/mm3 314 328 322     Results from last 7 days   Lab Units 03/04/25  0216 03/03/25  0114 03/02/25  0210 03/01/25  0101 02/28/25  0128 02/27/25  0058   SODIUM mmol/L 150* 142 145 143 144 141   POTASSIUM mmol/L 3.9 3.9 4.0 3.8 3.3* 3.3*   CHLORIDE mmol/L 110* 105 109* 106 105 102   CO2 mmol/L 30.9* 28.4 26.6 25.8 25.3 23.2   BUN mg/dL 45* 50* 51* 56* 40* 13   CREATININE mg/dL 0.83 0.95 0.92 1.19* 1.26* 0.84   CALCIUM mg/dL 9.1 9.0 9.3 8.9 9.2 9.1   GLUCOSE mg/dL 179* 248* 159* 183* 169* 182*   MAGNESIUM mg/dL  --   --   --   2.6* 2.3 2.1     Lab Results   Component Value Date    INR 1.19 (H) 02/26/2025    INR 0.95 12/26/2024    PROTIME 15.2 (H) 02/26/2025    PROTIME 10.3 12/26/2024     Results from last 7 days   Lab Units 03/03/25  0114 03/01/25  0101 02/27/25  0058   ALK PHOS U/L 100 90 110   BILIRUBIN mg/dL 0.4 0.2 0.5   ALT (SGPT) U/L 28 16 21   AST (SGOT) U/L 32 23 38*     Results from last 7 days   Lab Units 02/26/25  0839   PH, ARTERIAL pH units 7.450   PO2 ART mm Hg 77.1*   PCO2, ARTERIAL mm Hg 40.2   HCO3 ART mmol/L 27.9*     Imaging Results (Last 24 Hours)       Procedure Component Value Units Date/Time    FL Video Swallow Single Contrast [[id number]] Collected: 03/03/25 1501     Updated: 03/03/25 1503    Narrative:      FL VIDEO SWALLOW SINGLE-CONTRAST-     CLINICAL INDICATION: Aspiration r/o; J84.10-Pulmonary fibrosis,  unspecified; J69.0-Pneumonitis due to inhalation of food and vomit        TECHNIQUE:   Speech therapy service was present. The patient was examined in the  sitting lateral position and was given several consistencies of barium.     FINDINGS: There was no penetration or aspiration during the study.  Patient protected her airway adequately.        FLUOROSCOPY TIME: 0.8 minutes     Images: Cine loop was acquired       Impression:      No evidence of aspiration.     For additional information please see the report provided by the speech  therapy service     This report was finalized on 3/3/2025 3:01 PM by Dr. Shawn Cohen MD.                    amLODIPine, 5 mg, Oral, Q24H   And  lisinopril, 40 mg, Oral, Q24H  aspirin, 81 mg, Oral, Daily  atenolol, 25 mg, Oral, Daily  atorvastatin, 10 mg, Oral, Daily  cetirizine, 10 mg, Oral, Daily  cholecalciferol, 50,000 Units, Oral, Weekly  cloNIDine, 0.2 mg, Oral, Q8H  donepezil, 10 mg, Oral, Nightly  doxycycline, 100 mg, Intravenous, Q12H  DULoxetine, 60 mg, Oral, Daily  estradiol, 1 mg, Oral, Daily  FLUoxetine, 10 mg, Oral, Daily  insulin lispro, 2-7 Units, Subcutaneous, 4x  Daily AC & at Bedtime  isosorbide mononitrate, 30 mg, Oral, Daily  levothyroxine, 100 mcg, Oral, QAM AC  lubiprostone, 8 mcg, Oral, BID  melatonin, 10 mg, Oral, Nightly  nystatin, 1 Application, Topical, BID  pantoprazole, 40 mg, Oral, Q AM  piperacillin-tazobactam, 3.375 g, Intravenous, Q8H  potassium chloride, 40 mEq, Oral, Once  rOPINIRole, 0.5 mg, Oral, Daily  sodium chloride, 10 mL, Intravenous, Q12H  sodium chloride, 10 mL, Intravenous, Q12H  sodium chloride, 10 mL, Intravenous, Q12H           Medication Review:     Assessment & Plan     #1: Acute on chronic hypoxic respiratory failure-likely due to flareup of her interstitial lung disease.   diffuse alveolar hemorrhage.      Patient does have history of going to camping and got sick.  I am going to send Lyme titer as well as tick related screening.   Continue Zosyn. / Doxycycline.    She brought a pulse dose of steroid.  I am going to keep her on Solu-Medrol at 60 mg every 12 hours.  Continue supplemental oxygen.  BiPAP as needed.    Respiratory rate 20    Cardiology- hemodynamically -stable.  Continue to monitor HR- rate and rhythm, BP      Nephrology-OMID-creatinine better.  Continue to monitor.  Hold lisinopril and discontinued spironolactone  I/O-reviewed        Hematology- CBC  Hb  platelet  WBC        Endrocrinology- Maintain Blood sugar 140 -180        Electrolytes-   Mag and phos         DVT prophylaxis-  Continue     I have personally reviewed x-rays, labs, medication list.    Total time spent 35  Prognosis karie zaragoza.    Tacho Blackwell MD  03/04/25  11:54 EST

## 2025-03-04 NOTE — CASE MANAGEMENT/SOCIAL WORK
Discharge Planning Assessment  RACHEL Calderon     Patient Name: Gregoria Pendleton  MRN: [id number]  Today's Date: 3/4/2025    Admit Date: 2/26/2025     Discharge Plan       Row Name 03/04/25 1422       Plan    Plan Pt was made comfort measures today. SS to follow.                  Expected Discharge Date and Time       Expected Discharge Date Expected Discharge Time    Mar 5, 2025         KEVIN Metzger

## 2025-03-05 LAB — B BURGDOR IGG+IGM SER QL IA: NEGATIVE

## 2025-03-05 NOTE — NURSING NOTE
[facility] home was called at . Staff member stated they would arrive shortly to pick the patient up.

## 2025-03-05 NOTE — DISCHARGE SUMMARY
[facility] HOSPITALIST MEDICINE DEATH SUMMARY    Patient Identification:  Name:  Gregoria Pendleton  Age:  79 y.o.  Sex:  female  :  1945  MRN:  [id number]  Visit Number:  [id number]    Date of Admission: 2025  Date of Death:  3/4/2025 at 1710    PCP: Marisa Arias, APRN    DISCHARGE DIAGNOSIS  Acute on chronic hypoxic respiratory failure  Essential hypertension  Pulmonary fibrosis  Acute kidney injury  Hyperlipidemia  Hypothyroidism    CONSULTS   Dr. Blackwell, Pulmonology    PROCEDURES PERFORMED  None    HOSPITAL COURSE  Ms. Pendleton is a 79 y.o. female who presented to [facility] ED on 2025 with a chief complaint of ornis of breath.  Patient has a past medical history remarkable for pulmonary fibrosis, GERD, dysphagia and thyroid disease as well as essential hypertension and hyperlipidemia.  Patient reported increasing shortness of breath for approximately 1 week prior to evaluation in the emergency department.  Patient does wear 2 L nasal cannula at baseline.  She denied any fevers, chills or recent sick contacts.  Initial evaluation in the emergency department did consist of basic laboratory work as well as physical exam and vital signs.  Please see initial H&P for specific details.  After thorough evaluation in the emergency department patient was diagnosed with acute hypoxic respiratory failure felt to be secondary to exacerbation of pulmonary fibrosis.  Patient also had findings concerning for diffuse alveolar hemorrhage.  Patient was admitted for further treatment and evaluation.    Patient was started on empiric antibiotic therapy with Rocephin and azithromycin.  Pulmonology services were consulted who thoroughly evaluated the patient.  After thorough evaluation, recommendation was made to start patient on Solu-Medrol 1 g IV daily x 3 days then slowly taper thereafter.  Unfortunately, patient had no significant improvement in respiratory status.  Patient was  transition to Solu-Medrol 500 mg IV daily for another 3 days.  Patient still demonstrated no evidence of improvement.  Unfortunately, patient demonstrated worsening respiratory drive and did appear to develop respiratory distress on 3/4/2025.  Multiple conversations were held with patient and her  at bedside prior to this event regarding high likelihood of respiratory muscle fatigue and eventual need for intubation versus transitioning to comfort measures.  Patient did request to continue treatment of underlying medical condition until she became sick enough to require intubation.  She stated at that time, she did not wish to be intubated but would instead wish to be transition to comfort measures and allowed to die peacefully without suffering.  As such, patient was transitioned to comfort measures today and patient did quickly deteriorate thereafter.  Patient continued to decline until she did  at 1710 on 3/4/2025    DISCHARGE DISPOSITION       TEST  RESULTS PENDING AT DISCHARGE  Pending Labs       Order Current Status    Ehrlichia Profile DNA PCR In process    Lyme Disease Total Antibody With Reflex to Immunoassay In process    Rickettsia Species DNA, Real-Time PCR In process               Moises Lorenzana DO  25  20:09 EST

## 2025-03-05 NOTE — PAYOR COMM NOTE
"PATIENT  ON 3/4/25    REF # [id number]     Gregoria Bojorquez (Dcsd. Female)       Date of Birth   1945    Social Security Number       Address   [address] [id number]    Home Phone   [phone]    MRN   [id number]       Restoration   Gnosticism    Marital Status                               Admission Date   25    Admission Type   Emergency    Admitting Provider   Lito Branch DO    Attending Provider       Department, Room/Bed   [facility] PROGRESS CARE, P213/S2       Discharge Date   3/4/2025    Discharge Disposition       Discharge Destination                                 Attending Provider: (none)   Allergies: Ampicillin, Clonidine    Isolation: None   Infection: None   Code Status: Prior    Ht: 157.5 cm (62\")   Wt: 77.3 kg (170 lb 6.7 oz)    Admission Cmt: None   Principal Problem: Acute hypoxic respiratory failure [J96.01]                   Active Insurance as of 2025       Primary Coverage       Payor Plan Insurance Group Employer/Plan Group    [facility] [place] [employer] MEDICARE ADVANTAGE SNP PPO        Payor Plan Address Payor Plan Phone Number Payor Plan Fax Number Effective Dates    [address] [address]   2025 - None Entered    [facility] [id number]         Subscriber Name Subscriber Birth Date Member ID       GREGORIA BOJORQUEZ 1945 [id number]               Secondary Coverage       Payor Plan Insurance Group Employer/Plan Group    [id number]        Payor Plan Address Payor Plan Phone Number Payor Plan Fax Number Effective Dates    [address]   3/22/2021 - None Entered    [facility] [id number]         Subscriber Name Subscriber Birth Date Member ID       GREGORIA BOJORQUEZ 1945 [id number]                     Emergency Contacts        (Rel.) Home Phone Work Phone Mobile Phone    Jarocho Bojorquez (Spouse) [phone] -- [phone]                 Discharge Summary    "     Moises Lorenzana DO at 25               [facility] HOSPITALIST MEDICINE DEATH SUMMARY    Patient Identification:  Name:  Gregoria Pendleton  Age:  79 y.o.  Sex:  female  :  1945  MRN:  [id number]  Visit Number:  [id number]    Date of Admission: 2025  Date of Death:  3/4/2025 at 1710    PCP: Marisa Arias, APRN    DISCHARGE DIAGNOSIS  Acute on chronic hypoxic respiratory failure  Essential hypertension  Pulmonary fibrosis  Acute kidney injury  Hyperlipidemia  Hypothyroidism    CONSULTS   Dr. Blackwell, Pulmonology    PROCEDURES PERFORMED  None    HOSPITAL COURSE  Ms. Pendleton is a 79 y.o. female who presented to [facility] ED on 2025 with a chief complaint of ornis of breath.  Patient has a past medical history remarkable for pulmonary fibrosis, GERD, dysphagia and thyroid disease as well as essential hypertension and hyperlipidemia.  Patient reported increasing shortness of breath for approximately 1 week prior to evaluation in the emergency department.  Patient does wear 2 L nasal cannula at baseline.  She denied any fevers, chills or recent sick contacts.  Initial evaluation in the emergency department did consist of basic laboratory work as well as physical exam and vital signs.  Please see initial H&P for specific details.  After thorough evaluation in the emergency department patient was diagnosed with acute hypoxic respiratory failure felt to be secondary to exacerbation of pulmonary fibrosis.  Patient also had findings concerning for diffuse alveolar hemorrhage.  Patient was admitted for further treatment and evaluation.    Patient was started on empiric antibiotic therapy with Rocephin and azithromycin.  Pulmonology services were consulted who thoroughly evaluated the patient.  After thorough evaluation, recommendation was made to start patient on Solu-Medrol 1 g IV daily x 3 days then slowly taper thereafter.  Unfortunately, patient had no  significant improvement in respiratory status.  Patient was transition to Solu-Medrol 500 mg IV daily for another 3 days.  Patient still demonstrated no evidence of improvement.  Unfortunately, patient demonstrated worsening respiratory drive and did appear to develop respiratory distress on 3/4/2025.  Multiple conversations were held with patient and her  at bedside prior to this event regarding high likelihood of respiratory muscle fatigue and eventual need for intubation versus transitioning to comfort measures.  Patient did request to continue treatment of underlying medical condition until she became sick enough to require intubation.  She stated at that time, she did not wish to be intubated but would instead wish to be transition to comfort measures and allowed to die peacefully without suffering.  As such, patient was transitioned to comfort measures today and patient did quickly deteriorate thereafter.  Patient continued to decline until she did  at 1710 on 3/4/2025    DISCHARGE DISPOSITION       TEST  RESULTS PENDING AT DISCHARGE  Pending Labs       Order Current Status    Ehrlichia Profile DNA PCR In process    Lyme Disease Total Antibody With Reflex to Immunoassay In process    Rickettsia Species DNA, Real-Time PCR In process               Moises Lorenzana DO  25  20:09 EST    Electronically signed by Moises Lorenzana DO at 25

## 2025-03-11 LAB
A PHAGOCYTOPH DNA BLD QL NAA+PROBE: NEGATIVE
EHRLICHIA DNA SPEC QL NAA+PROBE: NEGATIVE
RICKETTSIA RICKETTSII DNA, RT: NOT DETECTED

## (undated) DEVICE — Device

## (undated) DEVICE — Device: Brand: DEFENDO AIR/WATER/SUCTION AND BIOPSY VALVE

## (undated) DEVICE — FRCP BX RADJAW4 NDL 2.8 240CM LG OG BX40

## (undated) DEVICE — ESOPHAGEAL BALLOON DILATATION CATHETER: Brand: CRE FIXED WIRE

## (undated) DEVICE — THE BITE BLOCK MAXI, LATEX FREE STRAP IS USED TO PROTECT THE ENDOSCOPE INSERTION TUBE FROM BEING BITTEN BY THE PATIENT.

## (undated) DEVICE — DEV INFL ALLIANCE2 SYS